# Patient Record
Sex: FEMALE | Race: BLACK OR AFRICAN AMERICAN | NOT HISPANIC OR LATINO
[De-identification: names, ages, dates, MRNs, and addresses within clinical notes are randomized per-mention and may not be internally consistent; named-entity substitution may affect disease eponyms.]

---

## 2017-04-24 ENCOUNTER — APPOINTMENT (OUTPATIENT)
Dept: OBGYN | Facility: CLINIC | Age: 27
End: 2017-04-24

## 2017-04-24 VITALS
DIASTOLIC BLOOD PRESSURE: 80 MMHG | HEIGHT: 67 IN | BODY MASS INDEX: 42.53 KG/M2 | SYSTOLIC BLOOD PRESSURE: 126 MMHG | HEART RATE: 59 BPM | WEIGHT: 271 LBS

## 2017-04-24 LAB
HCG UR QL: NEGATIVE
QUALITY CONTROL: YES

## 2017-04-26 ENCOUNTER — OTHER (OUTPATIENT)
Age: 27
End: 2017-04-26

## 2017-04-26 LAB
C TRACH RRNA SPEC QL NAA+PROBE: NORMAL
HCG SERPL-MCNC: <1 MIU/ML
N GONORRHOEA RRNA SPEC QL NAA+PROBE: NORMAL
SOURCE AMPLIFICATION: NORMAL
T4 FREE SERPL-MCNC: 1 NG/DL
TSH SERPL-ACNC: 2.03 UIU/ML

## 2017-06-12 ENCOUNTER — APPOINTMENT (OUTPATIENT)
Dept: OBGYN | Facility: CLINIC | Age: 27
End: 2017-06-12

## 2017-06-12 VITALS
HEIGHT: 67 IN | WEIGHT: 267.5 LBS | DIASTOLIC BLOOD PRESSURE: 75 MMHG | BODY MASS INDEX: 41.99 KG/M2 | HEART RATE: 73 BPM | SYSTOLIC BLOOD PRESSURE: 115 MMHG

## 2017-06-12 RX ORDER — MEDROXYPROGESTERONE ACETATE 10 MG/1
10 TABLET ORAL DAILY
Qty: 10 | Refills: 0 | Status: DISCONTINUED | COMMUNITY
Start: 2017-04-26 | End: 2017-06-12

## 2017-07-03 LAB — CYTOLOGY CVX/VAG DOC THIN PREP: NORMAL

## 2017-10-06 ENCOUNTER — APPOINTMENT (OUTPATIENT)
Dept: OBGYN | Facility: CLINIC | Age: 27
End: 2017-10-06
Payer: COMMERCIAL

## 2017-10-06 VITALS
BODY MASS INDEX: 40.97 KG/M2 | HEART RATE: 86 BPM | WEIGHT: 261 LBS | DIASTOLIC BLOOD PRESSURE: 84 MMHG | SYSTOLIC BLOOD PRESSURE: 125 MMHG | HEIGHT: 67 IN

## 2017-10-06 DIAGNOSIS — L29.2 PRURITUS VULVAE: ICD-10-CM

## 2017-10-06 PROCEDURE — 99213 OFFICE O/P EST LOW 20 MIN: CPT

## 2017-10-06 RX ORDER — FLUCONAZOLE 150 MG/1
150 TABLET ORAL
Qty: 2 | Refills: 0 | Status: DISCONTINUED | COMMUNITY
Start: 2017-08-03

## 2017-10-10 ENCOUNTER — OTHER (OUTPATIENT)
Age: 27
End: 2017-10-10

## 2017-10-10 LAB
CANDIDA VAG CYTO: DETECTED
G VAGINALIS+PREV SP MTYP VAG QL MICRO: NOT DETECTED
T VAGINALIS VAG QL WET PREP: NOT DETECTED

## 2018-04-13 ENCOUNTER — TRANSCRIPTION ENCOUNTER (OUTPATIENT)
Age: 28
End: 2018-04-13

## 2018-04-20 ENCOUNTER — TRANSCRIPTION ENCOUNTER (OUTPATIENT)
Age: 28
End: 2018-04-20

## 2018-06-18 ENCOUNTER — APPOINTMENT (OUTPATIENT)
Dept: OBGYN | Facility: CLINIC | Age: 28
End: 2018-06-18
Payer: MEDICARE

## 2018-06-18 VITALS
BODY MASS INDEX: 40.02 KG/M2 | HEIGHT: 67 IN | SYSTOLIC BLOOD PRESSURE: 113 MMHG | WEIGHT: 255 LBS | HEART RATE: 82 BPM | DIASTOLIC BLOOD PRESSURE: 88 MMHG

## 2018-06-18 VITALS — SYSTOLIC BLOOD PRESSURE: 136 MMHG | DIASTOLIC BLOOD PRESSURE: 86 MMHG

## 2018-06-18 DIAGNOSIS — Z01.419 ENCOUNTER FOR GYNECOLOGICAL EXAMINATION (GENERAL) (ROUTINE) W/OUT ABNORMAL FINDINGS: ICD-10-CM

## 2018-06-18 PROCEDURE — 99212 OFFICE O/P EST SF 10 MIN: CPT | Mod: 25

## 2018-06-18 PROCEDURE — 99395 PREV VISIT EST AGE 18-39: CPT

## 2018-07-04 LAB
C TRACH RRNA SPEC QL NAA+PROBE: NOT DETECTED
CYTOLOGY CVX/VAG DOC THIN PREP: NORMAL
N GONORRHOEA RRNA SPEC QL NAA+PROBE: NOT DETECTED
SOURCE AMPLIFICATION: NORMAL

## 2018-08-14 ENCOUNTER — APPOINTMENT (OUTPATIENT)
Dept: OBGYN | Facility: CLINIC | Age: 28
End: 2018-08-14
Payer: MEDICARE

## 2018-08-14 VITALS
DIASTOLIC BLOOD PRESSURE: 80 MMHG | HEIGHT: 67 IN | SYSTOLIC BLOOD PRESSURE: 126 MMHG | WEIGHT: 256 LBS | HEART RATE: 76 BPM | BODY MASS INDEX: 40.18 KG/M2

## 2018-08-14 DIAGNOSIS — Z30.09 ENCOUNTER FOR OTHER GENERAL COUNSELING AND ADVICE ON CONTRACEPTION: ICD-10-CM

## 2018-08-14 PROCEDURE — 99213 OFFICE O/P EST LOW 20 MIN: CPT

## 2018-12-03 ENCOUNTER — APPOINTMENT (OUTPATIENT)
Dept: OBGYN | Facility: CLINIC | Age: 28
End: 2018-12-03
Payer: MEDICARE

## 2018-12-03 VITALS
BODY MASS INDEX: 39.55 KG/M2 | WEIGHT: 252 LBS | DIASTOLIC BLOOD PRESSURE: 87 MMHG | HEART RATE: 75 BPM | SYSTOLIC BLOOD PRESSURE: 127 MMHG | HEIGHT: 67 IN

## 2018-12-03 VITALS — DIASTOLIC BLOOD PRESSURE: 87 MMHG | SYSTOLIC BLOOD PRESSURE: 127 MMHG

## 2018-12-03 DIAGNOSIS — Z30.41 ENCOUNTER FOR SURVEILLANCE OF CONTRACEPTIVE PILLS: ICD-10-CM

## 2018-12-03 PROCEDURE — 99213 OFFICE O/P EST LOW 20 MIN: CPT

## 2018-12-03 PROCEDURE — 99212 OFFICE O/P EST SF 10 MIN: CPT

## 2019-02-01 ENCOUNTER — OUTPATIENT (OUTPATIENT)
Dept: OUTPATIENT SERVICES | Facility: HOSPITAL | Age: 29
LOS: 1 days | End: 2019-02-01
Payer: MEDICAID

## 2019-02-01 PROCEDURE — G9001: CPT

## 2019-02-04 ENCOUNTER — APPOINTMENT (OUTPATIENT)
Dept: OBGYN | Facility: CLINIC | Age: 29
End: 2019-02-04

## 2019-02-05 ENCOUNTER — TRANSCRIPTION ENCOUNTER (OUTPATIENT)
Age: 29
End: 2019-02-05

## 2019-02-16 ENCOUNTER — EMERGENCY (EMERGENCY)
Facility: HOSPITAL | Age: 29
LOS: 1 days | Discharge: HOME | End: 2019-02-16
Attending: EMERGENCY MEDICINE | Admitting: EMERGENCY MEDICINE
Payer: MEDICAID

## 2019-02-16 VITALS
DIASTOLIC BLOOD PRESSURE: 79 MMHG | RESPIRATION RATE: 18 BRPM | HEART RATE: 78 BPM | OXYGEN SATURATION: 98 % | TEMPERATURE: 98 F | SYSTOLIC BLOOD PRESSURE: 146 MMHG

## 2019-02-16 VITALS
TEMPERATURE: 98 F | SYSTOLIC BLOOD PRESSURE: 135 MMHG | HEART RATE: 82 BPM | DIASTOLIC BLOOD PRESSURE: 86 MMHG | RESPIRATION RATE: 18 BRPM | OXYGEN SATURATION: 100 %

## 2019-02-16 LAB
ALBUMIN SERPL ELPH-MCNC: 3.4 G/DL — SIGNIFICANT CHANGE UP (ref 3.3–5)
ALP SERPL-CCNC: 55 U/L — SIGNIFICANT CHANGE UP (ref 40–120)
ALT FLD-CCNC: 10 U/L — SIGNIFICANT CHANGE UP (ref 4–33)
ANION GAP SERPL CALC-SCNC: 13 MMO/L — SIGNIFICANT CHANGE UP (ref 7–14)
AST SERPL-CCNC: 15 U/L — SIGNIFICANT CHANGE UP (ref 4–32)
BASOPHILS # BLD AUTO: 0.01 K/UL — SIGNIFICANT CHANGE UP (ref 0–0.2)
BASOPHILS NFR BLD AUTO: 0.1 % — SIGNIFICANT CHANGE UP (ref 0–2)
BILIRUB SERPL-MCNC: < 0.2 MG/DL — LOW (ref 0.2–1.2)
BUN SERPL-MCNC: 19 MG/DL — SIGNIFICANT CHANGE UP (ref 7–23)
CALCIUM SERPL-MCNC: 8.9 MG/DL — SIGNIFICANT CHANGE UP (ref 8.4–10.5)
CHLORIDE SERPL-SCNC: 104 MMOL/L — SIGNIFICANT CHANGE UP (ref 98–107)
CO2 SERPL-SCNC: 22 MMOL/L — SIGNIFICANT CHANGE UP (ref 22–31)
CREAT SERPL-MCNC: 1.46 MG/DL — HIGH (ref 0.5–1.3)
EOSINOPHIL # BLD AUTO: 0.03 K/UL — SIGNIFICANT CHANGE UP (ref 0–0.5)
EOSINOPHIL NFR BLD AUTO: 0.2 % — SIGNIFICANT CHANGE UP (ref 0–6)
GLUCOSE SERPL-MCNC: 106 MG/DL — HIGH (ref 70–99)
HCT VFR BLD CALC: 38.4 % — SIGNIFICANT CHANGE UP (ref 34.5–45)
HGB BLD-MCNC: 12.3 G/DL — SIGNIFICANT CHANGE UP (ref 11.5–15.5)
IMM GRANULOCYTES NFR BLD AUTO: 0.6 % — SIGNIFICANT CHANGE UP (ref 0–1.5)
LYMPHOCYTES # BLD AUTO: 1.22 K/UL — SIGNIFICANT CHANGE UP (ref 1–3.3)
LYMPHOCYTES # BLD AUTO: 8.4 % — LOW (ref 13–44)
MCHC RBC-ENTMCNC: 25.9 PG — LOW (ref 27–34)
MCHC RBC-ENTMCNC: 32 % — SIGNIFICANT CHANGE UP (ref 32–36)
MCV RBC AUTO: 80.8 FL — SIGNIFICANT CHANGE UP (ref 80–100)
MONOCYTES # BLD AUTO: 0.26 K/UL — SIGNIFICANT CHANGE UP (ref 0–0.9)
MONOCYTES NFR BLD AUTO: 1.8 % — LOW (ref 2–14)
NEUTROPHILS # BLD AUTO: 12.84 K/UL — HIGH (ref 1.8–7.4)
NEUTROPHILS NFR BLD AUTO: 88.9 % — HIGH (ref 43–77)
NRBC # FLD: 0 K/UL — LOW (ref 25–125)
PLATELET # BLD AUTO: 365 K/UL — SIGNIFICANT CHANGE UP (ref 150–400)
PMV BLD: 9 FL — SIGNIFICANT CHANGE UP (ref 7–13)
POTASSIUM SERPL-MCNC: 4.9 MMOL/L — SIGNIFICANT CHANGE UP (ref 3.5–5.3)
POTASSIUM SERPL-SCNC: 4.9 MMOL/L — SIGNIFICANT CHANGE UP (ref 3.5–5.3)
PROT SERPL-MCNC: 6.5 G/DL — SIGNIFICANT CHANGE UP (ref 6–8.3)
RBC # BLD: 4.75 M/UL — SIGNIFICANT CHANGE UP (ref 3.8–5.2)
RBC # FLD: 15 % — HIGH (ref 10.3–14.5)
SODIUM SERPL-SCNC: 139 MMOL/L — SIGNIFICANT CHANGE UP (ref 135–145)
WBC # BLD: 14.45 K/UL — HIGH (ref 3.8–10.5)
WBC # FLD AUTO: 14.45 K/UL — HIGH (ref 3.8–10.5)

## 2019-02-16 PROCEDURE — 99283 EMERGENCY DEPT VISIT LOW MDM: CPT | Mod: 25

## 2019-02-16 RX ORDER — DIPHENHYDRAMINE HCL 50 MG
50 CAPSULE ORAL ONCE
Qty: 0 | Refills: 0 | Status: COMPLETED | OUTPATIENT
Start: 2019-02-16 | End: 2019-02-16

## 2019-02-16 RX ORDER — FAMOTIDINE 10 MG/ML
20 INJECTION INTRAVENOUS ONCE
Qty: 0 | Refills: 0 | Status: COMPLETED | OUTPATIENT
Start: 2019-02-16 | End: 2019-02-16

## 2019-02-16 RX ADMIN — FAMOTIDINE 20 MILLIGRAM(S): 10 INJECTION INTRAVENOUS at 06:41

## 2019-02-16 RX ADMIN — Medication 125 MILLIGRAM(S): at 06:41

## 2019-02-16 RX ADMIN — Medication 50 MILLIGRAM(S): at 06:41

## 2019-02-16 NOTE — ED ADULT NURSE NOTE - OBJECTIVE STATEMENT
irwin RN: Patient A&Ox3 coming to ED for lip swelling, rashes over various parts of the body, itching starting last night. No airway issue, patient able to speak in full sentences, tongue does not appear to be swollen, respirations even and unlabored. Recently finished antibiotics for strep throat. IV placed, medicated as ordered. No acute distress. Report given to primary RN

## 2019-02-16 NOTE — ED ADULT TRIAGE NOTE - CHIEF COMPLAINT QUOTE
Pt p/w allergic reaction.  Lips swollen.  tongue not swollen.  speaking in full sentences.  no drooling.  has large hives on elbows and forearms.  skin itchy.  States she awoke that way.  no new products.  just finished amoxicillin dose

## 2019-02-16 NOTE — ED ADULT NURSE REASSESSMENT NOTE - NS ED NURSE REASSESS COMMENT FT1
Patient resting in no distress with her family at the bedside. Labs sent as ordered, will continue to monitor.

## 2019-02-16 NOTE — ED PROVIDER NOTE - PROGRESS NOTE DETAILS
Lip edema improved, Cr 1.46 according to patient is similar to her baseline. Will d/c home. Told pt to stop amoxicillin, also continue steroids, return for worsening edema.

## 2019-02-16 NOTE — ED PROVIDER NOTE - CLINICAL SUMMARY MEDICAL DECISION MAKING FREE TEXT BOX
29 y/o F with lip swelling and diffuse pruritis - likely allergic in origin (amoxicillin?) vs angioedema (pt is on losartan, which has potential very rare side effect of angioedema).  Airway intact, no e/o obstruction on exam.  Will rx symptomatically with antihistamines and steroids, reassess.

## 2019-02-16 NOTE — ED PROVIDER NOTE - OBJECTIVE STATEMENT
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  Pt reports waking up early this morning with swelling to lips and itching all over.  No fever, rash, tongue swelling, sore throat, voice changes, difficulty swallowing, SOB, CP, abd pain, n/v.  No known allergens.  Pt reports amoxicillin is only new medication, but completed 10 days of meds prior to sxs.  Also took melatonin prior to bed last night, but reports having taken this medication in the past without issue.

## 2019-02-16 NOTE — ED ADULT NURSE NOTE - CCCP TRG CHIEF CMPLNT
Randolph Lopez   MRN: [id number]    Department:  [facility] Emergency Department   Date of Visit:  11/13/2019           Disclosure     Insurance plans vary and the physician(s) referred by the ER may not be covered by your plan.  Please contact yo CARE PHYSICIAN AT ONCE OR RETURN IMMEDIATELY TO THE EMERGENCY DEPARTMENT. If you have been prescribed any medication(s), please fill your prescription right away and begin taking the medication(s) as directed.   If you believe that any of the medications allergic reaction

## 2019-02-16 NOTE — ED PROVIDER NOTE - PHYSICAL EXAMINATION
(+)swelling to lips; uvula midline, no stridor, drooling or voice hoarseness, no tongue swelling or elevation

## 2019-02-16 NOTE — ED PROVIDER NOTE - NSFOLLOWUPINSTRUCTIONS_ED_ALL_ED_FT
1. Return to ED for worsening, progressive or any other concerning symptoms   2. Follow up with your primary care doctor in 2-3days  3. Take prednisone 40mg for next 4 days.  4. Take benadryl 25mg every 6 hours as needed for itching  5. Follow up with allergy physician.

## 2019-02-16 NOTE — ED PROVIDER NOTE - NSFOLLOWUPCLINICS_GEN_ALL_ED_FT
[facility]  Allergy  [address] [place] [address]  Phone: [phone]  Fax:   Follow Up Time:

## 2019-02-20 DIAGNOSIS — Z71.89 OTHER SPECIFIED COUNSELING: ICD-10-CM

## 2019-06-24 ENCOUNTER — APPOINTMENT (OUTPATIENT)
Dept: OBGYN | Facility: CLINIC | Age: 29
End: 2019-06-24

## 2019-06-24 PROBLEM — N05.1 UNSPECIFIED NEPHRITIC SYNDROME WITH FOCAL AND SEGMENTAL GLOMERULAR LESIONS: Chronic | Status: ACTIVE | Noted: 2019-02-16

## 2019-12-20 ENCOUNTER — APPOINTMENT (OUTPATIENT)
Dept: OBGYN | Facility: CLINIC | Age: 29
End: 2019-12-20

## 2020-09-15 ENCOUNTER — EMERGENCY (EMERGENCY)
Facility: HOSPITAL | Age: 30
LOS: 1 days | Discharge: HOME | End: 2020-09-15
Attending: EMERGENCY MEDICINE | Admitting: EMERGENCY MEDICINE
Payer: COMMERCIAL

## 2020-09-15 VITALS
OXYGEN SATURATION: 100 % | TEMPERATURE: 98 F | SYSTOLIC BLOOD PRESSURE: 167 MMHG | HEIGHT: 67 IN | HEART RATE: 89 BPM | DIASTOLIC BLOOD PRESSURE: 109 MMHG | RESPIRATION RATE: 16 BRPM

## 2020-09-15 PROCEDURE — 73630 X-RAY EXAM OF FOOT: CPT | Mod: 26,LT

## 2020-09-15 PROCEDURE — 99283 EMERGENCY DEPT VISIT LOW MDM: CPT

## 2020-09-15 NOTE — ED PROVIDER NOTE - OBJECTIVE STATEMENT
HPI- Patient is a 30 y.o female with PMHx of FSGS glomerulonephritis who presents to ED c/o Lt forefoot and toe pain s/p dropping a pot on foot today. Pt states pain mostly of 2nd-4th digits and is exacerbated with ambulating. Pt notes some swelling of toes/foot. Denies numbness or tingling, weakness, discoloration, leg pain, ankle pain or any other extremity injuries.

## 2020-09-15 NOTE — ED PROVIDER NOTE - PATIENT PORTAL LINK FT
You can access the FollowMyHealth Patient Portal offered by [facility] by registering at the following website: [email or web address]. By joining [facility]’s FollowMyHealth portal, you will also be able to view your health information using other applications (apps) compatible with our system.

## 2020-09-15 NOTE — ED PROVIDER NOTE - PHYSICAL EXAMINATION
Vital signs reviewed.   CONSTITUTIONAL: Well-appearing; well-nourished; in no apparent distress. Non-toxic appearing.   HEAD: Normocephalic, atraumatic.  EYES: PERRL, EOM intact, conjunctiva and sclera WNL.  ENT: normal nose; no rhinorrhea;   NECK/LYMPH: Supple; non-tender;   RESP: NAD  EXT/MS: moves all extremities; distal pulses are normal, no pedal edema. +ttp noted over 3rd toe Lt side, mild forefoot tenderness. No crepitus. Compartments soft. No subungual hematoma noted.   SKIN: Normal for age and race; warm; dry; good turgor; no apparent lesions or exudate noted.  NEURO: Awake, alert, oriented x 3, no gross deficits, CN II-XII grossly intact, no motor or sensory deficit noted.  PSYCH: Normal mood; appropriate affect.

## 2020-09-15 NOTE — ED PROVIDER NOTE - CLINICAL SUMMARY MEDICAL DECISION MAKING FREE TEXT BOX
Patient is a 30 y.o female with PMHx of FSGS glomerulonephritis who presents to ED c/o Lt forefoot and toe pain s/p dropping a pot on foot today.  DDx- contusion r/o fx. Plan- Foot xray. Pt declined analgesic. Denies being pregnant.

## 2020-09-15 NOTE — ED PROVIDER NOTE - PROGRESS NOTE DETAILS
JEFF Rosasill- Results of xray showing soft tissue swelling but no fx. Will tera tape toes and apply ace wrap. Pt advised to rest, ice, keep elevated, avoid strenuous activity. Work note provided. Podiatry f/u given. Pt understood and agreed with plan. All questions and concerns addressed. Strict return instructions given.

## 2020-09-15 NOTE — ED PROVIDER NOTE - ATTENDING CONTRIBUTION TO CARE
MD Schultz:  patient seen and evaluated with the PA.  I was present for key portions of the History and Physical, and I agree with the Impression and Plan.    MD Schultz:  29 yo F, c/o L 2-4 toe pain.  Onset: 5pm.  Context:  dropped heavy object on toes.  Associated Sx: no lacerations.  Gen: Well appearing in NAD  Head: NC/AT  Ext:  L foot/toes grossly normal.  No laceration.  No nail deformities.  +TTP toes 2-4  Impression:  L foot/toe contusions  Plan:  xray, apap, reassess.

## 2020-09-15 NOTE — ED PROVIDER NOTE - NSFOLLOWUPINSTRUCTIONS_ED_ALL_ED_FT
Patient advised to follow up with PRIMARY CARE DOCTOR IN 1-2 DAYS AND PODIATRY WITHIN WEEK  and told to return to the emergency department immediately for any new or concerning symptoms OR ANY OTHER COMPLAINTS. Patient agrees with plan.    Keep extremity elevated and wrapped.  Apply cool compresses to affected area for 15 minutes, 3-4 times per day.    Take motrin or tylenol as needed for pain   Advance activity as tolerated.  Continue all previously prescribed medications as directed unless otherwise instructed.  Follow up with your primary care physician in 48-72 hours- bring copies of your results.  Return to the ER for worsening or persistent symptoms, and/or ANY NEW OR CONCERNING SYMPTOMS. If you have issues obtaining follow up, please call: [phone] to obtain a doctor or specialist who takes your insurance in your area.  You may call [phone] to make an appointment with the internal medicine clinic.

## 2021-03-27 ENCOUNTER — INPATIENT (INPATIENT)
Facility: HOSPITAL | Age: 31
LOS: 3 days | Discharge: HOME HEALTH | End: 2021-03-31
Attending: STUDENT IN AN ORGANIZED HEALTH CARE EDUCATION/TRAINING PROGRAM | Admitting: STUDENT IN AN ORGANIZED HEALTH CARE EDUCATION/TRAINING PROGRAM
Payer: COMMERCIAL

## 2021-03-27 VITALS
HEIGHT: 67 IN | RESPIRATION RATE: 20 BRPM | DIASTOLIC BLOOD PRESSURE: 79 MMHG | SYSTOLIC BLOOD PRESSURE: 115 MMHG | TEMPERATURE: 97 F | OXYGEN SATURATION: 96 % | HEART RATE: 117 BPM

## 2021-03-27 LAB
ALBUMIN SERPL ELPH-MCNC: 2.7 G/DL — LOW (ref 3.3–5)
ALP SERPL-CCNC: 69 U/L — SIGNIFICANT CHANGE UP (ref 40–120)
ALT FLD-CCNC: 21 U/L — SIGNIFICANT CHANGE UP (ref 4–33)
ANION GAP SERPL CALC-SCNC: 14 MMOL/L — SIGNIFICANT CHANGE UP (ref 7–14)
APTT BLD: 32.1 SEC — SIGNIFICANT CHANGE UP (ref 27–36.3)
AST SERPL-CCNC: 23 U/L — SIGNIFICANT CHANGE UP (ref 4–32)
BASOPHILS # BLD AUTO: 0.02 K/UL — SIGNIFICANT CHANGE UP (ref 0–0.2)
BASOPHILS NFR BLD AUTO: 0.3 % — SIGNIFICANT CHANGE UP (ref 0–2)
BILIRUB SERPL-MCNC: <0.2 MG/DL — SIGNIFICANT CHANGE UP (ref 0.2–1.2)
BUN SERPL-MCNC: 22 MG/DL — SIGNIFICANT CHANGE UP (ref 7–23)
CALCIUM SERPL-MCNC: 8.6 MG/DL — SIGNIFICANT CHANGE UP (ref 8.4–10.5)
CHLORIDE SERPL-SCNC: 105 MMOL/L — SIGNIFICANT CHANGE UP (ref 98–107)
CO2 SERPL-SCNC: 17 MMOL/L — LOW (ref 22–31)
CREAT SERPL-MCNC: 2.65 MG/DL — HIGH (ref 0.5–1.3)
EOSINOPHIL # BLD AUTO: 0.01 K/UL — SIGNIFICANT CHANGE UP (ref 0–0.5)
EOSINOPHIL NFR BLD AUTO: 0.2 % — SIGNIFICANT CHANGE UP (ref 0–6)
GLUCOSE SERPL-MCNC: 97 MG/DL — SIGNIFICANT CHANGE UP (ref 70–99)
HCG SERPL-ACNC: <5 MIU/ML — SIGNIFICANT CHANGE UP
HCT VFR BLD CALC: 43.3 % — SIGNIFICANT CHANGE UP (ref 34.5–45)
HGB BLD-MCNC: 13.4 G/DL — SIGNIFICANT CHANGE UP (ref 11.5–15.5)
IANC: 3.92 K/UL — SIGNIFICANT CHANGE UP (ref 1.5–8.5)
IMM GRANULOCYTES NFR BLD AUTO: 1.1 % — SIGNIFICANT CHANGE UP (ref 0–1.5)
INR BLD: 1.11 RATIO — SIGNIFICANT CHANGE UP (ref 0.88–1.16)
LYMPHOCYTES # BLD AUTO: 1.64 K/UL — SIGNIFICANT CHANGE UP (ref 1–3.3)
LYMPHOCYTES # BLD AUTO: 26.2 % — SIGNIFICANT CHANGE UP (ref 13–44)
MCHC RBC-ENTMCNC: 24.1 PG — LOW (ref 27–34)
MCHC RBC-ENTMCNC: 30.9 GM/DL — LOW (ref 32–36)
MCV RBC AUTO: 78 FL — LOW (ref 80–100)
MONOCYTES # BLD AUTO: 0.59 K/UL — SIGNIFICANT CHANGE UP (ref 0–0.9)
MONOCYTES NFR BLD AUTO: 9.4 % — SIGNIFICANT CHANGE UP (ref 2–14)
NEUTROPHILS # BLD AUTO: 3.92 K/UL — SIGNIFICANT CHANGE UP (ref 1.8–7.4)
NEUTROPHILS NFR BLD AUTO: 62.8 % — SIGNIFICANT CHANGE UP (ref 43–77)
NRBC # BLD: 0 /100 WBCS — SIGNIFICANT CHANGE UP
NRBC # FLD: 0 K/UL — SIGNIFICANT CHANGE UP
PLATELET # BLD AUTO: 303 K/UL — SIGNIFICANT CHANGE UP (ref 150–400)
POTASSIUM SERPL-MCNC: 4.2 MMOL/L — SIGNIFICANT CHANGE UP (ref 3.5–5.3)
POTASSIUM SERPL-SCNC: 4.2 MMOL/L — SIGNIFICANT CHANGE UP (ref 3.5–5.3)
PROT SERPL-MCNC: 6.6 G/DL — SIGNIFICANT CHANGE UP (ref 6–8.3)
PROTHROM AB SERPL-ACNC: 12.7 SEC — SIGNIFICANT CHANGE UP (ref 10.6–13.6)
RBC # BLD: 5.55 M/UL — HIGH (ref 3.8–5.2)
RBC # FLD: 15.1 % — HIGH (ref 10.3–14.5)
SODIUM SERPL-SCNC: 136 MMOL/L — SIGNIFICANT CHANGE UP (ref 135–145)
TROPONIN T, HIGH SENSITIVITY RESULT: 13 NG/L — SIGNIFICANT CHANGE UP
WBC # BLD: 6.25 K/UL — SIGNIFICANT CHANGE UP (ref 3.8–10.5)
WBC # FLD AUTO: 6.25 K/UL — SIGNIFICANT CHANGE UP (ref 3.8–10.5)

## 2021-03-27 PROCEDURE — 99285 EMERGENCY DEPT VISIT HI MDM: CPT

## 2021-03-27 PROCEDURE — 71045 X-RAY EXAM CHEST 1 VIEW: CPT | Mod: 26

## 2021-03-27 RX ORDER — ASPIRIN/CALCIUM CARB/MAGNESIUM 324 MG
162 TABLET ORAL ONCE
Refills: 0 | Status: COMPLETED | OUTPATIENT
Start: 2021-03-27 | End: 2021-03-27

## 2021-03-27 RX ORDER — SODIUM CHLORIDE 9 MG/ML
1000 INJECTION INTRAMUSCULAR; INTRAVENOUS; SUBCUTANEOUS ONCE
Refills: 0 | Status: COMPLETED | OUTPATIENT
Start: 2021-03-27 | End: 2021-03-27

## 2021-03-27 RX ADMIN — SODIUM CHLORIDE 1000 MILLILITER(S): 9 INJECTION INTRAMUSCULAR; INTRAVENOUS; SUBCUTANEOUS at 22:44

## 2021-03-27 RX ADMIN — Medication 162 MILLIGRAM(S): at 22:44

## 2021-03-27 NOTE — ED ADULT TRIAGE NOTE - CHIEF COMPLAINT QUOTE
Pt c/o sob and throat pain worse on inspiration. Reports tested covid + 3/21. PMHX FSGS. No distress noted.

## 2021-03-27 NOTE — ED ADULT NURSE NOTE - OBJECTIVE STATEMENT
Pt presents to  2 AO4 ambulatory complaining of chest pain. Pt tested positive for Covid on Carter 3/21, since then has been experiencing worsening SOB/BURKETT, along with chest pain located centrally, denies any radiation. Denies any cardiac Hx. Endorses Nausea/vomiting beginning earlier in week, intermittently, also endorses diarrhea beginning today. Pt endorses being on period beginning 3/24. Denies any other complaints, able to speak in full sentences, resp even and unlabored and well appearing. 20g IV placed to L hand, labs drawn and sent. Medicated as per EMR.

## 2021-03-28 DIAGNOSIS — N17.9 ACUTE KIDNEY FAILURE, UNSPECIFIED: ICD-10-CM

## 2021-03-28 DIAGNOSIS — R07.9 CHEST PAIN, UNSPECIFIED: ICD-10-CM

## 2021-03-28 DIAGNOSIS — N05.1 UNSPECIFIED NEPHRITIC SYNDROME WITH FOCAL AND SEGMENTAL GLOMERULAR LESIONS: ICD-10-CM

## 2021-03-28 DIAGNOSIS — U07.1 COVID-19: ICD-10-CM

## 2021-03-28 DIAGNOSIS — Z29.9 ENCOUNTER FOR PROPHYLACTIC MEASURES, UNSPECIFIED: ICD-10-CM

## 2021-03-28 DIAGNOSIS — R06.02 SHORTNESS OF BREATH: ICD-10-CM

## 2021-03-28 LAB
ANION GAP SERPL CALC-SCNC: 12 MMOL/L — SIGNIFICANT CHANGE UP (ref 7–14)
APPEARANCE UR: CLEAR — SIGNIFICANT CHANGE UP
BILIRUB UR-MCNC: NEGATIVE — SIGNIFICANT CHANGE UP
BUN SERPL-MCNC: 22 MG/DL — SIGNIFICANT CHANGE UP (ref 7–23)
CALCIUM SERPL-MCNC: 8.1 MG/DL — LOW (ref 8.4–10.5)
CHLORIDE SERPL-SCNC: 109 MMOL/L — HIGH (ref 98–107)
CO2 SERPL-SCNC: 16 MMOL/L — LOW (ref 22–31)
COLOR SPEC: YELLOW — SIGNIFICANT CHANGE UP
CREAT ?TM UR-MCNC: 99 MG/DL — SIGNIFICANT CHANGE UP
CREAT SERPL-MCNC: 2.37 MG/DL — HIGH (ref 0.5–1.3)
CRP SERPL-MCNC: 13 MG/L — HIGH
D DIMER BLD IA.RAPID-MCNC: 236 NG/ML DDU — HIGH
DIFF PNL FLD: ABNORMAL
FERRITIN SERPL-MCNC: 159 NG/ML — HIGH (ref 15–150)
GLUCOSE SERPL-MCNC: 83 MG/DL — SIGNIFICANT CHANGE UP (ref 70–99)
GLUCOSE UR QL: NEGATIVE — SIGNIFICANT CHANGE UP
KETONES UR-MCNC: ABNORMAL
LDH SERPL L TO P-CCNC: 412 U/L — HIGH (ref 135–225)
LEUKOCYTE ESTERASE UR-ACNC: NEGATIVE — SIGNIFICANT CHANGE UP
MAGNESIUM SERPL-MCNC: 2.1 MG/DL — SIGNIFICANT CHANGE UP (ref 1.6–2.6)
NITRITE UR-MCNC: NEGATIVE — SIGNIFICANT CHANGE UP
PH UR: 6.5 — SIGNIFICANT CHANGE UP (ref 5–8)
PHOSPHATE SERPL-MCNC: 3.7 MG/DL — SIGNIFICANT CHANGE UP (ref 2.5–4.5)
POTASSIUM SERPL-MCNC: 4.6 MMOL/L — SIGNIFICANT CHANGE UP (ref 3.5–5.3)
POTASSIUM SERPL-SCNC: 4.6 MMOL/L — SIGNIFICANT CHANGE UP (ref 3.5–5.3)
PROCALCITONIN SERPL-MCNC: 0.1 NG/ML — SIGNIFICANT CHANGE UP (ref 0.02–0.1)
PROT ?TM UR-MCNC: 1269 MG/DL — SIGNIFICANT CHANGE UP
PROT UR-MCNC: ABNORMAL
PROT/CREAT UR-RTO: 12.8 RATIO — HIGH (ref 0–0.2)
SARS-COV-2 RNA SPEC QL NAA+PROBE: DETECTED
SODIUM SERPL-SCNC: 137 MMOL/L — SIGNIFICANT CHANGE UP (ref 135–145)
SODIUM UR-SCNC: 27 MMOL/L — SIGNIFICANT CHANGE UP
SP GR SPEC: 1.02 — SIGNIFICANT CHANGE UP (ref 1.01–1.02)
TROPONIN T, HIGH SENSITIVITY RESULT: 10 NG/L — SIGNIFICANT CHANGE UP
UROBILINOGEN FLD QL: SIGNIFICANT CHANGE UP

## 2021-03-28 PROCEDURE — 78580 LUNG PERFUSION IMAGING: CPT | Mod: 26

## 2021-03-28 PROCEDURE — 99223 1ST HOSP IP/OBS HIGH 75: CPT | Mod: GC

## 2021-03-28 RX ORDER — INFLUENZA VIRUS VACCINE 15; 15; 15; 15 UG/.5ML; UG/.5ML; UG/.5ML; UG/.5ML
0.5 SUSPENSION INTRAMUSCULAR ONCE
Refills: 0 | Status: ACTIVE | OUTPATIENT
Start: 2021-03-28 | End: 2022-02-24

## 2021-03-28 RX ORDER — ALBUTEROL 90 UG/1
2.5 AEROSOL, METERED ORAL EVERY 6 HOURS
Refills: 0 | Status: DISCONTINUED | OUTPATIENT
Start: 2021-03-28 | End: 2021-03-28

## 2021-03-28 RX ORDER — ACETAMINOPHEN 500 MG
650 TABLET ORAL EVERY 6 HOURS
Refills: 0 | Status: ACTIVE | OUTPATIENT
Start: 2021-03-28 | End: 2022-02-24

## 2021-03-28 RX ORDER — SODIUM CHLORIDE 9 MG/ML
1000 INJECTION, SOLUTION INTRAVENOUS
Refills: 0 | Status: DISCONTINUED | OUTPATIENT
Start: 2021-03-28 | End: 2021-03-29

## 2021-03-28 RX ORDER — ALBUTEROL 90 UG/1
1 AEROSOL, METERED ORAL EVERY 4 HOURS
Refills: 0 | Status: ACTIVE | OUTPATIENT
Start: 2021-03-28 | End: 2022-02-24

## 2021-03-28 RX ORDER — HEPARIN SODIUM 5000 [USP'U]/ML
5000 INJECTION INTRAVENOUS; SUBCUTANEOUS EVERY 12 HOURS
Refills: 0 | Status: ACTIVE | OUTPATIENT
Start: 2021-03-28 | End: 2022-02-24

## 2021-03-28 RX ADMIN — ALBUTEROL 1 PUFF(S): 90 AEROSOL, METERED ORAL at 18:12

## 2021-03-28 RX ADMIN — SODIUM CHLORIDE 75 MILLILITER(S): 9 INJECTION, SOLUTION INTRAVENOUS at 18:11

## 2021-03-28 RX ADMIN — HEPARIN SODIUM 5000 UNIT(S): 5000 INJECTION INTRAVENOUS; SUBCUTANEOUS at 18:18

## 2021-03-28 NOTE — ED PROVIDER NOTE - PHYSICAL EXAMINATION
Gen: AAOx3, non-toxic  Head: NCAT  HEENT: EOMI, oral mucosa moist, normal conjunctiva, airway patent, no erythema or exudates   Lung: CTAB, no respiratory distress, speaking in full sentences  CV: RRR, no murmurs, rubs or gallops  Abd: soft, NTND, no guarding  MSK: no visible deformities  Neuro: No focal sensory or motor deficits  Skin: Warm, well perfused, no rash  Psych: normal affect.   ~Aníbal Christina M.D. Resident

## 2021-03-28 NOTE — ED PROVIDER NOTE - OBJECTIVE STATEMENT
30yF h/o FSGS, Covid+ presents with chest tightness and throat discomfort of one day duration. Reports non radiating, non exertional pleuritic chest tightness worsened with inspiration along with throat discomfort. No fever, sob, abd pain, n/v, urinary or bowel irregularities.    Nephrologist: Jeff Colvin

## 2021-03-28 NOTE — ED PROVIDER NOTE - IV ALTEPLASE EXCL ABS HIDDEN
Pt seen and examined at bedside with resident. Pt states he feels ok. Pt has hypoxic respiratory failure due to COVID-19 pneumonia. He still requires supplemental oxygen. Pt remains on steroids. Pt is  great candidate to transfers to [facility]. Pt states he will think about it and discuss with his wife.    #Progress Note Handoff:  Pending (specify):  transfer east OR clinical improvement with d/c home  Family discussion: discussed need for continued monitoring for respiratory failure, transfer east  Disposition: Home___/SNF___/Other________/Unknown at this time____x____ show

## 2021-03-28 NOTE — H&P ADULT - ASSESSMENT
30 year old female with hx of FSGS is here for chest pain and SOB. She is being admitted for PE evaluation and COVID symptom management.

## 2021-03-28 NOTE — H&P ADULT - ATTENDING COMMENTS
30 year old female with hx of FSGS p/w sob, palpitations, nausea admitted for mild COVID 19 PNA c/b gastroenteritis, volume depletion, RISHABH on CKD and r/o PE. D-dimer mildly elevated, EKG showed sinus tachy, obtain V/Q scan and b/l LE duplex. Currently SaO2 99% on RA therefore not a candidate for remdesivir or dexamethasone. c/w symptomatic tx. Per pt baseline Scr near 1 although unsure, prior EMR with peak Scr 2.50. s/p 1L IVF, repeat BMP, will consider maintenance IVF. Consult nephrology. UA with large blood d/t menstruation. Rest of management as discussed above.

## 2021-03-28 NOTE — ED PROVIDER NOTE - CLINICAL SUMMARY MEDICAL DECISION MAKING FREE TEXT BOX
30yF h/o FSGS, Covid+ presents with chest tightness and throat discomfort of one day duration. Concern for but not limited to ACS vs Covid vs PE. Will give aspirin, get ekg, labs, trop, UA, HCG, CXR and reassess

## 2021-03-28 NOTE — H&P ADULT - PROBLEM SELECTOR PLAN 4
- likely due to poor PO intake, diarrhea and vomiting.   - Feb 2019 crea 1.46   - s/p 1L NS in ER  - recheck BMP   - She follows up with Dr Cecilio Colvin as outpatient - will consult him - likely due to poor PO intake, diarrhea and vomiting.   - Feb 2019 crea 1.46   - s/p 1L NS in ER  - recheck BMP   - She follows up with Dr Cecilio Colvin as outpatient - will consult him  - she takes losartan 25 daily at home (last filled Decemeber 2020) - will hold for now given RISHABH

## 2021-03-28 NOTE — H&P ADULT - NSHPSOCIALHISTORY_GEN_ALL_CORE
ETOH - occasionally drinks. Denies heavy alcohol use or abuse  Smoke - denies cigarette use  Drugs - smokes marijuana - rarely

## 2021-03-28 NOTE — H&P ADULT - HISTORY OF PRESENT ILLNESS
30 year old female with hx of FSGS is here for chest pain and SOB. Patient's nephew tested positive for COVID and this prompted her and the rest of her family to get tested. Patient tested positive as outpatient on 03/21/2021. She started having 6/10 chest pain and SOB 1 to 2 days ago. Chest pain was midsternal, described as tightness, worse with ambulation/movement and taking deep breaths. Patient feels that her throat is closing when she takes deep breaths. No alleviating factors. She also has SOB when ambulating and feels lightheaded. She has been nauseous for the last week and had 2 episodes of yellow vomiting (1 episode yesterday at home and one episode in ER). She had 1 episode of green bowel movement but also noted that she has her menstrual period at this time that could be causing her to have BMs. She has poor PO intake since her COVID diagnosis. Denies fever, chills, abdominal pain.     In ER, patient is COVID+. 98% on room air. Trop 13-10 with no ST changes on EKG. CXR prelim read is negative for acute findings. s/p  mg. Creatine elevated to 2.65. V/Q scan pending to eval for PE. Patient is being admitted for chest pain, SOB and will rule out PE.

## 2021-03-28 NOTE — H&P ADULT - RS GEN PE MLT RESP DETAILS PC
normal/breath sounds equal/good air movement/respirations non-labored/clear to auscultation bilaterally/no intercostal retractions

## 2021-03-28 NOTE — H&P ADULT - PROBLEM SELECTOR PLAN 1
- midsternal chest pain   - trop 13-10  - EKG  bpm, Sinu tach  - telemetry monitoring  - given chest pain and SOB, will R/O PE   - unable to do CTA chest due to RISHABH  - check V/Q - midsternal chest pain   - trop 13-10  - EKG  bpm, Sinus tach  - telemetry monitoring  - given chest pain and SOB, will R/O PE   - unable to do CTA chest due to RISHABH  - check V/Q  - check US LE to eval for DVT

## 2021-03-28 NOTE — H&P ADULT - NSICDXFAMILYHX_GEN_ALL_CORE_FT
FAMILY HISTORY:  Father  Still living? Unknown  Family history of hypertension, Age at diagnosis: Age Unknown    Mother  Still living? Unknown  Family history of diabetes mellitus, Age at diagnosis: Age Unknown  Family history of hypertension, Age at diagnosis: Age Unknown

## 2021-03-28 NOTE — H&P ADULT - PROBLEM SELECTOR PLAN 2
- COVID+  - 98% on room air  - does not qualify for remdesivir.  - monitor for now - COVID+  - 98% on room air  - does not qualify for remdesivir.  - incentive spirometry   - monitor respiratory status - COVID+  - 98% on room air  - does not qualify for remdesivir  - check ferritin, LDH, procalcitonin, CRP  - incentive spirometry   - monitor respiratory status

## 2021-03-28 NOTE — ED PROVIDER NOTE - ATTENDING CONTRIBUTION TO CARE
30F chest tightness x 1 day, worse with deep breath, pleuritic pain.  Some central throat pain with deep insp.  No vesicles in throat.  Pt reports h/o FSGS, takes losartan.  Noted to be tachycardic here.  Will check labs, ddimer, trops, rx fluids, tylenol, ASA.  Cr elevated from previous visit.  If dimer elevated, likely will need admission for consideration of VQ scan (would not challenge kidneys with IV contrast in setting of elevate Cr); to be discussed with nephr Dr MILTON Colvin.  No personal or fam hx of VTE; not on OCP.  Pt reports is covid positive, has been having sx x 7 days at this point.  Ambulatory sat 98% but visibly winded walking around the room.  VS:  unremarkable except tachycardia     GEN - NAD;  malaise;   A+O x3   HEAD - NC/AT     ENT - PEERL, EOMI, mucous membranes   dry, no discharge      NECK: Neck supple, non-tender without lymphadenopathy, no masses, no JVD  PULM - CTA b/l,  symmetric breath sounds  COR -  fast heart sounds    ABD - , ND, NT, soft,  BACK - no CVA tenderness, nontender spine     EXTREMS - no edema, no deformity, warm and well perfused    SKIN - no rash    or bruising      NEUROLOGIC - alert, face symmetric, speech fluent, sensation nl, motor no focal deficit.

## 2021-03-28 NOTE — ED PROVIDER NOTE - NS ED ROS FT
GENERAL: No fever or chills, EYES: no change in vision, HEENT: +throat pain, no trouble swallowing or speaking, CARDIAC: +chest pain, PULMONARY: no cough or SOB, GI: no abdominal pain, no nausea, no vomiting, no diarrhea or constipation, : No changes in urination, SKIN: no rashes, NEURO: no headache,  MSK: No joint pain ~Aníbal Christina M.D. Resident

## 2021-03-28 NOTE — CONSULT NOTE ADULT - SUBJECTIVE AND OBJECTIVE BOX
HPI:  30 year old female with CKD and HTN p/w chest pain and SOB following recent (3/21) Dx of COVID-19 after exposure via her nephew. 2021. She started having 6/10 chest pain and SOB 1 to 2 days ago. Chest pain was midsternal, described as tightness, worse with ambulation/movement and taking deep breaths. Patient feels that her throat is closing when she takes deep breaths. No alleviating factors. She also has SOB when ambulating and feels lightheaded. She has been nauseous for the last week and had 2 episodes of yellow vomiting (1 episode yesterday at home and one episode in ER). She had 1 episode of green bowel movement but also noted that she has her menstrual period at this time that could be causing her to have BMs. She has poor PO intake since her COVID diagnosis. Denies fever, chills, abdominal pain. In ER, patient is COVID+. 98% on room air. She is now being evaluated for PE, and she is noted to have an elevated serum creatinine. Accordingly, a renal evaluation was requested. She has FSGS and is well-known to my partner Dr. Colvin who last her in our office no 10/28/20. At that time she was found to be hypertensive, in part due to poor med compliance. Her next appt in our office is scheduled for .    PAST MEDICAL & SURGICAL HISTORY:  CKD stage 3  HTN  FSGS (focal segmental glomerulosclerosis)  No significant past surgical history    MEDICATIONS  (STANDING):  heparin   Injectable 5000 Unit(s) SubCutaneous every 12 hours  influenza   Vaccine 0.5 milliLiter(s) IntraMuscular once    Allergies  amoxicillin (Angioedema; Urticaria)  Intolerances    SOCIAL HISTORY:  Denies EtOH, Smoking.    FAMILY HISTORY:  Family history of diabetes mellitus (Mother)  Family history of hypertension (Father, Mother)    REVIEW OF SYSTEMS:  Denies any nausea, vomiting, diarrhea, fevers or chills. Denies focal weakness, hematuria or dysuria.  Good oral intake and denies fatigue or weakness.  All other pertinent systems are reviewed and are negative.    VITALS:  T(F): 99.1 (21 @ 11:47), Max: 99.1 (21 @ 11:47)  HR: 98 (21 @ 11:47) (95 - 117)  BP: 125/75 (21 @ 11:47) (115/79 - 132/92)  SpO2: 98% (21 @ 11:47) (96% - 99%)  Weight (kg): 118 ( @ 11:47)    PHYSICAL EXAM:  General:  alert, cooperative and no distress  HEENT: no trauma  Lungs: clear to auscultation bilaterally  Heart: normal S1/S2  Abdomen: soft, non-tender not distended, + BS  Extremities: no clubbing, cyanosis or edema  Urologic: no gasca  Neurologic: Grossly normal  Skin: no rashes    LABS:                        13.4   6.25  )-----------( 303      ( 27 Mar 2021 23:03 )             43.3         137  |  109<H>  |  22  ----------------------------<  83  4.6   |  16<L>  |  2.37<H>    Ca    8.1<L>      28 Mar 2021 08:04  Phos  3.7       Mg     2.1         TPro  6.6  /  Alb  2.7<L>  /  TBili  <0.2  /  DBili  x   /  AST  23  /  ALT  21  /  AlkPhos  69        Urine Studies:  Urinalysis Basic - ( 28 Mar 2021 07:07 )  Color: Yellow / Appearance: Clear / S.023 / pH: x  Blood: x / Protein: >600 mg/dL / Nitrite: Negative   Leuk Esterase: Negative / RBC: >50 /HPF / WBC 8 /HPF     BASELINE LABS  10/29/20:  22/1.85, tp 4.7    ASSESSMENT:  Patient is a 30y Female with CKD and HTN recently Dx'd with COVID-19 p/w CP and SOB c/b acute on chronic kidney injury.  - CKD: stage 3 due to collapsing FSGS with baseline creatinine (as of Oct) 1.8  - RISHABH: nonoliguric. Possibly prerenal RISHABH vs Bactrim-induced vs progression of CKD  - metabolic acidosis (non AG): due to CKD/RISHABH  - HTN: BP controlled off meds now (was on losartan in Oct)    RECOMMENDATIONS:  - check urine Na+/creatinine/protein  - check renal US  - give IVF: 1/2 NS + 75 NaHCO3 @ 75 cc/hr x1  liter  - defer ACEi/ARB for now given ?RISHABH  - please dose any new medications for a CrCl of ~35 cc/min  - avoid NSAIDs/nephrotoxins as able       - a/w avoiding CTA if possible    Thank you for the courtesy of this consultation.    Gene Meléndez M.D.  [facility], [address]  [place] [address] [phone]

## 2021-03-28 NOTE — ED PROVIDER NOTE - CARE PLAN
Principal Discharge DX:	Chest pain   Principal Discharge DX:	Chest pain  Secondary Diagnosis:	2019 novel coronavirus disease (COVID-19)  Secondary Diagnosis:	Acute on chronic renal insufficiency

## 2021-03-28 NOTE — H&P ADULT - PROBLEM SELECTOR PLAN 3
- SOB with ambulation  - 98% on room air  - She is COVID+  - see above - SOB with ambulation  - 98% on room air  - She is COVID+  - d-dimer mildly elevated at 236  - check V/Q scan and US LE to eval for PE/DVT.

## 2021-03-29 ENCOUNTER — TRANSCRIPTION ENCOUNTER (OUTPATIENT)
Age: 31
End: 2021-03-29

## 2021-03-29 LAB
ANION GAP SERPL CALC-SCNC: 9 MMOL/L — SIGNIFICANT CHANGE UP (ref 7–14)
BUN SERPL-MCNC: 25 MG/DL — HIGH (ref 7–23)
CALCIUM SERPL-MCNC: 8.2 MG/DL — LOW (ref 8.4–10.5)
CHLORIDE SERPL-SCNC: 107 MMOL/L — SIGNIFICANT CHANGE UP (ref 98–107)
CO2 SERPL-SCNC: 23 MMOL/L — SIGNIFICANT CHANGE UP (ref 22–31)
COVID-19 SPIKE DOMAIN AB INTERP: POSITIVE
COVID-19 SPIKE DOMAIN ANTIBODY RESULT: 0.98 U/ML — HIGH
CREAT SERPL-MCNC: 2.53 MG/DL — HIGH (ref 0.5–1.3)
GLUCOSE SERPL-MCNC: 99 MG/DL — SIGNIFICANT CHANGE UP (ref 70–99)
HCT VFR BLD CALC: 42.8 % — SIGNIFICANT CHANGE UP (ref 34.5–45)
HGB BLD-MCNC: 13.3 G/DL — SIGNIFICANT CHANGE UP (ref 11.5–15.5)
MAGNESIUM SERPL-MCNC: 2.1 MG/DL — SIGNIFICANT CHANGE UP (ref 1.6–2.6)
MCHC RBC-ENTMCNC: 25.2 PG — LOW (ref 27–34)
MCHC RBC-ENTMCNC: 31.1 GM/DL — LOW (ref 32–36)
MCV RBC AUTO: 81.2 FL — SIGNIFICANT CHANGE UP (ref 80–100)
NRBC # BLD: 0 /100 WBCS — SIGNIFICANT CHANGE UP
NRBC # FLD: 0 K/UL — SIGNIFICANT CHANGE UP
PHOSPHATE SERPL-MCNC: 3.1 MG/DL — SIGNIFICANT CHANGE UP (ref 2.5–4.5)
PLATELET # BLD AUTO: 348 K/UL — SIGNIFICANT CHANGE UP (ref 150–400)
POTASSIUM SERPL-MCNC: 4.5 MMOL/L — SIGNIFICANT CHANGE UP (ref 3.5–5.3)
POTASSIUM SERPL-SCNC: 4.5 MMOL/L — SIGNIFICANT CHANGE UP (ref 3.5–5.3)
RBC # BLD: 5.27 M/UL — HIGH (ref 3.8–5.2)
RBC # FLD: 15.4 % — HIGH (ref 10.3–14.5)
SARS-COV-2 IGG+IGM SERPL QL IA: 0.98 U/ML — HIGH
SARS-COV-2 IGG+IGM SERPL QL IA: POSITIVE
SODIUM SERPL-SCNC: 139 MMOL/L — SIGNIFICANT CHANGE UP (ref 135–145)
WBC # BLD: 5.16 K/UL — SIGNIFICANT CHANGE UP (ref 3.8–10.5)
WBC # FLD AUTO: 5.16 K/UL — SIGNIFICANT CHANGE UP (ref 3.8–10.5)

## 2021-03-29 PROCEDURE — 99233 SBSQ HOSP IP/OBS HIGH 50: CPT

## 2021-03-29 PROCEDURE — 93970 EXTREMITY STUDY: CPT | Mod: 26

## 2021-03-29 RX ORDER — DEXAMETHASONE 0.5 MG/5ML
6 ELIXIR ORAL ONCE
Refills: 0 | Status: COMPLETED | OUTPATIENT
Start: 2021-03-29 | End: 2021-03-29

## 2021-03-29 RX ORDER — LOSARTAN POTASSIUM 100 MG/1
100 TABLET, FILM COATED ORAL DAILY
Refills: 0 | Status: ACTIVE | OUTPATIENT
Start: 2021-03-29 | End: 2022-02-25

## 2021-03-29 RX ADMIN — HEPARIN SODIUM 5000 UNIT(S): 5000 INJECTION INTRAVENOUS; SUBCUTANEOUS at 06:50

## 2021-03-29 RX ADMIN — HEPARIN SODIUM 5000 UNIT(S): 5000 INJECTION INTRAVENOUS; SUBCUTANEOUS at 17:53

## 2021-03-29 RX ADMIN — SODIUM CHLORIDE 75 MILLILITER(S): 9 INJECTION, SOLUTION INTRAVENOUS at 00:46

## 2021-03-29 RX ADMIN — Medication 6 MILLIGRAM(S): at 14:02

## 2021-03-29 RX ADMIN — LOSARTAN POTASSIUM 100 MILLIGRAM(S): 100 TABLET, FILM COATED ORAL at 14:01

## 2021-03-29 NOTE — DISCHARGE NOTE PROVIDER - NSDCMRMEDTOKEN_GEN_ALL_CORE_FT
atorvastatin 40 mg oral tablet: 1 tab(s) orally once a day (at bedtime)  Bactrim  mg-160 mg oral tablet: 1 tab(s) orally every other day  losartan 25 mg oral tablet: 1 tab(s) orally once a day  Ortho Tri-Cyclen oral tablet: 1 tab(s) orally once a day  predniSONE 20 mg oral tablet: 3 tab(s) orally once a day  predniSONE 20 mg oral tablet: 2 tab(s) orally once a day   Protonix 40 mg oral delayed release tablet: 1 tab(s) orally once a day  Vitamin D3 50,000 intl units oral capsule: 1 cap(s) orally once a week   acetaminophen 325 mg oral tablet: 2 tab(s) orally every 6 hours, As needed, Temp greater or equal to 38C (100.4F), Mild Pain (1 - 3)  losartan 100 mg oral tablet: 1 tab(s) orally once a day  simethicone 80 mg oral tablet, chewable: 1 tab(s) orally 3 times a day, As needed, Gas

## 2021-03-29 NOTE — DISCHARGE NOTE PROVIDER - NSDCFUADDAPPT_GEN_ALL_CORE_FT
Please follow up with your primary care provider in 1 to 2 weeks for further care. If you don't have a primary care provider please follow up at our Medicine Clinic at  [facility] [address] [place] [address] [phone] or [phone]  (please call to make appointment)

## 2021-03-29 NOTE — DISCHARGE NOTE PROVIDER - NSDCCPCAREPLAN_GEN_ALL_CORE_FT
PRINCIPAL DISCHARGE DIAGNOSIS  Diagnosis: Chest pain  Assessment and Plan of Treatment:       SECONDARY DISCHARGE DIAGNOSES  Diagnosis: Acute on chronic renal insufficiency  Assessment and Plan of Treatment: As outpt will need to check [employer] genetic testing for APO-1 segment  Avoid Motrin, Advil or Aleve        Diagnosis: 2019 novel coronavirus disease (COVID-19)  Assessment and Plan of Treatment: 2019 novel coronavirus disease (COVID-19)     PRINCIPAL DISCHARGE DIAGNOSIS  Diagnosis: Chest pain  Assessment and Plan of Treatment:       SECONDARY DISCHARGE DIAGNOSES  Diagnosis: 2019 novel coronavirus disease (COVID-19)  Assessment and Plan of Treatment: You have been diagnosed with the COVID-19 virus during your hospital stay. You must self quarantine to complete a 14 day time period.  Monitor for fevers, shortness of breath and cough primarily.  Monitor your temperature daily to not any changes and increases.    It has been determined that you no longer need hospitalization and can recover while remaining in self-quarantine at home. You should follow the prevention steps below until a healthcare provider or local or state health department says you can return to your normal activities.  1. You should restrict activities outside your home, except for getting medical care.  2. Do not go to work, school, or public areas.  3. Avoid using public transportation, ride-sharing, or taxis.  4. Separate yourself from other people and animals in your home.  5. Call ahead before visiting your doctor.  6. Wear a facemask.  7. Cover your coughs and sneezes.  8. Clean your hands often.  9. Avoid sharing personal household items.  10. Clean all “high-touch” surfaces everyday.  11. Monitor your symptoms.  If you have a medical emergency and need to call 911, notify the dispatch personnel that you have COVID-19 If possible, put on a facemask before emergency medical services arrive.  12. Stopping home isolation.  Patients with confirmed COVID-19 should remain under home isolation precautions for 14 days since the positive COVID-19 test and until the risk of secondary transmission to others is thought to be low. The decision to discontinue home isolation precautions should be made on a case-by-case basis, in consultation with healthcare providers and state and local health departments. Your [facility] Department of Health can be reached at [phone] for further information about COVID-19.    Diagnosis: Acute on chronic renal insufficiency  Assessment and Plan of Treatment: As outpt will need to check [employer] genetic testing for APO-1 segment  Avoid Motrin, Advil or Aleve         PRINCIPAL DISCHARGE DIAGNOSIS  Diagnosis: Chest pain  Assessment and Plan of Treatment: You came in with chest pain. Your ECG and cardiac enzymes were not indicative of any acute ischemic changes. You had a VQ scan that did not show evidence of blood clot in your lungs. Your pain was likely musculoskeletal.   Please follow up with your cardiologist within 1 to 2 weeks for further management. Return to ED if any new or worsening symptoms such as worsenign headache, dizziness, chest pain, palpitations, shortness of breath, neck pain, jaw pain, arm pain, abdominal pain, nausea, or vomiting.      SECONDARY DISCHARGE DIAGNOSES  Diagnosis: 2019 novel coronavirus disease (COVID-19)  Assessment and Plan of Treatment: You have been diagnosed with the COVID-19 virus during your hospital stay. You must self quarantine to complete a 14 day time period.  Monitor for fevers, shortness of breath and cough primarily.  Monitor your temperature daily to not any changes and increases.    It has been determined that you no longer need hospitalization and can recover while remaining in self-quarantine at home. You should follow the prevention steps below until a healthcare provider or local or state health department says you can return to your normal activities.  1. You should restrict activities outside your home, except for getting medical care.  2. Do not go to work, school, or public areas.  3. Avoid using public transportation, ride-sharing, or taxis.  4. Separate yourself from other people and animals in your home.  5. Call ahead before visiting your doctor.  6. Wear a facemask.  7. Cover your coughs and sneezes.  8. Clean your hands often.  9. Avoid sharing personal household items.  10. Clean all “high-touch” surfaces everyday.  11. Monitor your symptoms.  If you have a medical emergency and need to call 911, notify the dispatch personnel that you have COVID-19 If possible, put on a facemask before emergency medical services arrive.  12. Stopping home isolation.  Patients with confirmed COVID-19 should remain under home isolation precautions for 14 days since the positive COVID-19 test and until the risk of secondary transmission to others is thought to be low. The decision to discontinue home isolation precautions should be made on a case-by-case basis, in consultation with healthcare providers and state and local health departments. Your [facility] Department of Health can be reached at [phone] for further information about COVID-19.    Diagnosis: Acute on chronic renal insufficiency  Assessment and Plan of Treatment: As outpt will need to check [facility] genetic testing for APO-1 segment  Avoid Motrin, Advil or Aleve         PRINCIPAL DISCHARGE DIAGNOSIS  Diagnosis: Chest pain  Assessment and Plan of Treatment: You came in with chest pain. Your ECG and cardiac enzymes were not indicative of any acute ischemic changes. You had a VQ scan that did not show evidence of blood clot in your lungs. Your pain was likely musculoskeletal.   Please follow up with your cardiologist within 1 to 2 weeks for further management. Return to ED if any new or worsening symptoms such as worsenign headache, dizziness, chest pain, palpitations, shortness of breath, neck pain, jaw pain, arm pain, abdominal pain, nausea, or vomiting.      SECONDARY DISCHARGE DIAGNOSES  Diagnosis: 2019 novel coronavirus disease (COVID-19)  Assessment and Plan of Treatment: You have been diagnosed with the COVID-19 virus during your hospital stay. You must self quarantine until 4/11/21. Monitor for fevers, shortness of breath and cough primarily.  Monitor your temperature daily to not any changes and increases.    It has been determined that you no longer need hospitalization and can recover while remaining in self-quarantine at home. You should follow the prevention steps below until a healthcare provider or local or state health department says you can return to your normal activities.  1. You should restrict activities outside your home, except for getting medical care.  2. Do not go to work, school, or public areas.  3. Avoid using public transportation, ride-sharing, or taxis.  4. Separate yourself from other people and animals in your home.  5. Call ahead before visiting your doctor.  6. Wear a facemask.  7. Cover your coughs and sneezes.  8. Clean your hands often.  9. Avoid sharing personal household items.  10. Clean all “high-touch” surfaces everyday.  11. Monitor your symptoms.  If you have a medical emergency and need to call 911, notify the dispatch personnel that you have COVID-19 If possible, put on a facemask before emergency medical services arrive.  12. Stopping home isolation.  Patients with confirmed COVID-19 should remain under home isolation precautions for 14 days since the positive COVID-19 test and until the risk of secondary transmission to others is thought to be low. The decision to discontinue home isolation precautions should be made on a case-by-case basis, in consultation with healthcare providers and state and local health departments. Your [facility] Department of Health can be reached at [phone] for further information about COVID-19.    Diagnosis: Acute on chronic renal insufficiency  Assessment and Plan of Treatment: As outpt will need to check [employer] genetic testing for APO-1 segment  Avoid Motrin, Advil or Aleve. Continue losartan 100mg 1 tab orally daily. Follow up with your primary care provider and nephrologist in 2 weeks

## 2021-03-29 NOTE — DISCHARGE NOTE PROVIDER - HOSPITAL COURSE
30 year old female with hx of FSGS is here for chest pain and SOB. She is being admitted for PE evaluation and COVID symptom management. 30 year old female with hx of FSGS is here for chest pain and SOB. She is being admitted for PE evaluation and COVID symptom management.     Chest pain.    Pt reporting chest pain that occurs with coughing. ACS ruled out. PE less likely, VQ scan with PE not likely. D-dimer also low. Likely musculoskeletal.   Continue to monitor.       2019 novel coronavirus disease (COVID-19).    COVID positive,  98% on RA, w/ mild SOB w/ ambulation. Inflammatory markers mildly elevated. D-dimer ~200.   does not qualify for remdesivir  incentive spirometry   No need for oxygen on discharge    SOB (shortness of breath).   SOB with ambulation  98% on room air  She is COVID+  d-dimer mildly elevated at 236  V/Q neg for PE     RISHABH (acute kidney injury).    Likely due to poor PO intake, diarrhea and vomiting in the setting of COVID-19 infection. Cr mildly increased today  Dr Cecilio Colvin following  As per renal, cont losartan 100mg qd.   D/c IVF  No need for renal ultrasound.     FSGS (focal segmental glomerulosclerosis).    renal Dr Colvin following.     On ___ this case was reviewed with . ____, the patient is medically stable and optimized for discharge. All medications were reviewed and prescriptions were sent to mutually agreed upon pharmacy.   30 year old female with hx of FSGS is here for chest pain and SOB. She is being admitted for PE evaluation and COVID symptom management.     Chest pain.    Pt reporting chest pain that occurs with coughing. ACS ruled out. PE less likely, VQ scan with PE not likely. D-dimer also low. Likely musculoskeletal.   Continue to monitor.     2019 novel coronavirus disease (COVID-19).    COVID positive,  98% on RA, w/ mild SOB w/ ambulation. Inflammatory markers mildly elevated. D-dimer ~200.   does not qualify for remdesivir  incentive spirometry   No need for oxygen on discharge    SOB (shortness of breath).   SOB with ambulation  98% on room air  She is COVID+  d-dimer mildly elevated at 236  V/Q neg for PE     RISHABH (acute kidney injury).    Likely due to poor PO intake, diarrhea and vomiting in the setting of COVID-19 infection. Cr mildly increased today  Dr Cecilio Colvin following  As per renal, cont losartan 100mg qd.   D/c IVF  No need for renal ultrasound.     FSGS (focal segmental glomerulosclerosis).    renal Dr Colvin following.     On 3/31/21 this case was reviewed with Dr. Bedolla, the patient is medically stable and optimized for discharge. All medications were reviewed and prescriptions were sent to mutually agreed upon pharmacy.   30 year old female with hx of FSGS is here for chest pain and SOB. She is being admitted for PE evaluation and COVID symptom management. Patient also noted to have an RISHABH likely 2/2 COVID + FSGS progression.     Chest pain.    Pt reporting chest pain that occurs with coughing. ACS ruled out. PE less likely, VQ scan with PE not likely. D-dimer also low. Likely musculoskeletal.   Continue to monitor.     2019 novel coronavirus disease (COVID-19).    COVID positive,  98% on RA, w/ mild SOB w/ ambulation. Inflammatory markers mildly elevated. D-dimer ~200.   does not qualify for remdesivir  incentive spirometry   No need for oxygen on discharge      RISHABH (acute kidney injury).    Likely due to poor PO intake, diarrhea and vomiting in the setting of COVID-19 infection.  Dr Cecilio Colvin following  As per renal, cont losartan 100mg qd.   Follow up in 1 month    FSGS (focal segmental glomerulosclerosis).    renal Dr Colvin following.     On 3/31/21 this case was reviewed with Dr. Bedolla, the patient is medically stable and optimized for discharge. All medications were reviewed and prescriptions were sent to mutually agreed upon pharmacy.

## 2021-03-29 NOTE — DISCHARGE NOTE PROVIDER - PROVIDER TOKENS
FREE:[LAST:[Dr Cecilio Colvin M.D.],PHONE:[(   )    -],FAX:[(   )    -],ADDRESS:[[facility], [address]  [place] [address] [phone]]] FREE:[LAST:[Dr Cecilio Colvin M.D.],PHONE:[(   )    -],FAX:[(   )    -],ADDRESS:[[facility], [address] [address] [phone]]],FREE:[LAST:[YOUR PRIMARY],FIRST:[CARE PROVIDER],PHONE:[(   )    -],FAX:[(   )    -],FOLLOWUP:[1 week]]

## 2021-03-29 NOTE — DISCHARGE NOTE PROVIDER - CARE PROVIDER_API CALL
Dr Cecilio Colvin M.D.,   [facility], [facility]  [address]  [place] [address] [phone]  Phone: (   )    -  Fax: (   )    -  Follow Up Time:    Dr Cecilio Colvin M.D.,   [facility], [facility]  [address]  [place] [address] [phone]  Phone: (   )    -  Fax: (   )    -  Follow Up Time:     YOUR PRIMARY, CARE PROVIDER  Phone: (   )    -  Fax: (   )    -  Follow Up Time: 1 week

## 2021-03-30 PROCEDURE — 99233 SBSQ HOSP IP/OBS HIGH 50: CPT

## 2021-03-30 RX ADMIN — HEPARIN SODIUM 5000 UNIT(S): 5000 INJECTION INTRAVENOUS; SUBCUTANEOUS at 06:12

## 2021-03-30 RX ADMIN — HEPARIN SODIUM 5000 UNIT(S): 5000 INJECTION INTRAVENOUS; SUBCUTANEOUS at 18:33

## 2021-03-30 RX ADMIN — LOSARTAN POTASSIUM 100 MILLIGRAM(S): 100 TABLET, FILM COATED ORAL at 06:12

## 2021-03-31 ENCOUNTER — TRANSCRIPTION ENCOUNTER (OUTPATIENT)
Age: 31
End: 2021-03-31

## 2021-03-31 VITALS
HEART RATE: 77 BPM | RESPIRATION RATE: 18 BRPM | SYSTOLIC BLOOD PRESSURE: 140 MMHG | TEMPERATURE: 98 F | OXYGEN SATURATION: 100 % | DIASTOLIC BLOOD PRESSURE: 98 MMHG

## 2021-03-31 LAB
ALBUMIN SERPL ELPH-MCNC: 2.5 G/DL — LOW (ref 3.3–5)
ALP SERPL-CCNC: 58 U/L — SIGNIFICANT CHANGE UP (ref 40–120)
ALT FLD-CCNC: 20 U/L — SIGNIFICANT CHANGE UP (ref 4–33)
ANION GAP SERPL CALC-SCNC: 10 MMOL/L — SIGNIFICANT CHANGE UP (ref 7–14)
AST SERPL-CCNC: 20 U/L — SIGNIFICANT CHANGE UP (ref 4–32)
BASOPHILS # BLD AUTO: 0.04 K/UL — SIGNIFICANT CHANGE UP (ref 0–0.2)
BASOPHILS NFR BLD AUTO: 0.5 % — SIGNIFICANT CHANGE UP (ref 0–2)
BILIRUB SERPL-MCNC: <0.2 MG/DL — SIGNIFICANT CHANGE UP (ref 0.2–1.2)
BUN SERPL-MCNC: 34 MG/DL — HIGH (ref 7–23)
CALCIUM SERPL-MCNC: 8 MG/DL — LOW (ref 8.4–10.5)
CHLORIDE SERPL-SCNC: 107 MMOL/L — SIGNIFICANT CHANGE UP (ref 98–107)
CO2 SERPL-SCNC: 22 MMOL/L — SIGNIFICANT CHANGE UP (ref 22–31)
CREAT SERPL-MCNC: 2.24 MG/DL — HIGH (ref 0.5–1.3)
CRP SERPL-MCNC: 4 MG/L — SIGNIFICANT CHANGE UP
EOSINOPHIL # BLD AUTO: 0.11 K/UL — SIGNIFICANT CHANGE UP (ref 0–0.5)
EOSINOPHIL NFR BLD AUTO: 1.3 % — SIGNIFICANT CHANGE UP (ref 0–6)
FERRITIN SERPL-MCNC: 110 NG/ML — SIGNIFICANT CHANGE UP (ref 15–150)
GLUCOSE SERPL-MCNC: 93 MG/DL — SIGNIFICANT CHANGE UP (ref 70–99)
HCT VFR BLD CALC: 39 % — SIGNIFICANT CHANGE UP (ref 34.5–45)
HGB BLD-MCNC: 11.7 G/DL — SIGNIFICANT CHANGE UP (ref 11.5–15.5)
IANC: 4.35 K/UL — SIGNIFICANT CHANGE UP (ref 1.5–8.5)
IMM GRANULOCYTES NFR BLD AUTO: 0.5 % — SIGNIFICANT CHANGE UP (ref 0–1.5)
LDH SERPL L TO P-CCNC: 201 U/L — SIGNIFICANT CHANGE UP (ref 135–225)
LYMPHOCYTES # BLD AUTO: 3.4 K/UL — HIGH (ref 1–3.3)
LYMPHOCYTES # BLD AUTO: 40.2 % — SIGNIFICANT CHANGE UP (ref 13–44)
MCHC RBC-ENTMCNC: 23.7 PG — LOW (ref 27–34)
MCHC RBC-ENTMCNC: 30 GM/DL — LOW (ref 32–36)
MCV RBC AUTO: 79.1 FL — LOW (ref 80–100)
MONOCYTES # BLD AUTO: 0.52 K/UL — SIGNIFICANT CHANGE UP (ref 0–0.9)
MONOCYTES NFR BLD AUTO: 6.1 % — SIGNIFICANT CHANGE UP (ref 2–14)
NEUTROPHILS # BLD AUTO: 4.35 K/UL — SIGNIFICANT CHANGE UP (ref 1.8–7.4)
NEUTROPHILS NFR BLD AUTO: 51.4 % — SIGNIFICANT CHANGE UP (ref 43–77)
NRBC # BLD: 0 /100 WBCS — SIGNIFICANT CHANGE UP
NRBC # FLD: 0 K/UL — SIGNIFICANT CHANGE UP
PLATELET # BLD AUTO: 412 K/UL — HIGH (ref 150–400)
POTASSIUM SERPL-MCNC: 4.7 MMOL/L — SIGNIFICANT CHANGE UP (ref 3.5–5.3)
POTASSIUM SERPL-SCNC: 4.7 MMOL/L — SIGNIFICANT CHANGE UP (ref 3.5–5.3)
PROT SERPL-MCNC: 5.7 G/DL — LOW (ref 6–8.3)
RBC # BLD: 4.93 M/UL — SIGNIFICANT CHANGE UP (ref 3.8–5.2)
RBC # FLD: 15.3 % — HIGH (ref 10.3–14.5)
SODIUM SERPL-SCNC: 139 MMOL/L — SIGNIFICANT CHANGE UP (ref 135–145)
WBC # BLD: 8.46 K/UL — SIGNIFICANT CHANGE UP (ref 3.8–10.5)
WBC # FLD AUTO: 8.46 K/UL — SIGNIFICANT CHANGE UP (ref 3.8–10.5)

## 2021-03-31 PROCEDURE — 99238 HOSP IP/OBS DSCHRG MGMT 30/<: CPT

## 2021-03-31 RX ORDER — LOSARTAN POTASSIUM 100 MG/1
1 TABLET, FILM COATED ORAL
Qty: 0 | Refills: 0 | DISCHARGE

## 2021-03-31 RX ORDER — LOSARTAN POTASSIUM 100 MG/1
1 TABLET, FILM COATED ORAL
Qty: 0 | Refills: 0 | DISCHARGE
Start: 2021-03-31

## 2021-03-31 RX ORDER — SIMETHICONE 80 MG/1
80 TABLET, CHEWABLE ORAL THREE TIMES A DAY
Refills: 0 | Status: ACTIVE | OUTPATIENT
Start: 2021-03-31 | End: 2022-02-27

## 2021-03-31 RX ORDER — SIMETHICONE 80 MG/1
1 TABLET, CHEWABLE ORAL
Qty: 21 | Refills: 0
Start: 2021-03-31 | End: 2021-04-06

## 2021-03-31 RX ORDER — FAMOTIDINE 10 MG/ML
20 INJECTION INTRAVENOUS
Refills: 0 | Status: DISCONTINUED | OUTPATIENT
Start: 2021-03-31 | End: 2021-03-31

## 2021-03-31 RX ORDER — ACETAMINOPHEN 500 MG
2 TABLET ORAL
Qty: 0 | Refills: 0 | DISCHARGE
Start: 2021-03-31

## 2021-03-31 RX ADMIN — LOSARTAN POTASSIUM 100 MILLIGRAM(S): 100 TABLET, FILM COATED ORAL at 06:23

## 2021-03-31 RX ADMIN — HEPARIN SODIUM 5000 UNIT(S): 5000 INJECTION INTRAVENOUS; SUBCUTANEOUS at 06:23

## 2021-03-31 NOTE — DISCHARGE NOTE NURSING/CASE MANAGEMENT/SOCIAL WORK - PATIENT PORTAL LINK FT
You can access the FollowMyHealth Patient Portal offered by [facility] by registering at the following website: [email or web address]. By joining [employer]’s FollowMyHealth portal, you will also be able to view your health information using other applications (apps) compatible with our system.

## 2021-03-31 NOTE — PROGRESS NOTE ADULT - SUBJECTIVE AND OBJECTIVE BOX
NEPHROLOGY-[facility] [phone]        Patient seen and examined in bed.  She was the same         MEDICATIONS  (STANDING):  heparin   Injectable 5000 Unit(s) SubCutaneous every 12 hours  influenza   Vaccine 0.5 milliLiter(s) IntraMuscular once  losartan 100 milliGRAM(s) Oral daily      VITAL:  T(C): , Max: 36.5 (03-30-21 @ 21:51)  T(F): , Max: 97.7 (03-30-21 @ 21:51)  HR: 79 (03-31-21 @ 06:19)  BP: 144/92 (03-31-21 @ 06:19)  BP(mean): --  RR: 18 (03-31-21 @ 06:19)  SpO2: 100% (03-31-21 @ 06:19)  Wt(kg): --    I and O's:    03-30 @ 07:01  -  03-31 @ 07:00  --------------------------------------------------------  IN: 250 mL / OUT: 0 mL / NET: 250 mL          PHYSICAL EXAM:    Constitutional: NAD; obese   Neck:  No JVD  Respiratory: reduced breath sounds   Cardiovascular: S1 and S2  Gastrointestinal: BS+, soft, NT/ND  Extremities: No peripheral edema  Neurological: A/O x 3, no focal deficits  Psychiatric: Normal mood, normal affect  : No Thomas  Skin: No rashes  Access: Not applicable    LABS:                        11.7   8.46  )-----------( 412      ( 31 Mar 2021 06:26 )             39.0     03-31    139  |  107  |  34<H>  ----------------------------<  93  4.7   |  22  |  2.24<H>    Ca    8.0<L>      31 Mar 2021 06:26    TPro  5.7<L>  /  Alb  2.5<L>  /  TBili  <0.2  /  DBili  x   /  AST  20  /  ALT  20  /  AlkPhos  58  03-31          Urine Studies:          RADIOLOGY & ADDITIONAL STUDIES:            
[facility] [phone]        Patient seen and examined in bed.  She was feeling better and had no chest pain this am         MEDICATIONS  (STANDING):  heparin   Injectable 5000 Unit(s) SubCutaneous every 12 hours  influenza   Vaccine 0.5 milliLiter(s) IntraMuscular once  losartan 100 milliGRAM(s) Oral daily      VITAL:  T(C): , Max: 37.1 (03-29-21 @ 21:36)  T(F): , Max: 98.8 (03-30-21 @ 06:09)  HR: 82 (03-30-21 @ 06:09)  BP: 150/94 (03-30-21 @ 06:09)  BP(mean): --  RR: 20 (03-30-21 @ 06:09)  SpO2: 97% (03-30-21 @ 06:09)  Wt(kg): --    I and O's:    03-29 @ 07:01  -  03-30 @ 07:00  --------------------------------------------------------  IN: 930 mL / OUT: 0 mL / NET: 930 mL          PHYSICAL EXAM:    Constitutional: NAD; obese   Neck:  No JVD  Respiratory: CTAB/L  Cardiovascular: S1 and S2  Gastrointestinal: BS+, soft, NT/ND  Extremities: No peripheral edema  Neurological: A/O x 3, no focal deficits  Psychiatric: Normal mood, normal affect  : No Thomas  Skin: No rashes  Access: Not applicable    LABS:                        13.3   5.16  )-----------( 348      ( 29 Mar 2021 07:33 )             42.8     03-29    139  |  107  |  25<H>  ----------------------------<  99  4.5   |  23  |  2.53<H>    Ca    8.2<L>      29 Mar 2021 07:33  Phos  3.1     03-29  Mg     2.1     03-29            Urine Studies:    Sodium, Random Urine: 27 mmol/L (03-28 @ 22:52)  Creatinine, Random Urine: 99 mg/dL (03-28 @ 22:52)  Protein/Creatinine Ratio Calculation: TNP Ratio (03-28 @ 22:52)        RADIOLOGY & ADDITIONAL STUDIES:            < from: US Duplex Venous Lower Ext Complete, Bilateral (03.29.21 @ 14:03) >    EXAM:  US DPLX LWR EXT VEINS COMPL BI        PROCEDURE DATE:  Mar 29 2021         INTERPRETATION:  CLINICAL INFORMATION: 30-year-old female with chest pain, shortness of breath, Covid positive, kidney disease    COMPARISON: None available.    TECHNIQUE: Duplex sonography of the BILATERAL LOWER extremity veins with color and spectral Doppler, with and without compression.    FINDINGS:    RIGHT:  Normal compressibility of the RIGHT common femoral, femoral and popliteal veins.  Doppler examinationshows normal spontaneous and phasic flow.  No RIGHT calf vein thrombosis is detected.    LEFT:  Normal compressibility of the LEFT common femoral, femoral and popliteal veins.  Doppler examination shows normal spontaneous and phasic flow.  No LEFT calf vein thrombosis is detected.    IMPRESSION:  No evidence of deep venous thrombosis in either lower extremity.                  SOILA CASTILLO MD; Attending Radiologist  This document has been electronically signed. Mar 29 2021  3:14PM    < end of copied text >  < from: NM Pulmonary Perfusion Scan (03.28.21 @ 16:44) >    EXAM:  NM PULM PERFUSION IMG        PROCEDURE DATE:  Mar 28 2021       INTERPRETATION:  RADIOPHARMACEUTICAL: 3.07 mCi Tc-99m-MAA, I.V.    CLINICAL STATEMENT: 30-year-old female with elevated d-dimer and mild chest tightness.    TECHNIQUE: Perfusionimages of the lungs were obtained following administration of Tc-99m-MAA. Images were obtained in the anterior, posterior, both lateral, and all 4 oblique projections. The study was interpreted in conjunction with chest radiograph of 3/27/2021.    Noprior VQ scan.    FINDINGS: There is mildly heterogeneous tracer distribution in the lungs. There are no discrete segmental perfusion defects.    IMPRESSION:    Low probability of pulmonary embolus.      < end of copied text >  
NEPHROLOGY-[facility] [phone]        Patient seen and examined in bed.  She was in good spirits   No coughing noted and not on oxygen         MEDICATIONS  (STANDING):  heparin   Injectable 5000 Unit(s) SubCutaneous every 12 hours  influenza   Vaccine 0.5 milliLiter(s) IntraMuscular once  sodium chloride 0.45% 1000 milliLiter(s) (75 mL/Hr) IV Continuous <Continuous>      VITAL:  T(C): , Max: 37.3 (21 @ 11:47)  T(F): , Max: 99.1 (21 @ 11:47)  HR: 95 (21 @ 06:47)  BP: 133/91 (21 @ 06:47)  BP(mean): --  RR: 17 (21 @ 06:47)  SpO2: 96% (21 @ 06:47)  Wt(kg): --    I and O's:      Weight (kg): 118 ( @ 11:47)    PHYSICAL EXAM:    Constitutional: NAD; obese   Neck:  No JVD  Respiratory: CTAB/L  Cardiovascular: S1 and S2  Gastrointestinal: BS+, soft, NT/ND  Extremities: No peripheral edema  Neurological: A/O x 3, no focal deficits  Psychiatric: Normal mood, normal affect  : No Thomas  Skin: No rashes  Access: Not applicable    LABS:                        13.3   5.16  )-----------( 348      ( 29 Mar 2021 07:33 )             42.8         139  |  107  |  25<H>  ----------------------------<  99  4.5   |  23  |  2.53<H>    Ca    8.2<L>      29 Mar 2021 07:33  Phos  3.1       Mg     2.1         TPro  6.6  /  Alb  2.7<L>  /  TBili  <0.2  /  DBili  x   /  AST  23  /  ALT  21  /  AlkPhos  69            Urine Studies:  Urinalysis Basic - ( 28 Mar 2021 07:07 )    Color: Yellow / Appearance: Clear / S.023 / pH: x  Gluc: x / Ketone: Trace  / Bili: Negative / Urobili: <2 mg/dL   Blood: x / Protein: >600 mg/dL / Nitrite: Negative   Leuk Esterase: Negative / RBC: >50 /HPF / WBC 8 /HPF   Sq Epi: x / Non Sq Epi: 9 /HPF / Bacteria: Negative      Sodium, Random Urine: 27 mmol/L ( @ 22:52)  Creatinine, Random Urine: 99 mg/dL ( @ 22:52)  Protein/Creatinine Ratio Calculation: TNP Ratio ( @ 22:52)        RADIOLOGY & ADDITIONAL STUDIES:            
Hospitalist Progress Note  Authored by: Hailey Fink MD pager # [phone]    OVERNIGHT EVENTS: JIM     SUBJECTIVE / INTERVAL HPI: Patient seen and examined at bedside. Patient reports improvement in chest pain. Notices throat pain when coughing too. No shortness of breath or difficulty breathing. Urinating normally. All other ROS negative.     VITAL SIGNS:  Vital Signs Last 24 Hrs  T(C): 36.9 (29 Mar 2021 10:00), Max: 37.3 (29 Mar 2021 06:47)  T(F): 98.5 (29 Mar 2021 10:00), Max: 99.1 (29 Mar 2021 06:47)  HR: 98 (29 Mar 2021 10:00) (95 - 100)  BP: 127/90 (29 Mar 2021 10:00) (127/90 - 146/90)  BP(mean): --  RR: 20 (29 Mar 2021 10:00) (17 - 20)  SpO2: 98% (29 Mar 2021 10:00) (96% - 100%)    PHYSICAL EXAM:    General: Obese female   HEENT: NC/AT; PERRL, anicteric sclera; MMM  Neck: supple  Cardiovascular: +S1/S2; RRR  Respiratory: CTA B/L; no W/R/R  Gastrointestinal: soft, NT/ND; +BSx4  Extremities: WWP; no edema, clubbing or cyanosis  Vascular: 2+ radial, DP/PT pulses B/L  Neurological: AAOx3; no focal deficits    MEDICATIONS:  MEDICATIONS  (STANDING):  heparin   Injectable 5000 Unit(s) SubCutaneous every 12 hours  influenza   Vaccine 0.5 milliLiter(s) IntraMuscular once  losartan 100 milliGRAM(s) Oral daily  sodium chloride 0.45% 1000 milliLiter(s) (75 mL/Hr) IV Continuous <Continuous>    MEDICATIONS  (PRN):  acetaminophen   Tablet .. 650 milliGRAM(s) Oral every 6 hours PRN Temp greater or equal to 38C (100.4F), Mild Pain (1 - 3)  ALBUTerol    90 MICROgram(s) HFA Inhaler 1 Puff(s) Inhalation every 4 hours PRN Shortness of Breath and/or Wheezing      ALLERGIES:  Allergies    amoxicillin (Angioedema; Urticaria)    Intolerances        LABS:                        13.3   5.16  )-----------( 348      ( 29 Mar 2021 07:33 )             42.8         139  |  107  |  25<H>  ----------------------------<  99  4.5   |  23  |  2.53<H>    Ca    8.2<L>      29 Mar 2021 07:33  Phos  3.1       Mg     2.1         TPro  6.6  /  Alb  2.7<L>  /  TBili  <0.2  /  DBili  x   /  AST  23  /  ALT  21  /  AlkPhos  69  -    PT/INR - ( 27 Mar 2021 23:03 )   PT: 12.7 sec;   INR: 1.11 ratio         PTT - ( 27 Mar 2021 23:03 )  PTT:32.1 sec  Urinalysis Basic - ( 28 Mar 2021 07:07 )    Color: Yellow / Appearance: Clear / S.023 / pH: x  Gluc: x / Ketone: Trace  / Bili: Negative / Urobili: <2 mg/dL   Blood: x / Protein: >600 mg/dL / Nitrite: Negative   Leuk Esterase: Negative / RBC: >50 /HPF / WBC 8 /HPF   Sq Epi: x / Non Sq Epi: 9 /HPF / Bacteria: Negative      CAPILLARY BLOOD GLUCOSE          RADIOLOGY & ADDITIONAL TESTS: Reviewed.    ASSESSMENT:    PLAN: 
Progress Note    OVERNIGHT EVENTS: JIM     SUBJECTIVE / INTERVAL HPI:   No overnight events   Patient is eating and drinking well. No current complaints. Only takes losartan at home    VITAL SIGNS:  Vital Signs Last 24 Hrs  T(C): 36.9 (29 Mar 2021 10:00), Max: 37.3 (29 Mar 2021 06:47)  T(F): 98.5 (29 Mar 2021 10:00), Max: 99.1 (29 Mar 2021 06:47)  HR: 98 (29 Mar 2021 10:00) (95 - 100)  BP: 127/90 (29 Mar 2021 10:00) (127/90 - 146/90)  BP(mean): --  RR: 20 (29 Mar 2021 10:00) (17 - 20)  SpO2: 98% (29 Mar 2021 10:00) (96% - 100%)    PHYSICAL EXAM:    General: Obese female   HEENT: NC/AT; PERRL, anicteric sclera; MMM  Neck: supple  Cardiovascular: +S1/S2; RRR  Respiratory: CTA B/L; no W/R/R  Gastrointestinal: soft, NT/ND; +BSx4  Extremities: WWP; no edema, clubbing or cyanosis  Vascular: 2+ radial, DP/PT pulses B/L  Neurological: AAOx3; no focal deficits    MEDICATIONS:  MEDICATIONS  (STANDING):  heparin   Injectable 5000 Unit(s) SubCutaneous every 12 hours  influenza   Vaccine 0.5 milliLiter(s) IntraMuscular once  losartan 100 milliGRAM(s) Oral daily  sodium chloride 0.45% 1000 milliLiter(s) (75 mL/Hr) IV Continuous <Continuous>    MEDICATIONS  (PRN):  acetaminophen   Tablet .. 650 milliGRAM(s) Oral every 6 hours PRN Temp greater or equal to 38C (100.4F), Mild Pain (1 - 3)  ALBUTerol    90 MICROgram(s) HFA Inhaler 1 Puff(s) Inhalation every 4 hours PRN Shortness of Breath and/or Wheezing      ALLERGIES:  Allergies    amoxicillin (Angioedema; Urticaria)    Intolerances        LABS:                        13.3   5.16  )-----------( 348      ( 29 Mar 2021 07:33 )             42.8     03-    139  |  107  |  25<H>  ----------------------------<  99  4.5   |  23  |  2.53<H>    Ca    8.2<L>      29 Mar 2021 07:33  Phos  3.1     03-  Mg     2.1     -    TPro  6.6  /  Alb  2.7<L>  /  TBili  <0.2  /  DBili  x   /  AST  23  /  ALT  21  /  AlkPhos  69  03-27    PT/INR - ( 27 Mar 2021 23:03 )   PT: 12.7 sec;   INR: 1.11 ratio         PTT - ( 27 Mar 2021 23:03 )  PTT:32.1 sec  Urinalysis Basic - ( 28 Mar 2021 07:07 )    Color: Yellow / Appearance: Clear / S.023 / pH: x  Gluc: x / Ketone: Trace  / Bili: Negative / Urobili: <2 mg/dL   Blood: x / Protein: >600 mg/dL / Nitrite: Negative   Leuk Esterase: Negative / RBC: >50 /HPF / WBC 8 /HPF   Sq Epi: x / Non Sq Epi: 9 /HPF / Bacteria: Negative      CAPILLARY BLOOD GLUCOSE          RADIOLOGY & ADDITIONAL TESTS: Reviewed.    ASSESSMENT:    PLAN: 
Progress Note    OVERNIGHT EVENTS: JIM     SUBJECTIVE / INTERVAL HPI:   No overnight events   Patient is eating and drinking at 50% of usual PO intake. Feels well, slightly SOB with ambulation     VITAL SIGNS:  Vital Signs Last 24 Hrs  T(C): 36.9 (29 Mar 2021 10:00), Max: 37.3 (29 Mar 2021 06:47)  T(F): 98.5 (29 Mar 2021 10:00), Max: 99.1 (29 Mar 2021 06:47)  HR: 98 (29 Mar 2021 10:00) (95 - 100)  BP: 127/90 (29 Mar 2021 10:00) (127/90 - 146/90)  BP(mean): --  RR: 20 (29 Mar 2021 10:00) (17 - 20)  SpO2: 98% (29 Mar 2021 10:00) (96% - 100%)    PHYSICAL EXAM:    General: Obese female   HEENT: NC/AT; PERRL, anicteric sclera; MMM  Neck: supple  Cardiovascular: +S1/S2; RRR  Respiratory: CTA B/L; no W/R/R  Gastrointestinal: soft, NT/ND; +BSx4  Extremities: WWP; no edema, clubbing or cyanosis  Vascular: 2+ radial, DP/PT pulses B/L  Neurological: AAOx3; no focal deficits    MEDICATIONS:  MEDICATIONS  (STANDING):  heparin   Injectable 5000 Unit(s) SubCutaneous every 12 hours  influenza   Vaccine 0.5 milliLiter(s) IntraMuscular once  losartan 100 milliGRAM(s) Oral daily  sodium chloride 0.45% 1000 milliLiter(s) (75 mL/Hr) IV Continuous <Continuous>    MEDICATIONS  (PRN):  acetaminophen   Tablet .. 650 milliGRAM(s) Oral every 6 hours PRN Temp greater or equal to 38C (100.4F), Mild Pain (1 - 3)  ALBUTerol    90 MICROgram(s) HFA Inhaler 1 Puff(s) Inhalation every 4 hours PRN Shortness of Breath and/or Wheezing      ALLERGIES:  Allergies    amoxicillin (Angioedema; Urticaria)    Intolerances        LABS:                        13.3   5.16  )-----------( 348      ( 29 Mar 2021 07:33 )             42.8     03-    139  |  107  |  25<H>  ----------------------------<  99  4.5   |  23  |  2.53<H>    Ca    8.2<L>      29 Mar 2021 07:33  Phos  3.1     -  Mg     2.1     -    TPro  6.6  /  Alb  2.7<L>  /  TBili  <0.2  /  DBili  x   /  AST  23  /  ALT  21  /  AlkPhos  69  03-    PT/INR - ( 27 Mar 2021 23:03 )   PT: 12.7 sec;   INR: 1.11 ratio         PTT - ( 27 Mar 2021 23:03 )  PTT:32.1 sec  Urinalysis Basic - ( 28 Mar 2021 07:07 )    Color: Yellow / Appearance: Clear / S.023 / pH: x  Gluc: x / Ketone: Trace  / Bili: Negative / Urobili: <2 mg/dL   Blood: x / Protein: >600 mg/dL / Nitrite: Negative   Leuk Esterase: Negative / RBC: >50 /HPF / WBC 8 /HPF   Sq Epi: x / Non Sq Epi: 9 /HPF / Bacteria: Negative      CAPILLARY BLOOD GLUCOSE          RADIOLOGY & ADDITIONAL TESTS: Reviewed.    ASSESSMENT:    PLAN:

## 2021-03-31 NOTE — PROGRESS NOTE ADULT - PROBLEM SELECTOR PLAN 2
COVID positive,  98% on RA, w/ mild SOB w/ ambulation. Inflammatory markers mildly elevated. D-dimer ~200.   - does not qualify for remdesivir  - incentive spirometry   [ ] Ambulatory pulse ox
COVID positive but no SOB and 98% on RA. Inflammatory markers mildly elevated. D-dimer ~200.   - does not qualify for remdesivir  - incentive spirometry   - monitor respiratory status
COVID positive,  98% on RA, w/ mild SOB w/ ambulation. Inflammatory markers mildly elevated. D-dimer ~200.   - does not qualify for remdesivir  - incentive spirometry   - Passed ambulatory pulse ox

## 2021-03-31 NOTE — PROGRESS NOTE ADULT - ASSESSMENT
30 year old female with hx of FSGS is here for chest pain and SOB. She is being admitted for PE evaluation and COVID symptom management. 
30 year old female with hx of FSGS is here for chest pain and SOB. She is being admitted for PE evaluation and COVID symptom management. 
Patient is a 30y Female with CKD and HTN recently Dx'd with COVID-19 p/w CP and SOB c/b acute on chronic kidney injury.  - CKD: stage 3 due to collapsing FSGS with baseline creatinine (as of Oct) 1.8  - RISHABH: nonoliguric. Possibly prerenal RISHABH vs Bactrim-induced vs progression of CKD  - metabolic acidosis (non AG): due to CKD/RISHABH  -  on losartan for proteinuria and not BP     RECOMMENDATIONS:  - check urine   protein/creatinine ratio  - No need for renal US  - DC  IVF now   - Resume Cozaar 100mg po qd.  As outpt will check [employer] genetic testing for APO-1 segment   - please dose any new medications for a CrCl of ~35 cc/min  - avoid NSAIDs/nephrotoxins as able  - a/w avoiding CTA if possible(low suspicion for PE);  Check pain is isolated to breast bone and from musculoskeletal--Dexamethasone 6mg ivp x 1     Thank you for the courtesy of this consultation.    Cecilio Colvin M.D.  [facility]  [address]  [place] [address] [phone]        
Patient is a 30y Female with CKD and HTN recently Dx'd with COVID-19 p/w CP and SOB c/b acute on chronic kidney injury.  - CKD: stage 3 due to collapsing FSGS with baseline creatinine (as of Oct) 1.8  - RISHABH: nonoliguric. Possibly prerenal RISHABH vs Bactrim-induced vs progression of CKD  - metabolic acidosis (non AG): due to CKD/RISHABH  -  on losartan for proteinuria and not BP     RECOMMENDATIONS:  - Back on  Cozaar 100mg po qd.  As outpt will check [employer] genetic testing for APO-1 segment   - please dose any new medications for a CrCl of ~35 cc/min  - avoid NSAIDs/nephrotoxins as able  -  Sp Dexamethasone 6mg ivp x 1.  no more chest pain     DC planning     Sayed ALISHA Colvin.  [facility], [address]  [place] [address] [phone]    
Patient is a 30y Female with CKD and HTN recently Dx'd with COVID-19 p/w CP and SOB c/b acute on chronic kidney injury.  - CKD: stage 3 due to collapsing FSGS with baseline creatinine (as of Oct) 1.8  - RISHABH: nonoliguric. Possibly prerenal RISHABH vs Bactrim-induced vs progression of CKD  - metabolic acidosis (non AG): due to CKD/RISHABH  -  on losartan for proteinuria and not BP     RECOMMENDATIONS:  - Back on  Cozaar 100mg po qd.  As outpt will check [employer] genetic testing for APO-1 segment   - please dose any new medications for a CrCl of ~35 cc/min  - avoid NSAIDs/nephrotoxins as able  -  Sp Dexamethasone 6mg ivp x 1     DC planning     Sayed ALISHA Colvin.  [facility], [address]  [place] [address] [phone]    
30 year old female with hx of FSGS is here for chest pain and SOB. She is being admitted for PE evaluation and COVID symptom management. 
No

## 2021-03-31 NOTE — PROGRESS NOTE ADULT - PROBLEM SELECTOR PROBLEM 5
FSGS (focal segmental glomerulosclerosis)

## 2021-03-31 NOTE — PROGRESS NOTE ADULT - PROBLEM SELECTOR PLAN 3
- SOB with ambulation  - 98% on room air  - She is COVID+  - d-dimer mildly elevated at 236  - V/Q neg for PE   [ ] Ambulatory pulse ox
- SOB with ambulation  - 98% on room air  - She is COVID+  - d-dimer mildly elevated at 236  - check V/Q scan and US LE to eval for PE/DVT.
- SOB with ambulation  - 98% on room air  - She is COVID+  - d-dimer mildly elevated at 236  - V/Q neg for PE

## 2021-03-31 NOTE — PROGRESS NOTE ADULT - PROBLEM SELECTOR PLAN 5
- renal Dr Ali following
- renal Dr Colvin consulted.
- renal Dr Ali following
Spine appears normal, range of motion is not limited, no muscle or joint tenderness

## 2021-03-31 NOTE — DISCHARGE NOTE NURSING/CASE MANAGEMENT/SOCIAL WORK - NSDCFUADDAPPT_GEN_ALL_CORE_FT
Please follow up with your primary care provider in 1 to 2 weeks for further care. If you don't have a primary care provider please follow up at our Medicine Clinic at  [facility] [place] [address] [phone] or [phone]  (please call to make appointment)

## 2021-03-31 NOTE — PROGRESS NOTE ADULT - PROBLEM SELECTOR PLAN 4
Likely due to poor PO intake, diarrhea and vomiting in the setting of COVID-19 infection. Cr mildly increased today  -  Sayed Ali following  - As per renal, cont losartan 100mg qd.   - D/c IVF  - No need for renal ultrasound
Likely due to poor PO intake, diarrhea and vomiting in the setting of COVID-19 infection.   - She follows up with Dr Cecilio Colvin as outpatient - will consult him  - As per renal, restart losartan 100mg qd.   - D/c IVF  - No need for renal ultrasound
Likely due to poor PO intake, diarrhea and vomiting in the setting of COVID-19 infection. Cr mildly increased today  -  Sayed Ali following  - As per renal, cont losartan 100mg qd.   - D/c IVF  - No need for renal ultrasound

## 2021-03-31 NOTE — PROGRESS NOTE ADULT - PROBLEM SELECTOR PLAN 1
Pt reporting chest pain that occurs with coughing. ACS ruled out. PE less likely, VQ scan with PE not likely. D-dimer also low. Likely musculoskeletal.   - Continue to monitor

## 2021-09-13 NOTE — PROGRESS NOTE ADULT - PROVIDER SPECIALTY LIST ADULT
Hospitalist
Nephrology
If you are a smoker, it is important for your health to stop smoking. Please be aware that second hand smoke is also harmful.
Hospitalist
Hospitalist

## 2022-07-05 ENCOUNTER — APPOINTMENT (OUTPATIENT)
Dept: OBGYN | Facility: CLINIC | Age: 32
End: 2022-07-05

## 2022-07-05 VITALS
DIASTOLIC BLOOD PRESSURE: 105 MMHG | WEIGHT: 262 LBS | SYSTOLIC BLOOD PRESSURE: 159 MMHG | HEIGHT: 67 IN | HEART RATE: 84 BPM | BODY MASS INDEX: 41.12 KG/M2

## 2022-07-05 DIAGNOSIS — Z20.2 CONTACT WITH AND (SUSPECTED) EXPOSURE TO INFECTIONS WITH A PREDOMINANTLY SEXUAL MODE OF TRANSMISSION: ICD-10-CM

## 2022-07-05 PROCEDURE — 99212 OFFICE O/P EST SF 10 MIN: CPT | Mod: 25

## 2022-07-05 PROCEDURE — 99395 PREV VISIT EST AGE 18-39: CPT

## 2022-07-09 LAB
C TRACH RRNA SPEC QL NAA+PROBE: NOT DETECTED
HBV SURFACE AG SER QL: NONREACTIVE
HCV RNA SERPL NAA+PROBE-LOG IU: NOT DETECTED LOGIU/ML
HEPC RNA INTERP: NOT DETECTED
HIV1+2 AB SPEC QL IA.RAPID: NONREACTIVE
HPV HIGH+LOW RISK DNA PNL CVX: NOT DETECTED
N GONORRHOEA RRNA SPEC QL NAA+PROBE: NOT DETECTED
SOURCE AMPLIFICATION: NORMAL
T PALLIDUM AB SER QL IA: NEGATIVE

## 2022-07-29 ENCOUNTER — APPOINTMENT (OUTPATIENT)
Dept: OBGYN | Facility: CLINIC | Age: 32
End: 2022-07-29

## 2022-07-31 LAB — CYTOLOGY CVX/VAG DOC THIN PREP: NORMAL

## 2023-04-18 NOTE — PROGRESS NOTE ADULT - PROBLEM SELECTOR PROBLEM 2
2019 novel coronavirus disease (COVID-19)
Fluconazole Counseling:  Patient counseled regarding adverse effects of fluconazole including but not limited to headache, diarrhea, nausea, upset stomach, liver function test abnormalities, taste disturbance, and stomach pain.  There is a rare possibility of liver failure that can occur when taking fluconazole.  The patient understands that monitoring of LFTs and kidney function test may be required, especially at baseline. The patient verbalized understanding of the proper use and possible adverse effects of fluconazole.  All of the patient's questions and concerns were addressed.

## 2023-06-16 ENCOUNTER — EMERGENCY (EMERGENCY)
Facility: HOSPITAL | Age: 33
LOS: 1 days | Discharge: HOME | End: 2023-06-16
Attending: STUDENT IN AN ORGANIZED HEALTH CARE EDUCATION/TRAINING PROGRAM | Admitting: STUDENT IN AN ORGANIZED HEALTH CARE EDUCATION/TRAINING PROGRAM
Payer: COMMERCIAL

## 2023-06-16 VITALS
HEART RATE: 86 BPM | OXYGEN SATURATION: 100 % | SYSTOLIC BLOOD PRESSURE: 175 MMHG | DIASTOLIC BLOOD PRESSURE: 114 MMHG | RESPIRATION RATE: 16 BRPM

## 2023-06-16 VITALS
OXYGEN SATURATION: 100 % | DIASTOLIC BLOOD PRESSURE: 147 MMHG | RESPIRATION RATE: 16 BRPM | TEMPERATURE: 99 F | HEART RATE: 94 BPM | SYSTOLIC BLOOD PRESSURE: 202 MMHG

## 2023-06-16 LAB
ALBUMIN SERPL ELPH-MCNC: 3.7 G/DL — SIGNIFICANT CHANGE UP (ref 3.3–5)
ALP SERPL-CCNC: 78 U/L — SIGNIFICANT CHANGE UP (ref 40–120)
ALT FLD-CCNC: 10 U/L — SIGNIFICANT CHANGE UP (ref 4–33)
ANION GAP SERPL CALC-SCNC: 11 MMOL/L — SIGNIFICANT CHANGE UP (ref 7–14)
APPEARANCE UR: CLEAR — SIGNIFICANT CHANGE UP
AST SERPL-CCNC: 12 U/L — SIGNIFICANT CHANGE UP (ref 4–32)
BACTERIA # UR AUTO: ABNORMAL
BASE EXCESS BLDV CALC-SCNC: -2.5 MMOL/L — LOW (ref -2–3)
BASOPHILS # BLD AUTO: 0.04 K/UL — SIGNIFICANT CHANGE UP (ref 0–0.2)
BASOPHILS NFR BLD AUTO: 0.4 % — SIGNIFICANT CHANGE UP (ref 0–2)
BILIRUB SERPL-MCNC: <0.2 MG/DL — SIGNIFICANT CHANGE UP (ref 0.2–1.2)
BILIRUB UR-MCNC: NEGATIVE — SIGNIFICANT CHANGE UP
BLOOD GAS VENOUS COMPREHENSIVE RESULT: SIGNIFICANT CHANGE UP
BUN SERPL-MCNC: 32 MG/DL — HIGH (ref 7–23)
CALCIUM SERPL-MCNC: 9 MG/DL — SIGNIFICANT CHANGE UP (ref 8.4–10.5)
CHLORIDE BLDV-SCNC: 106 MMOL/L — SIGNIFICANT CHANGE UP (ref 96–108)
CHLORIDE SERPL-SCNC: 106 MMOL/L — SIGNIFICANT CHANGE UP (ref 98–107)
CO2 BLDV-SCNC: 26.1 MMOL/L — HIGH (ref 22–26)
CO2 SERPL-SCNC: 23 MMOL/L — SIGNIFICANT CHANGE UP (ref 22–31)
COLOR SPEC: SIGNIFICANT CHANGE UP
CREAT SERPL-MCNC: 3.88 MG/DL — HIGH (ref 0.5–1.3)
DIFF PNL FLD: ABNORMAL
EGFR: 15 ML/MIN/1.73M2 — LOW
EOSINOPHIL # BLD AUTO: 0.25 K/UL — SIGNIFICANT CHANGE UP (ref 0–0.5)
EOSINOPHIL NFR BLD AUTO: 2.2 % — SIGNIFICANT CHANGE UP (ref 0–6)
EPI CELLS # UR: 4 /HPF — SIGNIFICANT CHANGE UP (ref 0–5)
GAS PNL BLDV: 137 MMOL/L — SIGNIFICANT CHANGE UP (ref 136–145)
GLUCOSE BLDV-MCNC: 87 MG/DL — SIGNIFICANT CHANGE UP (ref 70–99)
GLUCOSE SERPL-MCNC: 91 MG/DL — SIGNIFICANT CHANGE UP (ref 70–99)
GLUCOSE UR QL: ABNORMAL
HCG SERPL-ACNC: <1 MIU/ML — SIGNIFICANT CHANGE UP
HCO3 BLDV-SCNC: 24 MMOL/L — SIGNIFICANT CHANGE UP (ref 22–29)
HCT VFR BLD CALC: 35.6 % — SIGNIFICANT CHANGE UP (ref 34.5–45)
HCT VFR BLDA CALC: 35 % — SIGNIFICANT CHANGE UP (ref 34.5–46.5)
HGB BLD CALC-MCNC: 11.8 G/DL — SIGNIFICANT CHANGE UP (ref 11.7–16.1)
HGB BLD-MCNC: 11.3 G/DL — LOW (ref 11.5–15.5)
HYALINE CASTS # UR AUTO: 2 /LPF — SIGNIFICANT CHANGE UP (ref 0–7)
IANC: 8.34 K/UL — HIGH (ref 1.8–7.4)
IMM GRANULOCYTES NFR BLD AUTO: 0.8 % — SIGNIFICANT CHANGE UP (ref 0–0.9)
KETONES UR-MCNC: NEGATIVE — SIGNIFICANT CHANGE UP
LACTATE BLDV-MCNC: 0.5 MMOL/L — SIGNIFICANT CHANGE UP (ref 0.5–2)
LEUKOCYTE ESTERASE UR-ACNC: NEGATIVE — SIGNIFICANT CHANGE UP
LIDOCAIN IGE QN: 26 U/L — SIGNIFICANT CHANGE UP (ref 7–60)
LYMPHOCYTES # BLD AUTO: 18.4 % — SIGNIFICANT CHANGE UP (ref 13–44)
LYMPHOCYTES # BLD AUTO: 2.09 K/UL — SIGNIFICANT CHANGE UP (ref 1–3.3)
MCHC RBC-ENTMCNC: 24.6 PG — LOW (ref 27–34)
MCHC RBC-ENTMCNC: 31.7 GM/DL — LOW (ref 32–36)
MCV RBC AUTO: 77.6 FL — LOW (ref 80–100)
MONOCYTES # BLD AUTO: 0.56 K/UL — SIGNIFICANT CHANGE UP (ref 0–0.9)
MONOCYTES NFR BLD AUTO: 4.9 % — SIGNIFICANT CHANGE UP (ref 2–14)
NEUTROPHILS # BLD AUTO: 8.34 K/UL — HIGH (ref 1.8–7.4)
NEUTROPHILS NFR BLD AUTO: 73.3 % — SIGNIFICANT CHANGE UP (ref 43–77)
NITRITE UR-MCNC: NEGATIVE — SIGNIFICANT CHANGE UP
NRBC # BLD: 0 /100 WBCS — SIGNIFICANT CHANGE UP (ref 0–0)
NRBC # FLD: 0 K/UL — SIGNIFICANT CHANGE UP (ref 0–0)
PCO2 BLDV: 51 MMHG — SIGNIFICANT CHANGE UP (ref 39–52)
PH BLDV: 7.29 — LOW (ref 7.32–7.43)
PH UR: 6.5 — SIGNIFICANT CHANGE UP (ref 5–8)
PLATELET # BLD AUTO: 216 K/UL — SIGNIFICANT CHANGE UP (ref 150–400)
PO2 BLDV: 23 MMHG — LOW (ref 25–45)
POTASSIUM BLDV-SCNC: 4.1 MMOL/L — SIGNIFICANT CHANGE UP (ref 3.5–5.1)
POTASSIUM SERPL-MCNC: 4.3 MMOL/L — SIGNIFICANT CHANGE UP (ref 3.5–5.3)
POTASSIUM SERPL-SCNC: 4.3 MMOL/L — SIGNIFICANT CHANGE UP (ref 3.5–5.3)
PROT SERPL-MCNC: 7 G/DL — SIGNIFICANT CHANGE UP (ref 6–8.3)
PROT UR-MCNC: ABNORMAL
RBC # BLD: 4.59 M/UL — SIGNIFICANT CHANGE UP (ref 3.8–5.2)
RBC # FLD: 15.5 % — HIGH (ref 10.3–14.5)
RBC CASTS # UR COMP ASSIST: 7 /HPF — HIGH (ref 0–4)
SAO2 % BLDV: 26.2 % — LOW (ref 67–88)
SODIUM SERPL-SCNC: 140 MMOL/L — SIGNIFICANT CHANGE UP (ref 135–145)
SP GR SPEC: 1.01 — SIGNIFICANT CHANGE UP (ref 1.01–1.05)
UROBILINOGEN FLD QL: SIGNIFICANT CHANGE UP
WBC # BLD: 11.37 K/UL — HIGH (ref 3.8–10.5)
WBC # FLD AUTO: 11.37 K/UL — HIGH (ref 3.8–10.5)
WBC UR QL: 4 /HPF — SIGNIFICANT CHANGE UP (ref 0–5)

## 2023-06-16 PROCEDURE — 74176 CT ABD & PELVIS W/O CONTRAST: CPT | Mod: 26,MA

## 2023-06-16 PROCEDURE — 99285 EMERGENCY DEPT VISIT HI MDM: CPT

## 2023-06-16 PROCEDURE — 93010 ELECTROCARDIOGRAM REPORT: CPT

## 2023-06-16 RX ORDER — ACETAMINOPHEN 500 MG
975 TABLET ORAL ONCE
Refills: 0 | Status: COMPLETED | OUTPATIENT
Start: 2023-06-16 | End: 2023-06-16

## 2023-06-16 RX ORDER — SODIUM CHLORIDE 9 MG/ML
1000 INJECTION INTRAMUSCULAR; INTRAVENOUS; SUBCUTANEOUS ONCE
Refills: 0 | Status: COMPLETED | OUTPATIENT
Start: 2023-06-16 | End: 2023-06-16

## 2023-06-16 RX ORDER — MORPHINE SULFATE 50 MG/1
4 CAPSULE, EXTENDED RELEASE ORAL ONCE
Refills: 0 | Status: DISCONTINUED | OUTPATIENT
Start: 2023-06-16 | End: 2023-06-16

## 2023-06-16 RX ADMIN — SODIUM CHLORIDE 1000 MILLILITER(S): 9 INJECTION INTRAMUSCULAR; INTRAVENOUS; SUBCUTANEOUS at 22:38

## 2023-06-16 RX ADMIN — MORPHINE SULFATE 4 MILLIGRAM(S): 50 CAPSULE, EXTENDED RELEASE ORAL at 20:20

## 2023-06-16 RX ADMIN — SODIUM CHLORIDE 1000 MILLILITER(S): 9 INJECTION INTRAMUSCULAR; INTRAVENOUS; SUBCUTANEOUS at 20:21

## 2023-06-16 RX ADMIN — Medication 975 MILLIGRAM(S): at 20:21

## 2023-06-16 NOTE — ED PROVIDER NOTE - OBJECTIVE STATEMENT
Patient is a 33-year-old female with a history of FSGS who presents with abdominal pain since Wednesday.  Patient has had constant pain that waxes and wanes in severity.  Pain is periumbilical but sometimes radiates to the right or left side.  She denies any dysuria or hematuria but does have suprapubic pressure when urinating.  She has had 2 small bowel movements that were soft and nonbloody today.  She endorses nausea and anorexia with 1 episode of nonbloody nonbilious emesis today.  She was in [place] recently on vacation and returned on Monday, 2 days prior to start of symptoms.  She had a heavy episode of drinking on Saturday, 5 days prior to symptom onset.  She denies any other drug use.  She denies any history of abdominal surgery. no fevers

## 2023-06-16 NOTE — ED ADULT NURSE NOTE - NSFALLUNIVINTERV_ED_ALL_ED
Bed/Stretcher in lowest position, wheels locked, appropriate side rails in place/Call bell, personal items and telephone in reach/Instruct patient to call for assistance before getting out of bed/chair/stretcher/Non-slip footwear applied when patient is off stretcher/[place] to call system/Physically safe environment - no spills, clutter or unnecessary equipment/Purposeful proactive rounding/Room/bathroom lighting operational, light cord in reach

## 2023-06-16 NOTE — ED PROVIDER NOTE - CLINICAL SUMMARY MEDICAL DECISION MAKING FREE TEXT BOX
Shai - Patient is a 33-year-old female with a history of FSGS who presents with abdominal pain since Wednesday. ddx includes but is not limited to appy vs diverticulitis vs pancreatitis vs colitis vs UTI. will check labs, urine, CT abd. Tylenol and morphine for pain control

## 2023-06-16 NOTE — ED ADULT TRIAGE NOTE - CHIEF COMPLAINT QUOTE
periumbilical pain since wed. continuous. vomited x 1 today,  soft  stool x 1 today ,yesterday x1. denies problems urinating did not take bp meds today. last took bp meds-" I think tuesday"

## 2023-06-16 NOTE — ED ADULT NURSE NOTE - NSFALLRISKASMT_ED_ALL_ED_DT
Admitted for LUE DVT. POD 4 s/p fistulogram and evacuation Of LUE hematoma. A&Ox4. VSS on RA. Denies pain; declined scheduled Tylenol. CMS intact. Independent in room. Tolerating renal diet, but poor appetite. Only ate bites of food today. Continent. LUE fistula WNL. L chest dialysis catheter CDI. Dialysis MWF. Hep gtt infusing at 900 units/hr, next recheck just collected and result pending.  INR 1.65. Discussed and reviewed warfarin therapy this shift. Discharge pending therapeutic INR.            16-Jun-2023 20:26

## 2023-06-16 NOTE — ED PROVIDER NOTE - PROGRESS NOTE DETAILS
CT shows mesenteric panniculitis. outpt GI f/u.  Patient's creatinine elevated to 3.8, patient does not know her baseline, but would like to follow-up outpatient with her nephrologist.  We had shared decision-making about the risks and benefits of this, patient has decision-making capacity and understands that her kidneys might worsen and she may need dialysis if her kidney function worsens.  She would still like to go home and follow-up outpatient.  Patient was re-evaluated and doing well. Results, including any incidental findings, were discussed. Return precautions and follow up were discussed. Patient verbalized understanding. Blood pressure elevated to 207/140, however patient notes that she has not taken her blood pressure medication in the last 3 days because she was afraid that it might make her abdominal pain worse.  She states that she will start taking the blood pressure medications again

## 2023-06-16 NOTE — ED PROVIDER NOTE - NSFOLLOWUPINSTRUCTIONS_ED_ALL_ED_FT
Your blood work showed an increased creatinine.  This may be a sign of worsening FSGS and you need to be evaluated by your nephrologist within the next 2 to 3 days.  If you are unable to follow-up with your nephrologist or compare your creatinine from today to your most recent creatinine, please come back to the emergency department as an elevated creatinine could be a concern for kidney failure or worsening kidney disease.    Your CT showed mesenteric panniculitis. This may be the cause of your abdominal pain. Please follow up with a GI doctor in 2-3 days for further management.    In the meantime you can also take MiraLAX or magnesium citrate as per the over-the-counter bottles to help with your constipation.  You can also take Tylenol as per the over-the-counter bottle to help with your pain.    Your urine had some blood in it.  Please follow-up with your nephrologist about this for further management.    Please follow up with your nephrologist or primary care doctor in 2-3 days. Return to the ED if you experience any worsening or new symptoms or any symptoms that concern you, including fevers, chills, shortness of breath, chest pain, abdominal pain.

## 2023-06-16 NOTE — ED ADULT NURSE NOTE - NS ED NURSE RECORD ANOTHER VITAL SIGN
Patient is due for appt, please ask patient to schedule then we can ask provider for refill. Thanks to non clinical.    Yes

## 2023-06-16 NOTE — ED PROVIDER NOTE - PRINCIPAL DIAGNOSIS
Summary: COPD exacerbation     DATE & TIME: 10/11/21  Cognitive Concerns/ Orientation : A&Ox4  BEHAVIOR & AGGRESSION TOOL COLOR: green  CIWA SCORE:N/A  ABNL VS/O2: VSS, 94% on 2L NC  MOBILITY: Standby assist, steady on feet, minimal assist with O2 to get to bathroom  PAIN MANAGMENT: denies  DIET: regular  BOWEL/BLADDER. SBA to bathroom  ABNL LAB/BG: Pc02 56, covid negative  DRAIN/DEVICES: PIV right arm  TELEMETRY RHYTHM: none  SKIN: WDL  TESTS/PROCEDURES:none  D/C DAY/GOALS/PLACE: pending improvement of shortness of breath  OTHER IMPORTANT INFO: Patient lives in assisted living     Abdominal pain

## 2023-06-16 NOTE — ED ADULT NURSE NOTE - OBJECTIVE STATEMENT
Received pt in room 10. pt A&OX3. Pt c/o periumbilical pain beginning 3 days ago worsening. also c/o n/v x1 today and soft stools. pt hypertensive as noted, states has hx of HTN but has not taken BP meds in a few days. denies any headache, dizziness, blurry vision, no chest pain, sob, cough, fever/chills, no urinary symptoms. pt appears to be in no distress. respirations are equal and nonlabored, no respiratory distress noted. 20 gauge iv placed in the right ac, labs sent. pt medicated as per orders. stable, awaiting further plan.

## 2023-06-16 NOTE — ED PROVIDER NOTE - PHYSICAL EXAMINATION
Mariela Gibbons MD  GENERAL: Patient awake alert NAD.  HEENT: NC/AT, Moist mucous membranes, EOMI.  LUNGS: CTAB, no wheezes or crackles.   CARDIAC: RRR, no m/r/g.    ABDOMEN: Soft, +tender RLQ and LUQ, ND, No rebound, guarding. No CVA tenderness.   EXT: No edema. No calf tenderness.   MSK: No deformities.  NEURO: A&Ox3. Moving all extremities.  SKIN: Warm and dry. No rash.  PSYCH: Normal affect.

## 2023-06-16 NOTE — ED PROVIDER NOTE - NSFOLLOWUPCLINICS_GEN_ALL_ED_FT
[facility] Gastroenterology  Gastroenterology  [address] [place] [address] [address]  [place] [address]  Phone: [phone]  Fax:

## 2023-06-16 NOTE — ED PROVIDER NOTE - CHIEF COMPLAINT
The patient and her  present for IVF/PGT counseling.    Genetic carrier screening pending.    Daniel Sofia and and her  were advised about the process of invitro fertilization (IVF). She was specifically advised about the use of cetrotide and gonadotropins and the associated side effects with each. Those side effects include the potential for breast tenderness and breast pain, abdominal bloating and sharp abdominal pain, nausea, headaches, joint and muscle aches, insomnia, mood irritability and changes in vaginal discharge. Further advised that these side effects may make it more difficult for her to function in her work, social family and relationship settings. The patient was advised about the complication of ovarian hyperstimulation syndrome (OHSS) and that, while it is a rare complication directly related to the medications she is using (<1% likelihood), it may result in severe nausea, vomiting, abdominal pain and shortness of breath, need for prolonged hospitalization and intensive care management, surgical removal of one or both ovaries, prolonged disability as a result of stroke or heart attack and death. The patient has been made aware that it is of paramount importance that she follows the instructions of her physician and care team and even in those circumstances OHSS may still occur. We next reviewed the process of monitored anesthetic care and ultrasound guided egg retrieval. The risks associated with each included pneumonia and heart problems that may result in prolonged hospitalization and death due to the anesthesia process and the risk of internal bleeding, external bleeding, putting a hole through the intestines, bladder or major arteries and veins because of the egg retrieval that leads to prolonged hospitalization and major surgery.   I also reviewed the process of intracytoplasmic sperm injection versus conventional insemination and assisted hatching.  Patient and partner  advised about our mandatory single embryo transfer policy for PGT cases.  Patient advised about the lack of guarantee for quality of eggs retrieved, number of eggs that will fertilize, numbers of embryos that will develop, and number of embryos that would be normal and usable.   All patients undergoing IVF are advised that it remains unclear if there are truly increased risks to mothers and babies as a result of the IVF process. Specifically, there may be very small increased risks for babies to have birth defects and grow smaller and that the mother be at great risks for delivering prematurely, developing high blood pressure, have bleeding or other problems during pregnancy. Many patients undergoing IVF will create additional embryos that remain frozen for a period of months or years. All available scientific evidence  Does not show an increase in birth defects and small growth for children results from frozen embryos and that there is little likelihood for the occurrence of maternal problems as well.  The patient was advised that if there are frozen embryos she and, if applicable, her partner must create documents that discuss the disposition of those embryos in the event of death, disability, separation or divorce of one or both parties. Additionally, the patient is responsible for any storage fees for keeping those embryos at our facility or other facilities.     Fact Sheet for Patients Considering or Planning the Use of Pre-Implantation Genetic Testing (PGT) or Pre- Implantation Genetic Diagnosis (PGD)    Background:    Pre-implantation Genetic Testing (PGT), also referred to Pre-Implantation Genetic Screening (PGS), is  a process where your embryo will be evaluated for chromosome abnormalities.  Humans ideally have 23 pairs of chromosomes, called a karyotype, with women having a 46 XX karyotype and men with a 46 XY karyotype.  Each chromosome contains DNA, which is distributed in certain patterns that allow  for human development.     Pre- Implantation Genetic Diagnosis (PGD) is the process where your embryo will be evaluated for abnormal changes (or mutations) to the DNA.  PGD may be requested due to your risks of passing down a DNA based problem to a child.  In cases were an embryo is undergoing PGD it also undergoes PGT/PGS evaluation.    When embryos undergo PGT/PGS/PGD only a few cells are analyzed (between 5-10) cells.  These cells are taken from the trophectoderm which ultimately becomes the placenta. The cells that make up the inner cell mass, which go on to form the baby are not tested.  Numerous studies have demonstrated that the trophectoderm testing reliably provides a prognosis for your baby with a high degree (>90%), but not 100%, certainty.    Indications for PGT/PGS/and PGD:  Your provider may have discussed or recommended PGT/PGS for the following reasons:  1. Your maternal age may place you at higher risks for delivering a child with abnormalities of their chromosome number, exact pairing or missing pieces of those chromosomes.    2. Your history of repeated failure of delivering healthy pregnancy despite numerous attempts at fertility treatments, including the transfers of apparently normal appearing embryos  3. You may have a personal or family history of an individual with chromosome abnormalities and you would like to minimize the likelihood of starting your family in this manner  4. Your personal preference to get as much information about your embryos as possible prior to embryo transfer  Your provider may have discussed or recommended PGD for the following reasons  Indications for PGD:  1. Your history of having had a child with a specific medical problem related to abnormal DNA or after you have received prior genetic counseling that you are at risks for having a child with a specific DNA based problem. For example, PGD may be recommended if you and your partner are both identified to be  The patient is a 33y Female complaining of  carriers of the genetic condition, cystic fibrosis.     How your PGT/PGS results will be reported:  1. The total number of embryos that could be successfully tested  a. Not all embryos can be tested and in some cases there is no result that can be reported  b. The total number of embryos that are normal or euploid, and if  PGD is performed, are not affected by a DNA based disease  c. These are embryos that have the normal 23 pairs of chromosomes  2. The number of embryos that are mosaic.  a. Mosaicism describes and embryo in which some cells are found to have a normal set of chromosomes, and some cells are found to have an abnormal set of chromosomes.    b. Mosaic embryos will be characterized as:  i. 20-40% mosaic (“low mosaic”) or said another way 60-80% of the cells tested are normal.  The consensus among most centers doing this testing is that these embryos may have a lower chance for implanting, a potentially higher likelihood for miscarriage and potentially a lower chance of live birth than does a 100% normal embryo.  There is still no long term information about the health of children born from the transfer of mosaic embryos.  Thus far the short term follow up of these children has not identified health concerns.  Additionally, it is felt that these embryos should be used, with the consent of the patient(s) if no normal embryos are available.  ii. >40-80% mosaic (“high mosaic”) or 19-60% normal.  There is a concern that these embryos have a much lower likelihood of implanting and leading to a pregnancy, much higher rate of miscarriage and a much greater potential for a child with significant health concerns.  Our center may agree to the transfer of these embryos if there are no normal embryos available, the patient’s wishes and based on the preference of your treating physician.  iii. 100% abnormal.  Our center is very concerned about the very high potential that these embryos will result in a failed  transfer, an early pregnancy loss or a child with significant health concerns.  Our center will not transfer these embryos under any circumstances.    Additional important information:  Our center, after consultation with you and, if applicable, your family members feel that the tranfser of embryos that are “low mosaic” into your uterus is still safe and appropriate.  While these embryos are not 100% normal, thus far ongoing research has shown that the resulting babies have not been affected by health concerns.  However, long term follow up studies are still needed to determine the long term health of these children born following these technologies.  Some studies have demonstrated that the likelihood of these embryos implanting, or “sticking” may be somewhat lower and that the miscarriage rate might be higher.  Your physician may advise you not to use these low mosaic embryos if the abnormalities are in certain chromosomes pairs (2, 7, 13, 14, 15, 16, 18, 21) which may have a much higher likelihood of leading to a child with significant health problems.  Our center also agrees that embryos that are characterized as having “high mosaic” findings have a much lower implantation rate, much higher miscarriage rate and a much greater concern for potential health problems affecting children in the short and long term.  Despite the amazing developments in technology, our center and others nationally and worldwide, wish to emphasize that this technology does not guarantee that you will become pregnant or deliver a child.  Furthermore, in the event of a live birth this technology cannot guarantee that your child will not be discovered to have birth defects, other medical diagnoses or even the diagnosis you were trying to prevent subsequently.  There remains ongoing debate as to the necessity of this testing, in particular for women < 34 years, undergoing in vitro fertilization who do not have a specific indication for  PGT/PGS/PGD and whether their likelihood for live birth will be increased.

## 2023-06-16 NOTE — ED PROVIDER NOTE - ATTENDING CONTRIBUTION TO CARE
34 yo female with PMH  FSGS for evaluation of abd pain PE: Well appearing, RRR, CTA BL lungs, abd soft + tender to palpation upper abd and right lower quad A/P Labs, imaging, medicate, reassess

## 2023-06-16 NOTE — ED PROVIDER NOTE - PATIENT PORTAL LINK FT
You can access the FollowMyHealth Patient Portal offered by [facility] by registering at the following website: [email or web address]. By joining [employer]’s FollowMyHealth portal, you will also be able to view your health information using other applications (apps) compatible with our system.

## 2023-06-18 LAB
CULTURE RESULTS: SIGNIFICANT CHANGE UP
SPECIMEN SOURCE: SIGNIFICANT CHANGE UP

## 2023-07-03 NOTE — H&P ADULT - CARDIOVASCULAR SYMPTOMS
Patient is a 80 yo male self referred for a second opinion for above complaints. He c/o chronic constipation managed with MoM with good results. He has history of GIB (? PUD) on PPI therapy. He has recently moved to the area. He also has chronic prostatitis and was admitted for a UTI at [facility] 2 weeks ago. He had fever and  E.Coli UTI. He is  on Invanz IV x 4 weeks via PICC line. He c/o diarrhea x 6 weeks. This started after a trial of Linzess. He has no noctural symptoms and diarrhea is without any blood. He denies fever, chills, abdominal pain or weight loss. He has not tried cutting down on MoM. No sick contacts or recent travel.  Follow Up 1/22/21: Patient patients for routine follow up. He has completed treatment for postattis and UTI with 1 month of Invanz. He had a cystoscopy and is following up with Urology. Stool studies were normal in 10/20. Linzess caused severe diarrhea. He tried Florastor with no improvement. He also has not responded well to MoM. No straining or rectal bleeding. He also c/o recurrence of heartburn. He uses Prilosec intermittently. No dysphagia or odynophagia.  Follow Up 2/10/21: Patient presents for unexpected follow up. Amitiza 8 and 24 mcg BID dose did not help. He is now taking 2 TSP of MoM mixed with an opened capsule of Linzess 145 mcg. No new alarm symptoms.  Follow Up 5/10/21: Patient presents for routine follow up. He has started OTC Probiotics (Physicians choice). He is taking 15 cc of MoM along with an open capsule of Linzess 145 (half dose). He is dealing with urinary retention and frequent UTI's. He is going for interstim testing tomorrow by Urology.  Follow Up 10/26/21: Pt presents for routine follow up. He followed up with urology and did trial of interstim device, which did not help. He continues to take MoM and Linzess daily as previously mentioned above. He no longer takes probiotics. He mostly c/o gas, worse in past 7 days. He is worried about EPI after seeing a commercial, but denies any  diarrhea or greasy stools. He admits to eating fatty and greasy foods.  Follow Up 2/14/22: Patient presents for routine follow up. He is using Linzess by opening up the capusle picking drug with a toothpick and mixing with MoM. No new complaints.  Follow Up 5/23/22: Patient presents for routine follow up. He has started taking OTC Digestive enzymes ultra 1-2 caps daily. He is taking Gas-X for bloating. He is having urinary flow issues. He is awaiting appointment with Rl SIMMONS for PFT.  Follow Up 9/14/22: Patient presents for routine follow up. He continues to use MoM and Linzess with variable success. He c/o gas/bloating. He went to PFT but no sure about his compliance. He reports being stressed since moving to [place] compounded by falling stock market.  Follow Up 3/20/23: Patient presents for routine follow up. He woke up day after thanksgiving and noticed bright red bleeding per rectum. He presented to [facility] ED and was admitted. Colonoscopy by Dr. Mackay was negative for acute bleeding but tics noted. He was discharged and represented next week for similar symptoms. A repeat colonoscopy by myself on 12/2/23 revealed extensive diverticulosis and a bleeding diverticulum s/p clipping x 2 with control of hemorrhage. He stopped taking ASA 81 and Aleve. He has not bled since then.  Follow Up 7/3/23: Patient presents for telehealth visit. He c/o left sided abdominal/groin pain x few weeks. He saw PCP and pain was not reproducible. He was given 5 additonal days of Cipro with no clear resolution. He has known diverticulosis seen on CT in Nov 2022. No fever, chills, diarrhea or rectal bleeding. Pain is interfering with his QoL. chest pain

## 2023-11-01 ENCOUNTER — APPOINTMENT (OUTPATIENT)
Dept: TRANSPLANT | Facility: CLINIC | Age: 33
End: 2023-11-01

## 2023-11-01 ENCOUNTER — APPOINTMENT (OUTPATIENT)
Dept: NEPHROLOGY | Facility: CLINIC | Age: 33
End: 2023-11-01
Payer: COMMERCIAL

## 2023-11-01 ENCOUNTER — NON-APPOINTMENT (OUTPATIENT)
Age: 33
End: 2023-11-01

## 2023-11-01 ENCOUNTER — APPOINTMENT (OUTPATIENT)
Dept: TRANSPLANT | Facility: CLINIC | Age: 33
End: 2023-11-01
Payer: COMMERCIAL

## 2023-11-01 VITALS
HEIGHT: 68 IN | SYSTOLIC BLOOD PRESSURE: 139 MMHG | TEMPERATURE: 97.7 F | WEIGHT: 262 LBS | OXYGEN SATURATION: 100 % | HEART RATE: 81 BPM | DIASTOLIC BLOOD PRESSURE: 90 MMHG | BODY MASS INDEX: 39.71 KG/M2

## 2023-11-01 DIAGNOSIS — E78.49 OTHER HYPERLIPIDEMIA: ICD-10-CM

## 2023-11-01 DIAGNOSIS — N18.9 CHRONIC KIDNEY DISEASE, UNSPECIFIED: ICD-10-CM

## 2023-11-01 PROCEDURE — 99205 OFFICE O/P NEW HI 60 MIN: CPT

## 2023-11-01 RX ORDER — LOSARTAN POTASSIUM 25 MG/1
25 TABLET, FILM COATED ORAL
Qty: 30 | Refills: 0 | Status: COMPLETED | COMMUNITY
Start: 2017-11-15 | End: 2023-11-01

## 2023-11-01 RX ORDER — ERGOCALCIFEROL 1.25 MG/1
1.25 MG CAPSULE, LIQUID FILLED ORAL
Qty: 3 | Refills: 0 | Status: COMPLETED | COMMUNITY
Start: 2017-09-18 | End: 2023-11-01

## 2023-11-01 RX ORDER — NORGESTIMATE AND ETHINYL ESTRADIOL 7DAYSX3 LO
0.18/0.215/0.25 KIT ORAL DAILY
Qty: 90 | Refills: 3 | Status: COMPLETED | COMMUNITY
Start: 2017-04-26 | End: 2023-11-01

## 2023-11-01 RX ORDER — LOSARTAN POTASSIUM 100 MG/1
100 TABLET, FILM COATED ORAL
Refills: 0 | Status: COMPLETED | COMMUNITY
End: 2023-11-01

## 2023-11-01 RX ORDER — NYSTATIN AND TRIAMCINOLONE ACETONIDE 100000; 1 [USP'U]/G; MG/G
100000-0.1 OINTMENT TOPICAL TWICE DAILY
Qty: 1 | Refills: 0 | Status: COMPLETED | COMMUNITY
Start: 2017-10-06 | End: 2023-11-01

## 2023-11-01 RX ORDER — FLUCONAZOLE 150 MG/1
150 TABLET ORAL
Qty: 1 | Refills: 1 | Status: COMPLETED | COMMUNITY
Start: 2017-10-10 | End: 2023-11-01

## 2023-11-01 RX ORDER — FLUTICASONE PROPIONATE 50 MCG
SPRAY, SUSPENSION NASAL
Refills: 0 | Status: COMPLETED | COMMUNITY
End: 2023-11-01

## 2023-11-01 RX ORDER — TERCONAZOLE 8 MG/G
0.8 CREAM VAGINAL
Qty: 1 | Refills: 1 | Status: COMPLETED | COMMUNITY
Start: 2017-10-10 | End: 2023-11-01

## 2023-11-02 LAB
ABO + RH PNL BLD: NORMAL
ABO + RH PNL BLD: NORMAL
ALBUMIN SERPL ELPH-MCNC: 4.1 G/DL
ALP BLD-CCNC: 87 U/L
ALT SERPL-CCNC: 9 U/L
ANION GAP SERPL CALC-SCNC: 13 MMOL/L
APPEARANCE: CLEAR
AST SERPL-CCNC: 12 U/L
BACTERIA: ABNORMAL /HPF
BASOPHILS # BLD AUTO: 0.05 K/UL
BASOPHILS NFR BLD AUTO: 0.5 %
BILIRUB SERPL-MCNC: 0.2 MG/DL
BILIRUBIN URINE: NEGATIVE
BLOOD URINE: NEGATIVE
BUN SERPL-MCNC: 37 MG/DL
CALCIUM SERPL-MCNC: 9.4 MG/DL
CAST: 2 /LPF
CHLORIDE SERPL-SCNC: 105 MMOL/L
CHOLEST SERPL-MCNC: 189 MG/DL
CMV IGG SERPL QL: <0.2 U/ML
CMV IGG SERPL-IMP: NEGATIVE
CO2 SERPL-SCNC: 21 MMOL/L
COLOR: YELLOW
COVID-19 NUCLEOCAPSID  GAM ANTIBODY INTERPRETATION: POSITIVE
COVID-19 SPIKE DOMAIN ANTIBODY INTERPRETATION: POSITIVE
CREAT SERPL-MCNC: 4.32 MG/DL
CREAT SPEC-SCNC: 113 MG/DL
CREAT/PROT UR: 5.5 RATIO
EBV EA AB SER IA-ACNC: <5 U/ML
EBV EA AB TITR SER IF: NEGATIVE
EBV EA IGG SER QL IA: <3 U/ML
EBV EA IGG SER-ACNC: NEGATIVE
EBV EA IGM SER IA-ACNC: NEGATIVE
EBV PATRN SPEC IB-IMP: NORMAL
EBV VCA IGG SER IA-ACNC: <10 U/ML
EBV VCA IGM SER QL IA: <10 U/ML
EGFR: 13 ML/MIN/1.73M2
EOSINOPHIL # BLD AUTO: 0.41 K/UL
EOSINOPHIL NFR BLD AUTO: 3.9 %
EPITHELIAL CELLS: 2 /HPF
EPSTEIN-BARR VIRUS CAPSID ANTIGEN IGG: NEGATIVE
ESTIMATED AVERAGE GLUCOSE: 114 MG/DL
GLUCOSE QUALITATIVE U: >=1000 MG/DL
GLUCOSE SERPL-MCNC: 81 MG/DL
HAV IGM SER QL: NONREACTIVE
HBA1C MFR BLD HPLC: 5.6 %
HBV CORE IGG+IGM SER QL: NONREACTIVE
HBV SURFACE AB SER QL: REACTIVE
HBV SURFACE AB SERPL IA-ACNC: 18.7 MIU/ML
HBV SURFACE AG SER QL: NONREACTIVE
HCG SERPL-MCNC: <1 MIU/ML
HCT VFR BLD CALC: 37 %
HCV AB SER QL: NONREACTIVE
HCV S/CO RATIO: 0.1 S/CO
HDLC SERPL-MCNC: 57 MG/DL
HEPATITIS A IGG ANTIBODY: REACTIVE
HGB BLD-MCNC: 10.9 G/DL
HIV1+2 AB SPEC QL IA.RAPID: NONREACTIVE
HSV 1+2 IGG SER IA-IMP: NEGATIVE
HSV 1+2 IGG SER IA-IMP: NEGATIVE
HSV1 IGG SER QL: 0.1 INDEX
HSV2 IGG SER QL: 0.18 INDEX
IMM GRANULOCYTES NFR BLD AUTO: 0.8 %
KETONES URINE: NEGATIVE MG/DL
LDLC SERPL CALC-MCNC: 103 MG/DL
LEUKOCYTE ESTERASE URINE: NEGATIVE
LYMPHOCYTES # BLD AUTO: 2.53 K/UL
LYMPHOCYTES NFR BLD AUTO: 23.8 %
MAGNESIUM SERPL-MCNC: 2.4 MG/DL
MAN DIFF?: NORMAL
MCHC RBC-ENTMCNC: 24.3 PG
MCHC RBC-ENTMCNC: 29.5 GM/DL
MCV RBC AUTO: 82.6 FL
MICROSCOPIC-UA: NORMAL
MONOCYTES # BLD AUTO: 0.53 K/UL
MONOCYTES NFR BLD AUTO: 5 %
NEUTROPHILS # BLD AUTO: 7.02 K/UL
NEUTROPHILS NFR BLD AUTO: 66 %
NITRITE URINE: NEGATIVE
NONHDLC SERPL-MCNC: 132 MG/DL
PH URINE: 6.5
PHOSPHATE SERPL-MCNC: 4.4 MG/DL
PLATELET # BLD AUTO: 365 K/UL
POTASSIUM SERPL-SCNC: 5.2 MMOL/L
PROT SERPL-MCNC: 7.2 G/DL
PROT UR-MCNC: 623 MG/DL
PROTEIN URINE: >=1000 MG/DL
RBC # BLD: 4.48 M/UL
RBC # FLD: 16 %
RED BLOOD CELLS URINE: 4 /HPF
ROGOSIN: NORMAL
RUBV IGG FLD-ACNC: 12.6 INDEX
RUBV IGG SER-IMP: POSITIVE
SARS-COV-2 AB SERPL IA-ACNC: >250 U/ML
SARS-COV-2 AB SERPL QL IA: 182 INDEX
SARS-COV-2 N GENE NPH QL NAA+PROBE: NOT DETECTED
SODIUM SERPL-SCNC: 139 MMOL/L
SPECIFIC GRAVITY URINE: 1.02
T GONDII AB SER-IMP: NEGATIVE
T GONDII IGG SER QL: <3 IU/ML
T PALLIDUM AB SER QL IA: NEGATIVE
TRIGL SERPL-MCNC: 168 MG/DL
UROBILINOGEN URINE: 0.2 MG/DL
VZV AB TITR SER: POSITIVE
VZV IGG SER IF-ACNC: 452.6 INDEX
WBC # FLD AUTO: 10.62 K/UL
WHITE BLOOD CELLS URINE: 5 /HPF

## 2023-11-07 LAB
M TB IFN-G BLD-IMP: NEGATIVE
QUANTIFERON TB PLUS MITOGEN MINUS NIL: 1.87 IU/ML
QUANTIFERON TB PLUS NIL: 0.02 IU/ML
QUANTIFERON TB PLUS TB1 MINUS NIL: 0 IU/ML
QUANTIFERON TB PLUS TB2 MINUS NIL: 0 IU/ML

## 2023-11-09 ENCOUNTER — NON-APPOINTMENT (OUTPATIENT)
Age: 33
End: 2023-11-09

## 2023-11-09 ENCOUNTER — APPOINTMENT (OUTPATIENT)
Dept: CARDIOLOGY | Facility: CLINIC | Age: 33
End: 2023-11-09
Payer: COMMERCIAL

## 2023-11-09 VITALS
BODY MASS INDEX: 39.38 KG/M2 | OXYGEN SATURATION: 96 % | SYSTOLIC BLOOD PRESSURE: 124 MMHG | HEART RATE: 79 BPM | DIASTOLIC BLOOD PRESSURE: 82 MMHG | WEIGHT: 259 LBS

## 2023-11-09 DIAGNOSIS — I10 ESSENTIAL (PRIMARY) HYPERTENSION: ICD-10-CM

## 2023-11-09 PROCEDURE — 93000 ELECTROCARDIOGRAM COMPLETE: CPT

## 2023-11-09 PROCEDURE — 99204 OFFICE O/P NEW MOD 45 MIN: CPT

## 2023-11-09 RX ORDER — ATORVASTATIN CALCIUM 20 MG/1
20 TABLET, FILM COATED ORAL
Refills: 0 | Status: ACTIVE | COMMUNITY

## 2023-11-09 RX ORDER — DAPAGLIFLOZIN 10 MG/1
10 TABLET, FILM COATED ORAL DAILY
Refills: 0 | Status: ACTIVE | COMMUNITY

## 2023-11-09 RX ORDER — AMLODIPINE BESYLATE 10 MG/1
10 TABLET ORAL DAILY
Refills: 0 | Status: ACTIVE | COMMUNITY

## 2023-11-09 RX ORDER — OLMESARTAN MEDOXOMIL 40 MG/1
40 TABLET, FILM COATED ORAL DAILY
Refills: 0 | Status: ACTIVE | COMMUNITY

## 2023-11-09 RX ORDER — ADHESIVE TAPE 3"X 2.3 YD
50 MCG TAPE, NON-MEDICATED TOPICAL DAILY
Refills: 0 | Status: ACTIVE | COMMUNITY

## 2023-12-14 ENCOUNTER — APPOINTMENT (OUTPATIENT)
Dept: CARDIOLOGY | Facility: CLINIC | Age: 33
End: 2023-12-14
Payer: COMMERCIAL

## 2023-12-14 PROCEDURE — 93306 TTE W/DOPPLER COMPLETE: CPT

## 2023-12-14 PROCEDURE — 93015 CV STRESS TEST SUPVJ I&R: CPT

## 2023-12-31 PROBLEM — Z20.2 POSSIBLE EXPOSURE TO STD: Status: ACTIVE | Noted: 2022-07-05

## 2024-01-02 ENCOUNTER — APPOINTMENT (OUTPATIENT)
Dept: OBGYN | Facility: CLINIC | Age: 34
End: 2024-01-02
Payer: COMMERCIAL

## 2024-01-02 VITALS
DIASTOLIC BLOOD PRESSURE: 91 MMHG | HEIGHT: 68 IN | WEIGHT: 270 LBS | BODY MASS INDEX: 40.92 KG/M2 | HEART RATE: 83 BPM | SYSTOLIC BLOOD PRESSURE: 141 MMHG

## 2024-01-02 DIAGNOSIS — N91.5 OLIGOMENORRHEA, UNSPECIFIED: ICD-10-CM

## 2024-01-02 DIAGNOSIS — Z80.0 FAMILY HISTORY OF MALIGNANT NEOPLASM OF DIGESTIVE ORGANS: ICD-10-CM

## 2024-01-02 DIAGNOSIS — Z87.42 PERSONAL HISTORY OF OTHER DISEASES OF THE FEMALE GENITAL TRACT: ICD-10-CM

## 2024-01-02 DIAGNOSIS — Z82.49 FAMILY HISTORY OF ISCHEMIC HEART DISEASE AND OTHER DISEASES OF THE CIRCULATORY SYSTEM: ICD-10-CM

## 2024-01-02 DIAGNOSIS — Z01.419 ENCOUNTER FOR GYNECOLOGICAL EXAMINATION (GENERAL) (ROUTINE) W/OUT ABNORMAL FINDINGS: ICD-10-CM

## 2024-01-02 PROCEDURE — 99395 PREV VISIT EST AGE 18-39: CPT

## 2024-01-02 NOTE — PHYSICAL EXAM
[Chaperone Present] : A chaperone was present in the examining room during all aspects of the physical examination [Appropriately responsive] : appropriately responsive [Alert] : alert [No Acute Distress] : no acute distress [Soft] : soft [No Lesions] : no lesions [Oriented x3] : oriented x3 [Examination Of The Breasts] : a normal appearance [No Discharge] : no discharge [No Masses] : no breast masses were palpable [Labia Majora] : normal [Labia Minora] : normal [No Bleeding] : There was no active vaginal bleeding [Normal] : normal [Uterine Adnexae] : non-palpable [Tenderness] : nontender

## 2024-01-02 NOTE — PLAN
[FreeTextEntry1] : Yearly pap completed Advised seeking mental health counseling due to hx of depression, and possible renal transplant in the future Reinforced healthy BMI and measures to promote weight loss- info in hand from [facility] RTO prn or annually

## 2024-01-02 NOTE — HISTORY OF PRESENT ILLNESS
[FreeTextEntry1] : This 33 year old   LMP 12/23/23 presents for annual. Reports menstrual cycles are monthly and regular, no partner  at present, last act of intercourse back in July, denies any vaginal irritation or discharge, voiding and stooling without change in pattern, will be placed on renal transplant list, continues care with Dr. Colvin, nephrologist, medical hx updated, surgical hx updated, family hx updated.  Did not yet take her HTN medication. [PapSmeardate] : July 2022 [TextBox_31] : neg pap

## 2024-01-03 ENCOUNTER — APPOINTMENT (OUTPATIENT)
Dept: RADIOLOGY | Facility: IMAGING CENTER | Age: 34
End: 2024-01-03
Payer: COMMERCIAL

## 2024-01-03 ENCOUNTER — OUTPATIENT (OUTPATIENT)
Dept: OUTPATIENT SERVICES | Facility: HOSPITAL | Age: 34
LOS: 1 days | End: 2024-01-03
Payer: COMMERCIAL

## 2024-01-03 DIAGNOSIS — Z01.818 ENCOUNTER FOR OTHER PREPROCEDURAL EXAMINATION: ICD-10-CM

## 2024-01-03 LAB
C TRACH RRNA SPEC QL NAA+PROBE: NOT DETECTED
HPV HIGH+LOW RISK DNA PNL CVX: NOT DETECTED
N GONORRHOEA RRNA SPEC QL NAA+PROBE: NOT DETECTED
SOURCE AMPLIFICATION: NORMAL

## 2024-01-03 PROCEDURE — 71046 X-RAY EXAM CHEST 2 VIEWS: CPT

## 2024-01-03 PROCEDURE — 71046 X-RAY EXAM CHEST 2 VIEWS: CPT | Mod: 26

## 2024-01-04 DIAGNOSIS — B96.89 ACUTE VAGINITIS: ICD-10-CM

## 2024-01-04 DIAGNOSIS — N76.0 ACUTE VAGINITIS: ICD-10-CM

## 2024-01-04 LAB
CANDIDA VAG CYTO: NOT DETECTED
CYTOLOGY CVX/VAG DOC THIN PREP: NORMAL
G VAGINALIS+PREV SP MTYP VAG QL MICRO: DETECTED
T VAGINALIS VAG QL WET PREP: NOT DETECTED

## 2024-01-04 RX ORDER — METRONIDAZOLE 7.5 MG/G
0.75 GEL VAGINAL
Qty: 1 | Refills: 0 | Status: ACTIVE | COMMUNITY
Start: 2024-01-04 | End: 1900-01-01

## 2024-01-05 ENCOUNTER — APPOINTMENT (OUTPATIENT)
Dept: RADIOLOGY | Facility: IMAGING CENTER | Age: 34
End: 2024-01-05

## 2024-01-11 ENCOUNTER — NON-APPOINTMENT (OUTPATIENT)
Age: 34
End: 2024-01-11

## 2024-02-27 ENCOUNTER — NON-APPOINTMENT (OUTPATIENT)
Age: 34
End: 2024-02-27

## 2024-03-12 ENCOUNTER — NON-APPOINTMENT (OUTPATIENT)
Age: 34
End: 2024-03-12

## 2024-03-21 ENCOUNTER — APPOINTMENT (OUTPATIENT)
Dept: TRANSPLANT | Facility: CLINIC | Age: 34
End: 2024-03-21

## 2024-04-19 ENCOUNTER — APPOINTMENT (OUTPATIENT)
Dept: TRANSPLANT | Facility: CLINIC | Age: 34
End: 2024-04-19

## 2024-05-26 DIAGNOSIS — Z01.818 ENCOUNTER FOR OTHER PREPROCEDURAL EXAMINATION: ICD-10-CM

## 2024-05-31 ENCOUNTER — APPOINTMENT (OUTPATIENT)
Dept: TRANSPLANT | Facility: CLINIC | Age: 34
End: 2024-05-31

## 2024-06-20 ENCOUNTER — APPOINTMENT (OUTPATIENT)
Dept: TRANSPLANT | Facility: CLINIC | Age: 34
End: 2024-06-20

## 2024-07-29 ENCOUNTER — APPOINTMENT (OUTPATIENT)
Dept: TRANSPLANT | Facility: CLINIC | Age: 34
End: 2024-07-29

## 2024-08-20 NOTE — ED ADULT NURSE NOTE - CADM POA PRESS ULCER
August 20, 2024      [facility] - Endoscopy  [address]  [facility] [address]  Phone: [phone]  Fax: [phone]       Patient: Diana Arriaga   YOB: 1971  Date of Visit: 08/20/2024    To Whom It May Concern:    Serene Arriaga  was at [facility] on 08/20/2024. She may return to work/school on 8/21/2024 with no restrictions. If you have any questions or concerns, or if I can be of further assistance, please do not hesitate to contact me.    Sincerely,    Candelaria Aldana PA-C        
No

## 2024-08-21 ENCOUNTER — APPOINTMENT (OUTPATIENT)
Dept: TRANSPLANT | Facility: CLINIC | Age: 34
End: 2024-08-21

## 2024-09-10 ENCOUNTER — NON-APPOINTMENT (OUTPATIENT)
Age: 34
End: 2024-09-10

## 2024-09-20 ENCOUNTER — NON-APPOINTMENT (OUTPATIENT)
Age: 34
End: 2024-09-20

## 2024-09-24 ENCOUNTER — NON-APPOINTMENT (OUTPATIENT)
Age: 34
End: 2024-09-24

## 2024-09-24 ENCOUNTER — APPOINTMENT (OUTPATIENT)
Dept: TRANSPLANT | Facility: CLINIC | Age: 34
End: 2024-09-24

## 2024-09-24 ENCOUNTER — APPOINTMENT (OUTPATIENT)
Dept: NEPHROLOGY | Facility: CLINIC | Age: 34
End: 2024-09-24
Payer: COMMERCIAL

## 2024-09-24 ENCOUNTER — LABORATORY RESULT (OUTPATIENT)
Age: 34
End: 2024-09-24

## 2024-09-24 VITALS
WEIGHT: 240 LBS | RESPIRATION RATE: 18 BRPM | DIASTOLIC BLOOD PRESSURE: 80 MMHG | BODY MASS INDEX: 36.37 KG/M2 | HEART RATE: 73 BPM | HEIGHT: 68 IN | SYSTOLIC BLOOD PRESSURE: 123 MMHG | TEMPERATURE: 97.9 F | OXYGEN SATURATION: 100 %

## 2024-09-24 LAB
C PEPTIDE SERPL-MCNC: 3.5 NG/ML
COVID-19 SPIKE DOMAIN ANTIBODY INTERPRETATION: POSITIVE
HCG SERPL-MCNC: <1 MIU/ML
SARS-COV-2 AB SERPL IA-ACNC: >250 U/ML
T3 SERPL-MCNC: 83 NG/DL
T4 FREE SERPL-MCNC: 1.2 NG/DL
TSH SERPL-ACNC: 1.13 UIU/ML

## 2024-09-24 PROCEDURE — 99205 OFFICE O/P NEW HI 60 MIN: CPT

## 2024-09-24 NOTE — HISTORY OF PRESENT ILLNESS
[TextBox_42] : 33 year old female with advanced CKD since 2015 presents for annual f/u for kidney transplant evaluation   Patient has known CKD kidney biopsy done in 2015 ( at [facility]) with FSGS , was on steroids for 2-3 years.  Follows with Dr. Luis Colvin  preemptive candidate.   MEDICAL Hx:HTN (age 26)  no known DM No History of CAD/PCI No known h/o kidney stone/ urinary obstruction/ ureter stents/hematuria. No Transfusions no h/o Pneumonia / UTI/ known h/o tuberculosis or hepatitis/active infections/open wounds. No known h/o active CAD/CVA/PVD/DVT/bleeding/falls/seizures/psych issues/neoplasia   SURGICAL Hx: None. No history of kidney/ bladder surgery.  Social h/o- lives with mother. works for [employer]. non smoker, no alchol or illicit drug use.  Family h/o- No family h/o kidney disease. Father has HTN, DM Mother- HTN   Funcitonal status- Independent for ADL. Able to walk five blocks, can climb stairs without difficulty.  Potential Live donors: Being explored  Allergies: Penicillin (Itching)  Medications: Does not take any Coumadin/eliquis/Plavix/Brilinta. Amlodipine 2.5 mg/d Atorvastatin 20 mg/d Vitamin D3 Olmesartan 40/d Farxiga 10 mg/d

## 2024-09-24 NOTE — PHYSICAL EXAM
[General Appearance - In No Acute Distress] : in no acute distress [General Appearance - Alert] : alert [Sclera] : the sclera and conjunctiva were normal [Outer Ear] : the ears and nose were normal in appearance [Jugular Venous Distention Increased] : there was no jugular-venous distention [Auscultation Breath Sounds / Voice Sounds] : lungs were clear to auscultation bilaterally [Heart Sounds Gallop] : no gallops [Heart Sounds Pericardial Friction Rub] : no pericardial rub [Edema] : there was no peripheral edema [Abdomen Soft] : soft [Abdomen Tenderness] : non-tender [Cervical Lymph Nodes Enlarged Posterior Bilaterally] : posterior cervical [Cervical Lymph Nodes Enlarged Anterior Bilaterally] : anterior cervical [Abnormal Walk] : normal gait [] : no rash [No Focal Deficits] : no focal deficits [Oriented To Time, Place, And Person] : oriented to person, place, and time [Impaired Insight] : insight and judgment were intact

## 2024-09-24 NOTE — ASSESSMENT
[FreeTextEntry1] : Status 1- living donor cleared  Follows with Dr. Luis Colvin Last Seen 11/2023 Listed 1/2024 Dialysis pre-emptive ABO O will check titer today PRA 0 % will repeat today  Most recent testing Cardiac - Dr. Garcia ECHO 12/2023- EF 64%, normal LV mass and mild concentric hypertrophy, normal atria.  Stress 12/2023- 8 METS, normal stress  Radiology Chest Xray 1/2024- No evidence of acute infiltrate or pleural effusion.  CT abd and pelvis 6/2023- Stranding of the mesenteric fat in the left midabdomen with associated prominent mesenteric lymph nodes which may be seen in the setting of mesenteric panniculitis.  Cancer Screening Mammo/ US N/A PAP 1/2024- negative Colonoscopy N/A

## 2024-09-25 LAB
ABO + RH PNL BLD: NORMAL
ALBUMIN SERPL ELPH-MCNC: 4.1 G/DL
ALP BLD-CCNC: 60 U/L
ALT SERPL-CCNC: 5 U/L
ANION GAP SERPL CALC-SCNC: 17 MMOL/L
APPEARANCE: ABNORMAL
AST SERPL-CCNC: 9 U/L
BILIRUB SERPL-MCNC: 0.2 MG/DL
BILIRUBIN URINE: NEGATIVE
BLOOD URINE: NEGATIVE
BUN SERPL-MCNC: 61 MG/DL
CALCIUM SERPL-MCNC: 9.1 MG/DL
CHLORIDE SERPL-SCNC: 104 MMOL/L
CHOLEST SERPL-MCNC: 138 MG/DL
CK SERPL-CCNC: 104 U/L
CMV IGG SERPL QL: <0.2 U/ML
CMV IGG SERPL-IMP: NEGATIVE
CO2 SERPL-SCNC: 21 MMOL/L
COLOR: YELLOW
CREAT SERPL-MCNC: 6.76 MG/DL
CREAT SPEC-SCNC: 142 MG/DL
CREAT/PROT UR: 3 RATIO
CRP SERPL-MCNC: <3 MG/L
EBV EA AB SER IA-ACNC: <5 U/ML
EBV EA AB TITR SER IF: NEGATIVE
EBV EA IGG SER QL IA: <3 U/ML
EBV EA IGG SER-ACNC: NEGATIVE
EBV EA IGM SER IA-ACNC: NEGATIVE
EBV PATRN SPEC IB-IMP: NORMAL
EBV VCA IGG SER IA-ACNC: <10 U/ML
EBV VCA IGM SER QL IA: <10 U/ML
EGFR: 8 ML/MIN/1.73M2
EPSTEIN-BARR VIRUS CAPSID ANTIGEN IGG: NEGATIVE
ERYTHROCYTE [SEDIMENTATION RATE] IN BLOOD BY WESTERGREN METHOD: 54 MM/HR
ESTIMATED AVERAGE GLUCOSE: 100 MG/DL
GLUCOSE QUALITATIVE U: 500 MG/DL
GLUCOSE SERPL-MCNC: 67 MG/DL
HAV IGM SER QL: NONREACTIVE
HBA1C MFR BLD HPLC: 5.1 %
HBV CORE IGG+IGM SER QL: NONREACTIVE
HBV SURFACE AB SER QL: REACTIVE
HBV SURFACE AB SERPL IA-ACNC: 19.8 MIU/ML
HBV SURFACE AG SER QL: NONREACTIVE
HCT VFR BLD CALC: 31.6 %
HCV AB SER QL: NONREACTIVE
HCV S/CO RATIO: 0.11 S/CO
HDLC SERPL-MCNC: 52 MG/DL
HEPATITIS A IGG ANTIBODY: REACTIVE
HGB BLD-MCNC: 9.7 G/DL
HIV1+2 AB SPEC QL IA.RAPID: NONREACTIVE
HSV 1+2 IGG SER IA-IMP: NEGATIVE
HSV1 IGG SER QL: 0.22 INDEX
HSV1 IGG SER QL: 0.22 INDEX
HSV2 IGG SER QL: 0.09 INDEX
KETONES URINE: NEGATIVE MG/DL
LDLC SERPL CALC-MCNC: 75 MG/DL
LEUKOCYTE ESTERASE URINE: NEGATIVE
MAGNESIUM SERPL-MCNC: 2.5 MG/DL
MCHC RBC-ENTMCNC: 25.2 PG
MCHC RBC-ENTMCNC: 30.7 GM/DL
MCV RBC AUTO: 82.1 FL
MEV IGG FLD QL IA: >300 AU/ML
MEV IGG+IGM SER-IMP: POSITIVE
NITRITE URINE: NEGATIVE
NONHDLC SERPL-MCNC: 86 MG/DL
PH URINE: 6
PHOSPHATE SERPL-MCNC: 5.2 MG/DL
PLATELET # BLD AUTO: 353 K/UL
POTASSIUM SERPL-SCNC: 5.4 MMOL/L
PROT SERPL-MCNC: 7 G/DL
PROT UR-MCNC: 426 MG/DL
PROTEIN URINE: 300 MG/DL
RBC # BLD: 3.85 M/UL
RBC # FLD: 15.2 %
RUBV IGG FLD-ACNC: 10.2 INDEX
RUBV IGG SER-IMP: POSITIVE
SODIUM SERPL-SCNC: 141 MMOL/L
SPECIFIC GRAVITY URINE: 1.02
T GONDII AB SER-IMP: NEGATIVE
T GONDII IGG SER QL: <3 IU/ML
T PALLIDUM AB SER QL IA: NEGATIVE
TRIGL SERPL-MCNC: 52 MG/DL
URATE SERPL-MCNC: 10.5 MG/DL
UROBILINOGEN URINE: 0.2 MG/DL
VZV AB TITR SER: POSITIVE
VZV IGG SER IF-ACNC: 2.8 S/CO
WBC # FLD AUTO: 8.92 K/UL

## 2024-09-26 LAB — T CRUZI AB SER-ACNC: 0.3 IV

## 2024-09-28 LAB
M TB IFN-G BLD-IMP: NEGATIVE
QUANTIFERON TB PLUS MITOGEN MINUS NIL: 4.17 IU/ML
QUANTIFERON TB PLUS NIL: 0.29 IU/ML
QUANTIFERON TB PLUS TB1 MINUS NIL: 0.08 IU/ML
QUANTIFERON TB PLUS TB2 MINUS NIL: 0 IU/ML

## 2024-10-03 DIAGNOSIS — Z01.818 ENCOUNTER FOR OTHER PREPROCEDURAL EXAMINATION: ICD-10-CM

## 2024-10-24 ENCOUNTER — LABORATORY RESULT (OUTPATIENT)
Age: 34
End: 2024-10-24

## 2024-10-24 ENCOUNTER — OUTPATIENT (OUTPATIENT)
Dept: OUTPATIENT SERVICES | Facility: HOSPITAL | Age: 34
LOS: 1 days | End: 2024-10-24
Payer: COMMERCIAL

## 2024-10-24 ENCOUNTER — APPOINTMENT (OUTPATIENT)
Dept: TRANSPLANT | Facility: CLINIC | Age: 34
End: 2024-10-24
Payer: COMMERCIAL

## 2024-10-24 VITALS
RESPIRATION RATE: 16 BRPM | TEMPERATURE: 98 F | HEIGHT: 68 IN | HEART RATE: 73 BPM | OXYGEN SATURATION: 98 % | DIASTOLIC BLOOD PRESSURE: 74 MMHG | WEIGHT: 233.91 LBS | SYSTOLIC BLOOD PRESSURE: 108 MMHG

## 2024-10-24 VITALS
HEIGHT: 68 IN | WEIGHT: 235 LBS | HEART RATE: 77 BPM | TEMPERATURE: 97.8 F | SYSTOLIC BLOOD PRESSURE: 117 MMHG | OXYGEN SATURATION: 100 % | DIASTOLIC BLOOD PRESSURE: 82 MMHG | BODY MASS INDEX: 35.61 KG/M2

## 2024-10-24 DIAGNOSIS — Z01.818 ENCOUNTER FOR OTHER PREPROCEDURAL EXAMINATION: ICD-10-CM

## 2024-10-24 DIAGNOSIS — N81.9 FEMALE GENITAL PROLAPSE, UNSPECIFIED: ICD-10-CM

## 2024-10-24 DIAGNOSIS — Z98.890 OTHER SPECIFIED POSTPROCEDURAL STATES: Chronic | ICD-10-CM

## 2024-10-24 DIAGNOSIS — N05.1 UNSPECIFIED NEPHRITIC SYNDROME WITH FOCAL AND SEGMENTAL GLOMERULAR LESIONS: ICD-10-CM

## 2024-10-24 DIAGNOSIS — G47.33 OBSTRUCTIVE SLEEP APNEA (ADULT) (PEDIATRIC): ICD-10-CM

## 2024-10-24 LAB
BLD GP AB SCN SERPL QL: NEGATIVE — SIGNIFICANT CHANGE UP
RH IG SCN BLD-IMP: POSITIVE — SIGNIFICANT CHANGE UP

## 2024-10-24 PROCEDURE — 86901 BLOOD TYPING SEROLOGIC RH(D): CPT

## 2024-10-24 PROCEDURE — G0463: CPT

## 2024-10-24 PROCEDURE — 99214 OFFICE O/P EST MOD 30 MIN: CPT

## 2024-10-24 PROCEDURE — 86900 BLOOD TYPING SEROLOGIC ABO: CPT

## 2024-10-24 PROCEDURE — 86850 RBC ANTIBODY SCREEN: CPT

## 2024-10-24 NOTE — H&P PST ADULT - NSICDXPASTMEDICALHX_GEN_ALL_CORE_FT
PAST MEDICAL HISTORY:  Back pain     CKD (chronic kidney disease)     FSGS (focal segmental glomerulosclerosis)     KEN (obstructive sleep apnea)

## 2024-10-24 NOTE — H&P PST ADULT - PROBLEM SELECTOR PLAN 1
Open living donor kidney transplant receipient to the right side possible left on 11/04/2024.  Pre op T/s sent.  patient is scheduled for pre op work up with Transplant center at 11 am today .  last dose Ozempic 10/21/24 & last dose Farxiga 10/31/24 Open living donor kidney transplant recipient to the right side possible left on 11/04/2024.  Pre op T/s sent.   Patient is scheduled for pre op work up with Transplant center at 11 am today .    Last dose Ozempic 10/21/24 & last dose Farxiga 10/31/24

## 2024-10-24 NOTE — H&P PST ADULT - ATTENDING COMMENTS
DIANA CANTU was seen and evaluated today.  As documented, DIANA CANTU is a 34y Female with end-stage renal disease.  A living donor organ has been made available to her, and she has agreed to proceed with renal transplantation. She has had no major illnesses or hospitalizations since her last clinic visit.    We discussed the risks and benefits of kidney transplantation in depth.  Surgical risks included but were not limited to bleeding, infection, graft thrombosis, delayed graft function, urine leak, and potential need for re operation postoperatively.  We also discussed the risks of postoperative immunosuppression including but not limited to increased risk of infection, cancer, weight gain, new onset or worsening of diabetes or hypertension on a temporary or permanent basis, water retention, back pain, constipation, diarrhea, dizziness, headache, joint pain, loss of appetite, nausea, stomach pain or upset, trouble sleeping, vomiting, and/or mental or mood changes were fully disclosed. DIANA CANTU understands these risks and is willing to proceed with kidney transplantation. We discussed the differences in quality of life and patient survival between remaining on dialysis versus receiving a kidney transplant.

## 2024-10-24 NOTE — H&P PST ADULT - NSICDXPROCEDURE_GEN_ALL_CORE_FT
PROCEDURES:  Transplant, kidney, living donor, open 24-Oct-2024 10:17:59  Олег Bush   PROCEDURES:  Transplant, kidney, living donor, open 24-Oct-2024 10:17:59 Open living donor kidney transplant receipient to the right side possible left on 11/04/2024. Олег Bush

## 2024-10-24 NOTE — H&P PST ADULT - HISTORY OF PRESENT ILLNESS
34 year old  RHD female  with advanced CKD since 10/ 2015 , FSIG diagnosed in 2015. Presents for scheduled  kidney transplant evaluation on 11/04/24.    . PMH: HTN, HLD, S/P avf PLACD 1 obesity   ?  Patient has known CKD kidney biopsy done in 2015 ( at [facility]) with FSGS , was on steroids for 2-3 years.  Follows with Dr. Luis Colvin  34 year old  RHD female  with advanced CKD since 10/ 2015 , FSIG diagnosed in 2016. Presents for scheduled Open living donor kidney transplant receipient to the right side possible left on 11/04/2024.     PMH: HTN, HLD, CKD -s/p right forearm AVF placed in 12/2024(not on Dialysis ),   obesity & KEN . Patient has known CKD kidney biopsy done in 2015 ( at [facility]) with FSGS , was on steroids for 2-3 years, Currently on follow up with Nephro..   34 year old  RHD female  with advanced CKD since 10/ 2015 , FSIG diagnosed in 2016 currently on Farxiga . Presents for scheduled Open living donor kidney transplant recipient to the right side possible left on 11/04/2024.     PMH: HTN, HLD, CKD -S/p right forearm AVF placed in 12/2024,   Obesity & KEN .   Patient has known CKD kidney biopsy done in 2015 ( at [facility]) with FSGS , was on steroids for 2-3 years, Currently on follow up with Nephro.

## 2024-10-24 NOTE — H&P PST ADULT - OTHER CARE PROVIDERS
Nephro Dr parnell [facility] ,  vascular Dr Chapman, Wilfredo    Cardiac eval Dr Asif Garcia

## 2024-10-24 NOTE — H&P PST ADULT - ASSESSMENT
CKD; Open living donor kidney transplant receipient to the right side possible left on 2024.  Activity:   physically active, walks 4 miles few days aweek, home chores   Energy Expenditure score (DASI SCORE METS): 8.2  Symptoms : denies chest pain, palpitations, dyspnea, dizziness or  BURKETT,   Dental: Patient denies Loose teeth/Denture.   CAPRINI SCORE    AGE RELATED RISK FACTORS                                                             [ ] Age 41-60 years                                            (1 Point)  [ ] Age: 61-74 years                                           (2 Points)                 [ ] Age= 75 years                                                (3 Points)             DISEASE RELATED RISK FACTORS                                                       [ ] Edema in the lower extremities                 (1 Point)                     [ ] Varicose veins                                               (1 Point)                                 [ x] BMI > 25 Kg/m2                                            (1 Point)                                  [ ] Serious infection (ie PNA)                            (1 Point)                     [ ] Lung disease ( COPD, Emphysema)            (1 Point)                                                                          [ ] Acute myocardial infarction                         (1 Point)                  [ ] Congestive heart failure (in the previous month)  (1 Point)         [ ] Inflammatory bowel disease                            (1 Point)                  [ ] Central venous access, PICC or Port               (2 points)       (within the last month)                                                                [ ] Stroke (in the previous month)                        (5 Points)    [ ] Previous or present malignancy                       (2 points)                                                                                                                                                         HEMATOLOGY RELATED FACTORS                                                         [ ] Prior episodes of VTE                                     (3 Points)                     [ ] Positive family history for VTE                      (3 Points)                  [ ] Prothrombin 51721 A                                     (3 Points)                     [ ] Factor V Leiden                                                (3 Points)                        [ ] Lupus anticoagulants                                      (3 Points)                                                           [ ] Anticardiolipin antibodies                              (3 Points)                                                       [ ] High homocysteine in the blood                   (3 Points)                                             [ ] Other congenital or acquired thrombophilia      (3 Points)                                                [ ] Heparin induced thrombocytopenia                  (3 Points)                                        MOBILITY RELATED FACTORS  [ ] Bed rest                                                         (1 Point)  [ ] Plaster cast                                                    (2 points)  [ ] Bed bound for more than 72 hours           (2 Points)    GENDER SPECIFIC FACTORS  [ ] Pregnancy or had a baby within the last month   (1 Point)  [ ] Post-partum < 6 weeks                                   (1 Point)  [ ] Hormonal therapy  or oral contraception   (1 Point)  [ ] History of pregnancy complications              (1 point)  [ ] Unexplained or recurrent              (1 Point)    OTHER RISK FACTORS                                           (1 Point)  [ x] BMI >40, smoking, diabetes requiring insulin, chemotherapy  blood transfusions and length of surgery over 2 hours    SURGERY RELATED RISK FACTORS  [ ]  Section within the last month     (1 Point)  [ ] Minor surgery                                                  (1 Point)  [ ] Arthroscopic surgery                                       (2 Points)  [ ] Planned major surgery lasting more            (2 Points)      than 45 minutes     [ ] Elective hip or knee joint replacement       (5 points)       surgery                                                TRAUMA RELATED RISK FACTORS  [ ] Fracture of the hip, pelvis, or leg                       (5 Points)  [ ] Spinal cord injury resulting in paralysis             (5 points)       (in the previous month)    [ ] Paralysis  (less than 1 month)                             (5 Points)  [ ] Multiple Trauma within 1 month                        (5 Points)    Total Score [  2      ]    Caprini Score 0-2: Low Risk, NO VTE prophylaxis required for most patients, encourage ambulation  Caprini Score 3-6: Moderate Risk , pharmacologic VTE prophylaxis is indicated for most patients (in the absence of contraindications)  Caprini Score Greater than or =7: High risk, pharmocologic VTE prophylaxis indicated for most patients (in the absence of contraindications)

## 2024-11-04 ENCOUNTER — INPATIENT (INPATIENT)
Facility: HOSPITAL | Age: 34
LOS: 3 days | Discharge: HOME | DRG: 652 | End: 2024-11-08
Attending: TRANSPLANT SURGERY | Admitting: TRANSPLANT SURGERY
Payer: COMMERCIAL

## 2024-11-04 ENCOUNTER — TRANSCRIPTION ENCOUNTER (OUTPATIENT)
Age: 34
End: 2024-11-04

## 2024-11-04 ENCOUNTER — APPOINTMENT (OUTPATIENT)
Dept: TRANSPLANT | Facility: HOSPITAL | Age: 34
End: 2024-11-04

## 2024-11-04 VITALS
WEIGHT: 233.91 LBS | OXYGEN SATURATION: 99 % | HEART RATE: 92 BPM | RESPIRATION RATE: 20 BRPM | HEIGHT: 67.99 IN | DIASTOLIC BLOOD PRESSURE: 78 MMHG | SYSTOLIC BLOOD PRESSURE: 127 MMHG | TEMPERATURE: 98 F

## 2024-11-04 DIAGNOSIS — Z98.890 OTHER SPECIFIED POSTPROCEDURAL STATES: Chronic | ICD-10-CM

## 2024-11-04 DIAGNOSIS — N81.9 FEMALE GENITAL PROLAPSE, UNSPECIFIED: ICD-10-CM

## 2024-11-04 LAB
ALBUMIN SERPL ELPH-MCNC: 3.7 G/DL — SIGNIFICANT CHANGE UP (ref 3.3–5)
ALBUMIN SERPL ELPH-MCNC: 3.7 G/DL — SIGNIFICANT CHANGE UP (ref 3.3–5)
ALBUMIN SERPL ELPH-MCNC: 3.9 G/DL — SIGNIFICANT CHANGE UP (ref 3.3–5)
ALP SERPL-CCNC: 47 U/L — SIGNIFICANT CHANGE UP (ref 40–120)
ALP SERPL-CCNC: 49 U/L — SIGNIFICANT CHANGE UP (ref 40–120)
ALP SERPL-CCNC: 50 U/L — SIGNIFICANT CHANGE UP (ref 40–120)
ALT FLD-CCNC: 6 U/L — LOW (ref 10–45)
ALT FLD-CCNC: 7 U/L — LOW (ref 10–45)
ALT FLD-CCNC: <5 U/L — LOW (ref 10–45)
ANION GAP SERPL CALC-SCNC: 12 MMOL/L — SIGNIFICANT CHANGE UP (ref 5–17)
ANION GAP SERPL CALC-SCNC: 14 MMOL/L — SIGNIFICANT CHANGE UP (ref 5–17)
ANION GAP SERPL CALC-SCNC: 18 MMOL/L — HIGH (ref 5–17)
APTT BLD: 32.1 SEC — SIGNIFICANT CHANGE UP (ref 24.5–35.6)
AST SERPL-CCNC: 10 U/L — SIGNIFICANT CHANGE UP (ref 10–40)
AST SERPL-CCNC: 17 U/L — SIGNIFICANT CHANGE UP (ref 10–40)
AST SERPL-CCNC: 9 U/L — LOW (ref 10–40)
BASOPHILS # BLD AUTO: 0 K/UL — SIGNIFICANT CHANGE UP (ref 0–0.2)
BASOPHILS # BLD AUTO: 0.02 K/UL — SIGNIFICANT CHANGE UP (ref 0–0.2)
BASOPHILS # BLD AUTO: 0.05 K/UL — SIGNIFICANT CHANGE UP (ref 0–0.2)
BASOPHILS NFR BLD AUTO: 0 % — SIGNIFICANT CHANGE UP (ref 0–2)
BASOPHILS NFR BLD AUTO: 0.1 % — SIGNIFICANT CHANGE UP (ref 0–2)
BASOPHILS NFR BLD AUTO: 0.5 % — SIGNIFICANT CHANGE UP (ref 0–2)
BILIRUB SERPL-MCNC: 0.1 MG/DL — LOW (ref 0.2–1.2)
BILIRUB SERPL-MCNC: <0.1 MG/DL — LOW (ref 0.2–1.2)
BILIRUB SERPL-MCNC: <0.1 MG/DL — LOW (ref 0.2–1.2)
BUN SERPL-MCNC: 39 MG/DL — HIGH (ref 7–23)
BUN SERPL-MCNC: 47 MG/DL — HIGH (ref 7–23)
BUN SERPL-MCNC: 51 MG/DL — HIGH (ref 7–23)
BURR CELLS BLD QL SMEAR: PRESENT — SIGNIFICANT CHANGE UP
CALCIUM SERPL-MCNC: 8.1 MG/DL — LOW (ref 8.4–10.5)
CALCIUM SERPL-MCNC: 8.8 MG/DL — SIGNIFICANT CHANGE UP (ref 8.4–10.5)
CALCIUM SERPL-MCNC: 8.9 MG/DL — SIGNIFICANT CHANGE UP (ref 8.4–10.5)
CHLORIDE SERPL-SCNC: 100 MMOL/L — SIGNIFICANT CHANGE UP (ref 96–108)
CHLORIDE SERPL-SCNC: 106 MMOL/L — SIGNIFICANT CHANGE UP (ref 96–108)
CHLORIDE SERPL-SCNC: 106 MMOL/L — SIGNIFICANT CHANGE UP (ref 96–108)
CO2 SERPL-SCNC: 21 MMOL/L — LOW (ref 22–31)
CO2 SERPL-SCNC: 21 MMOL/L — LOW (ref 22–31)
CO2 SERPL-SCNC: 22 MMOL/L — SIGNIFICANT CHANGE UP (ref 22–31)
CREAT SERPL-MCNC: 4.07 MG/DL — HIGH (ref 0.5–1.3)
CREAT SERPL-MCNC: 5.54 MG/DL — HIGH (ref 0.5–1.3)
CREAT SERPL-MCNC: 6.09 MG/DL — HIGH (ref 0.5–1.3)
DACRYOCYTES BLD QL SMEAR: SLIGHT — SIGNIFICANT CHANGE UP
EGFR: 10 ML/MIN/1.73M2 — LOW
EGFR: 14 ML/MIN/1.73M2 — LOW
EGFR: 9 ML/MIN/1.73M2 — LOW
EOSINOPHIL # BLD AUTO: 0 K/UL — SIGNIFICANT CHANGE UP (ref 0–0.5)
EOSINOPHIL # BLD AUTO: 0.01 K/UL — SIGNIFICANT CHANGE UP (ref 0–0.5)
EOSINOPHIL # BLD AUTO: 0.81 K/UL — HIGH (ref 0–0.5)
EOSINOPHIL NFR BLD AUTO: 0 % — SIGNIFICANT CHANGE UP (ref 0–6)
EOSINOPHIL NFR BLD AUTO: 0.1 % — SIGNIFICANT CHANGE UP (ref 0–6)
EOSINOPHIL NFR BLD AUTO: 8.1 % — HIGH (ref 0–6)
GAS PNL BLDA: SIGNIFICANT CHANGE UP
GAS PNL BLDV: SIGNIFICANT CHANGE UP
GLUCOSE BLDC GLUCOMTR-MCNC: 111 MG/DL — HIGH (ref 70–99)
GLUCOSE BLDC GLUCOMTR-MCNC: 142 MG/DL — HIGH (ref 70–99)
GLUCOSE BLDC GLUCOMTR-MCNC: 153 MG/DL — HIGH (ref 70–99)
GLUCOSE BLDC GLUCOMTR-MCNC: 88 MG/DL — SIGNIFICANT CHANGE UP (ref 70–99)
GLUCOSE SERPL-MCNC: 138 MG/DL — HIGH (ref 70–99)
GLUCOSE SERPL-MCNC: 153 MG/DL — HIGH (ref 70–99)
GLUCOSE SERPL-MCNC: 84 MG/DL — SIGNIFICANT CHANGE UP (ref 70–99)
HBV CORE AB SER-ACNC: SIGNIFICANT CHANGE UP
HBV SURFACE AB SER-ACNC: 15.7 MIU/ML — SIGNIFICANT CHANGE UP
HBV SURFACE AG SER-ACNC: SIGNIFICANT CHANGE UP
HCG SERPL-ACNC: <2 MIU/ML — SIGNIFICANT CHANGE UP
HCG UR QL: NEGATIVE — SIGNIFICANT CHANGE UP
HCT VFR BLD CALC: 28.3 % — LOW (ref 34.5–45)
HCT VFR BLD CALC: 28.3 % — LOW (ref 34.5–45)
HCT VFR BLD CALC: 30.9 % — LOW (ref 34.5–45)
HCV AB S/CO SERPL IA: 0.05 S/CO — SIGNIFICANT CHANGE UP
HCV AB SERPL-IMP: SIGNIFICANT CHANGE UP
HGB BLD-MCNC: 8.6 G/DL — LOW (ref 11.5–15.5)
HGB BLD-MCNC: 8.8 G/DL — LOW (ref 11.5–15.5)
HGB BLD-MCNC: 9.5 G/DL — LOW (ref 11.5–15.5)
HIV 1+2 AB+HIV1 P24 AG SERPL QL IA: SIGNIFICANT CHANGE UP
IMM GRANULOCYTES NFR BLD AUTO: 0.5 % — SIGNIFICANT CHANGE UP (ref 0–0.9)
IMM GRANULOCYTES NFR BLD AUTO: 0.7 % — SIGNIFICANT CHANGE UP (ref 0–0.9)
INR BLD: 0.96 RATIO — SIGNIFICANT CHANGE UP (ref 0.85–1.16)
LYMPHOCYTES # BLD AUTO: 0 % — LOW (ref 13–44)
LYMPHOCYTES # BLD AUTO: 0 K/UL — LOW (ref 1–3.3)
LYMPHOCYTES # BLD AUTO: 0.06 K/UL — LOW (ref 1–3.3)
LYMPHOCYTES # BLD AUTO: 0.4 % — LOW (ref 13–44)
LYMPHOCYTES # BLD AUTO: 2.02 K/UL — SIGNIFICANT CHANGE UP (ref 1–3.3)
LYMPHOCYTES # BLD AUTO: 20.3 % — SIGNIFICANT CHANGE UP (ref 13–44)
MAGNESIUM SERPL-MCNC: 2.2 MG/DL — SIGNIFICANT CHANGE UP (ref 1.6–2.6)
MAGNESIUM SERPL-MCNC: 2.4 MG/DL — SIGNIFICANT CHANGE UP (ref 1.6–2.6)
MAGNESIUM SERPL-MCNC: 2.5 MG/DL — SIGNIFICANT CHANGE UP (ref 1.6–2.6)
MANUAL SMEAR VERIFICATION: SIGNIFICANT CHANGE UP
MCHC RBC-ENTMCNC: 24.7 PG — LOW (ref 27–34)
MCHC RBC-ENTMCNC: 24.9 PG — LOW (ref 27–34)
MCHC RBC-ENTMCNC: 25.6 PG — LOW (ref 27–34)
MCHC RBC-ENTMCNC: 30.4 G/DL — LOW (ref 32–36)
MCHC RBC-ENTMCNC: 30.7 G/DL — LOW (ref 32–36)
MCHC RBC-ENTMCNC: 31.1 G/DL — LOW (ref 32–36)
MCV RBC AUTO: 79.9 FL — LOW (ref 80–100)
MCV RBC AUTO: 81.3 FL — SIGNIFICANT CHANGE UP (ref 80–100)
MCV RBC AUTO: 83.3 FL — SIGNIFICANT CHANGE UP (ref 80–100)
MONOCYTES # BLD AUTO: 0.24 K/UL — SIGNIFICANT CHANGE UP (ref 0–0.9)
MONOCYTES # BLD AUTO: 0.51 K/UL — SIGNIFICANT CHANGE UP (ref 0–0.9)
MONOCYTES # BLD AUTO: 0.54 K/UL — SIGNIFICANT CHANGE UP (ref 0–0.9)
MONOCYTES NFR BLD AUTO: 1.8 % — LOW (ref 2–14)
MONOCYTES NFR BLD AUTO: 3.7 % — SIGNIFICANT CHANGE UP (ref 2–14)
MONOCYTES NFR BLD AUTO: 5.4 % — SIGNIFICANT CHANGE UP (ref 2–14)
NEUTROPHILS # BLD AUTO: 13.05 K/UL — HIGH (ref 1.8–7.4)
NEUTROPHILS # BLD AUTO: 13.2 K/UL — HIGH (ref 1.8–7.4)
NEUTROPHILS # BLD AUTO: 6.5 K/UL — SIGNIFICANT CHANGE UP (ref 1.8–7.4)
NEUTROPHILS NFR BLD AUTO: 65.2 % — SIGNIFICANT CHANGE UP (ref 43–77)
NEUTROPHILS NFR BLD AUTO: 95 % — HIGH (ref 43–77)
NEUTROPHILS NFR BLD AUTO: 96.5 % — HIGH (ref 43–77)
NEUTS BAND # BLD: 1.7 % — SIGNIFICANT CHANGE UP (ref 0–8)
NRBC # BLD: 0 /100 WBCS — SIGNIFICANT CHANGE UP (ref 0–0)
NRBC # BLD: 0 /100 WBCS — SIGNIFICANT CHANGE UP (ref 0–0)
OVALOCYTES BLD QL SMEAR: SLIGHT — SIGNIFICANT CHANGE UP
PHOSPHATE SERPL-MCNC: 3.5 MG/DL — SIGNIFICANT CHANGE UP (ref 2.5–4.5)
PHOSPHATE SERPL-MCNC: 4.4 MG/DL — SIGNIFICANT CHANGE UP (ref 2.5–4.5)
PHOSPHATE SERPL-MCNC: 4.9 MG/DL — HIGH (ref 2.5–4.5)
PLAT MORPH BLD: NORMAL — SIGNIFICANT CHANGE UP
PLATELET # BLD AUTO: 257 K/UL — SIGNIFICANT CHANGE UP (ref 150–400)
PLATELET # BLD AUTO: 302 K/UL — SIGNIFICANT CHANGE UP (ref 150–400)
PLATELET # BLD AUTO: 335 K/UL — SIGNIFICANT CHANGE UP (ref 150–400)
POIKILOCYTOSIS BLD QL AUTO: SLIGHT — SIGNIFICANT CHANGE UP
POTASSIUM SERPL-MCNC: 4.5 MMOL/L — SIGNIFICANT CHANGE UP (ref 3.5–5.3)
POTASSIUM SERPL-MCNC: 4.5 MMOL/L — SIGNIFICANT CHANGE UP (ref 3.5–5.3)
POTASSIUM SERPL-MCNC: 5.8 MMOL/L — HIGH (ref 3.5–5.3)
POTASSIUM SERPL-SCNC: 4.5 MMOL/L — SIGNIFICANT CHANGE UP (ref 3.5–5.3)
POTASSIUM SERPL-SCNC: 4.5 MMOL/L — SIGNIFICANT CHANGE UP (ref 3.5–5.3)
POTASSIUM SERPL-SCNC: 5.8 MMOL/L — HIGH (ref 3.5–5.3)
PROT SERPL-MCNC: 6.6 G/DL — SIGNIFICANT CHANGE UP (ref 6–8.3)
PROT SERPL-MCNC: 6.7 G/DL — SIGNIFICANT CHANGE UP (ref 6–8.3)
PROT SERPL-MCNC: 7.1 G/DL — SIGNIFICANT CHANGE UP (ref 6–8.3)
PROTHROM AB SERPL-ACNC: 11 SEC — SIGNIFICANT CHANGE UP (ref 9.9–13.4)
RBC # BLD: 3.48 M/UL — LOW (ref 3.8–5.2)
RBC # BLD: 3.54 M/UL — LOW (ref 3.8–5.2)
RBC # BLD: 3.71 M/UL — LOW (ref 3.8–5.2)
RBC # FLD: 15.6 % — HIGH (ref 10.3–14.5)
RBC # FLD: 15.7 % — HIGH (ref 10.3–14.5)
RBC # FLD: 15.7 % — HIGH (ref 10.3–14.5)
RBC BLD AUTO: ABNORMAL
RH IG SCN BLD-IMP: POSITIVE — SIGNIFICANT CHANGE UP
SODIUM SERPL-SCNC: 139 MMOL/L — SIGNIFICANT CHANGE UP (ref 135–145)
SODIUM SERPL-SCNC: 140 MMOL/L — SIGNIFICANT CHANGE UP (ref 135–145)
SODIUM SERPL-SCNC: 141 MMOL/L — SIGNIFICANT CHANGE UP (ref 135–145)
TARGETS BLD QL SMEAR: SLIGHT — SIGNIFICANT CHANGE UP
WBC # BLD: 13.29 K/UL — HIGH (ref 3.8–10.5)
WBC # BLD: 13.9 K/UL — HIGH (ref 3.8–10.5)
WBC # BLD: 9.97 K/UL — SIGNIFICANT CHANGE UP (ref 3.8–10.5)
WBC # FLD AUTO: 13.29 K/UL — HIGH (ref 3.8–10.5)
WBC # FLD AUTO: 13.9 K/UL — HIGH (ref 3.8–10.5)
WBC # FLD AUTO: 9.97 K/UL — SIGNIFICANT CHANGE UP (ref 3.8–10.5)

## 2024-11-04 PROCEDURE — 50605 INSERT URETERAL SUPPORT: CPT | Mod: GC,LT

## 2024-11-04 PROCEDURE — 50325 PREP DONOR RENAL GRAFT: CPT | Mod: GC

## 2024-11-04 PROCEDURE — 71045 X-RAY EXAM CHEST 1 VIEW: CPT | Mod: 26

## 2024-11-04 PROCEDURE — 50360 RNL ALTRNSPLJ W/O RCP NFRCT: CPT | Mod: GC

## 2024-11-04 PROCEDURE — 76776 US EXAM K TRANSPL W/DOPPLER: CPT | Mod: 26,RT

## 2024-11-04 PROCEDURE — 93010 ELECTROCARDIOGRAM REPORT: CPT

## 2024-11-04 DEVICE — SURGICEL 2 X 14": Type: IMPLANTABLE DEVICE | Site: RIGHT | Status: FUNCTIONAL

## 2024-11-04 DEVICE — STENT URET POLARIS ULTRA 5X12CM: Type: IMPLANTABLE DEVICE | Site: RIGHT | Status: FUNCTIONAL

## 2024-11-04 DEVICE — GUIDEWIRE SENSOR DUAL-FLEX NITINOL STRAIGHT .035" X 150CM: Type: IMPLANTABLE DEVICE | Site: RIGHT | Status: FUNCTIONAL

## 2024-11-04 RX ORDER — SODIUM CHLORIDE 9 MG/ML
3 INJECTION, SOLUTION INTRAMUSCULAR; INTRAVENOUS; SUBCUTANEOUS EVERY 8 HOURS
Refills: 0 | Status: DISCONTINUED | OUTPATIENT
Start: 2024-11-04 | End: 2024-11-04

## 2024-11-04 RX ORDER — ONDANSETRON HYDROCHLORIDE 2 MG/ML
4 INJECTION, SOLUTION INTRAMUSCULAR; INTRAVENOUS EVERY 6 HOURS
Refills: 0 | Status: DISCONTINUED | OUTPATIENT
Start: 2024-11-04 | End: 2024-11-08

## 2024-11-04 RX ORDER — ONDANSETRON HYDROCHLORIDE 2 MG/ML
4 INJECTION, SOLUTION INTRAMUSCULAR; INTRAVENOUS ONCE
Refills: 0 | Status: DISCONTINUED | OUTPATIENT
Start: 2024-11-04 | End: 2024-11-04

## 2024-11-04 RX ORDER — CIPROFLOXACIN LACTATE 200MG/20ML
400 VIAL (ML) INTRAVENOUS ONCE
Refills: 0 | Status: DISCONTINUED | OUTPATIENT
Start: 2024-11-04 | End: 2024-11-04

## 2024-11-04 RX ORDER — INSULIN REG, HUM S-S BUFF 100/ML
5 VIAL (ML) INJECTION ONCE
Refills: 0 | Status: COMPLETED | OUTPATIENT
Start: 2024-11-04 | End: 2024-11-04

## 2024-11-04 RX ORDER — VALGANCICLOVIR 450 MG/1
450 TABLET, FILM COATED ORAL DAILY
Refills: 0 | Status: DISCONTINUED | OUTPATIENT
Start: 2024-11-05 | End: 2024-11-06

## 2024-11-04 RX ORDER — FENTANYL CITRAT/DEXTROSE 5%/PF 1250MCG/50
50 PATIENT CONTROLLED ANALGESIA SYRINGE INTRAVENOUS
Refills: 0 | Status: DISCONTINUED | OUTPATIENT
Start: 2024-11-04 | End: 2024-11-04

## 2024-11-04 RX ORDER — FAMOTIDINE 10 MG/ML
10 INJECTION INTRAVENOUS
Refills: 0 | Status: DISCONTINUED | OUTPATIENT
Start: 2024-11-04 | End: 2024-11-04

## 2024-11-04 RX ORDER — NYSTATIN 10B UNIT
500000 POWDER (EA) MISCELLANEOUS
Refills: 0 | Status: DISCONTINUED | OUTPATIENT
Start: 2024-11-05 | End: 2024-11-08

## 2024-11-04 RX ORDER — METHYLPREDNISOLONE ACETATE 80 MG/ML
500 INJECTION, SUSPENSION INTRALESIONAL; INTRAMUSCULAR; INTRASYNOVIAL; SOFT TISSUE ONCE
Refills: 0 | Status: DISCONTINUED | OUTPATIENT
Start: 2024-11-04 | End: 2024-11-04

## 2024-11-04 RX ORDER — FAMOTIDINE 10 MG/ML
20 INJECTION INTRAVENOUS DAILY
Refills: 0 | Status: DISCONTINUED | OUTPATIENT
Start: 2024-11-04 | End: 2024-11-08

## 2024-11-04 RX ORDER — MYCOPHENOLATE MOFETIL 500 MG/1
1 TABLET, FILM COATED ORAL EVERY 12 HOURS
Refills: 0 | Status: DISCONTINUED | OUTPATIENT
Start: 2024-11-05 | End: 2024-11-08

## 2024-11-04 RX ORDER — LIDOCAINE HCL 60 MG/3 ML
0.2 SYRINGE (ML) INJECTION ONCE
Refills: 0 | Status: DISCONTINUED | OUTPATIENT
Start: 2024-11-04 | End: 2024-11-04

## 2024-11-04 RX ORDER — FENTANYL CITRAT/DEXTROSE 5%/PF 1250MCG/50
25 PATIENT CONTROLLED ANALGESIA SYRINGE INTRAVENOUS
Refills: 0 | Status: DISCONTINUED | OUTPATIENT
Start: 2024-11-04 | End: 2024-11-04

## 2024-11-04 RX ORDER — SULFAMETHOXAZOLE/TRIMETHOPRIM 800-160 MG
1 TABLET ORAL DAILY
Refills: 0 | Status: DISCONTINUED | OUTPATIENT
Start: 2024-11-05 | End: 2024-11-08

## 2024-11-04 RX ORDER — CHLORHEXIDINE GLUCONATE 40 MG/ML
1 SOLUTION TOPICAL DAILY
Refills: 0 | Status: DISCONTINUED | OUTPATIENT
Start: 2024-11-04 | End: 2024-11-08

## 2024-11-04 RX ORDER — CHLORHEXIDINE GLUCONATE 40 MG/ML
1 SOLUTION TOPICAL ONCE
Refills: 0 | Status: DISCONTINUED | OUTPATIENT
Start: 2024-11-04 | End: 2024-11-04

## 2024-11-04 RX ADMIN — Medication 5 UNIT(S): at 07:54

## 2024-11-04 RX ADMIN — Medication 50 MILLILITER(S): at 16:42

## 2024-11-04 RX ADMIN — Medication 50 MILLILITER(S): at 07:54

## 2024-11-04 RX ADMIN — Medication 70 MILLILITER(S): at 16:43

## 2024-11-04 NOTE — BRIEF OPERATIVE NOTE - OPERATION/FINDINGS
S/P DDRT/ LDRT: Left Kidney transplanted on the RLQ under steroid and Simulect/Thymo induction    Vein clamp time :41 minutes  Artery clamp time: 17 minutes    1V / 1A/ 1 U (stented)     LDRT: Left Kidney transplanted on the RLQ under steroid and Simulect/Thymo induction    Vein clamp time :41 minutes  Artery clamp time: 17 minutes    1V / 1A/ 1 U (stented)

## 2024-11-04 NOTE — BRIEF OPERATIVE NOTE - NSICDXBRIEFPOSTOP_GEN_ALL_CORE_FT
POST-OP DIAGNOSIS:  FSGS (focal segmental glomerulosclerosis) 04-Nov-2024 15:46:46  Shante Simmons

## 2024-11-04 NOTE — BRIEF OPERATIVE NOTE - NSICDXBRIEFPROCEDURE_GEN_ALL_CORE_FT
PROCEDURES:  Transplant, kidney, living donor, open 24-Oct-2024 10:17:59 Open living donor kidney transplant receipient to the right side possible left on 11/04/2024. Олег Bush

## 2024-11-04 NOTE — PRE-OP CHECKLIST - COMMENTS
Medication: clopidogrel  Last office visit date: 8/2/23  Next appointment scheduled?: Yes   Number of refills given: 2     Pt VBG K was 6.3, MD blair at bedside. Orders  placed to shift pt at this time pre-op. Pt has no Hx of HD.

## 2024-11-04 NOTE — PROGRESS NOTE ADULT - SUBJECTIVE AND OBJECTIVE BOX
Transplant Surgery - POST OP CHECK   --------------------------------------------------------------   Tx Date: 11/4/2024        POD#0     HPI: 33yo RHD female with advanced CKD since 10/ 2015, FSIG diagnosed in 2016 currently on Farxiga now s/p Open living donor kidney transplant recipient 11/04/2024.    Interval Events:  - A&Ox3, lethargic   - high urine output       Immunosupression:  Induction: thymo   Maintenance: FK/MMF/SST  Ongoing monitoring for signs of rejection     Potential Discharge date: TBD  Education:  Medications  Plan of care:  See Below    MEDICATIONS  (STANDING):  chlorhexidine 2% Cloths 1 Application(s) Topical daily  ciprofloxacin   IVPB 400 milliGRAM(s) IV Intermittent once  lactated ringers. 1000 milliLiter(s) (75 mL/Hr) IV Continuous <Continuous>  multiple electrolytes Injection Type 1 1000 milliLiter(s) (70 mL/Hr) IV Continuous <Continuous>  multiple electrolytes Injection Type 1 1000 milliLiter(s) (50 mL/Hr) IV Continuous <Continuous>    MEDICATIONS  (PRN):  fentaNYL    Injectable 50 MICROGram(s) IV Push every 5 minutes PRN Severe Pain (7 - 10)  fentaNYL    Injectable 25 MICROGram(s) IV Push every 5 minutes PRN Moderate Pain (4 - 6)  ondansetron Injectable 4 milliGRAM(s) IV Push every 6 hours PRN Nausea and/or Vomiting  ondansetron Injectable 4 milliGRAM(s) IV Push once PRN Nausea and/or Vomiting      PAST MEDICAL & SURGICAL HISTORY:  FSGS (focal segmental glomerulosclerosis)      CKD (chronic kidney disease)      Back pain      KEN (obstructive sleep apnea)      S/P arteriovenous (AV) fistula creation      Vital Signs Last 24 Hrs  T(C): 36.5 (04 Nov 2024 18:00), Max: 36.5 (04 Nov 2024 15:30)  T(F): 97.7 (04 Nov 2024 18:00), Max: 97.7 (04 Nov 2024 15:30)  HR: 105 (04 Nov 2024 18:00) (92 - 110)  BP: 123/62 (04 Nov 2024 18:00) (119/56 - 127/78)  BP(mean): 85 (04 Nov 2024 18:00) (81 - 85)  RR: 15 (04 Nov 2024 18:00) (15 - 20)  SpO2: 100% (04 Nov 2024 18:00) (93% - 100%)    Parameters below as of 04 Nov 2024 18:00  Patient On (Oxygen Delivery Method): nasal cannula  O2 Flow (L/min): 2      I&O's Summary    04 Nov 2024 07:01  -  04 Nov 2024 18:43  --------------------------------------------------------  IN: 1720 mL / OUT: 1650 mL / NET: 70 mL                              8.6    13.29 )-----------( 302      ( 04 Nov 2024 16:14 )             28.3     11-04    141  |  106  |  47[H]  ----------------------------<  138[H]  4.5   |  21[L]  |  5.54[H]    Ca    8.9      04 Nov 2024 16:14  Phos  4.4     11-04  Mg     2.2     11-04    TPro  6.6  /  Alb  3.7  /  TBili  <0.1[L]  /  DBili  x   /  AST  10  /  ALT  6[L]  /  AlkPhos  49  11-04        Review of systems  All other systems were reviewed and are negative, except as noted.      PHYSICAL EXAM:  Constitutional: Well developed / well nourished  Eyes: Anicteric, PERRLA  ENMT: nc/at  Neck: supple  Respiratory: CTA B/L, 2L NC  Cardiovascular: RRR  Gastrointestinal: Soft, non distended, mild tenderness at the incision site; incision closed with stitches, c/d/i  Genitourinary: Urinary catheter in place  Extremities: SCD's in place and working bilaterally  Vascular: Palpable dp pulses bilaterally  Neurological: A&Ox4  Skin: no rashes, ulcerations or lesions;  Musculoskeletal: Moving all extremities  Psychiatric: Responsive

## 2024-11-04 NOTE — PATIENT PROFILE ADULT - FALL HARM RISK - UNIVERSAL INTERVENTIONS
Bed in lowest position, wheels locked, appropriate side rails in place/Call bell, personal items and telephone in reach/Instruct patient to call for assistance before getting out of bed or chair/Non-slip footwear when patient is out of bed/[place] to call system/Physically safe environment - no spills, clutter or unnecessary equipment/Purposeful Proactive Rounding/Room/bathroom lighting operational, light cord in reach

## 2024-11-04 NOTE — PROGRESS NOTE ADULT - ASSESSMENT
33yo RHD female with advanced CKD since 10/ 2015, FSIG diagnosed in 2016 currently on Farxiga now s/p Open living donor kidney transplant recipient 11/04/2024.    OR: EBL 50, 1a/1v/1u+stent    [] LDRT POD #0  - Renal US   - Strict I/O's, Thomas  - NPO  - Maintnence IVF and 1:1 UOP repletions for any UOP > 70 mL/hr  - Pain management  - Bowel regimen  - SCD/PT/IS    [] Immunosuppression  - thymo induction  - Envarsus TBD, MMF 1/1, steroid taper  - ppx: nystatin, Bactrim, Valcyte

## 2024-11-05 DIAGNOSIS — Z94.0 KIDNEY TRANSPLANT STATUS: ICD-10-CM

## 2024-11-05 LAB
ALBUMIN SERPL ELPH-MCNC: 3.3 G/DL — SIGNIFICANT CHANGE UP (ref 3.3–5)
ALP SERPL-CCNC: 51 U/L — SIGNIFICANT CHANGE UP (ref 40–120)
ALT FLD-CCNC: 6 U/L — LOW (ref 10–45)
ANION GAP SERPL CALC-SCNC: 20 MMOL/L — HIGH (ref 5–17)
AST SERPL-CCNC: 18 U/L — SIGNIFICANT CHANGE UP (ref 10–40)
BASOPHILS # BLD AUTO: 0.01 K/UL — SIGNIFICANT CHANGE UP (ref 0–0.2)
BASOPHILS NFR BLD AUTO: 0.1 % — SIGNIFICANT CHANGE UP (ref 0–2)
BILIRUB SERPL-MCNC: 0.2 MG/DL — SIGNIFICANT CHANGE UP (ref 0.2–1.2)
BUN SERPL-MCNC: 30 MG/DL — HIGH (ref 7–23)
CALCIUM SERPL-MCNC: 7.6 MG/DL — LOW (ref 8.4–10.5)
CHLORIDE SERPL-SCNC: 103 MMOL/L — SIGNIFICANT CHANGE UP (ref 96–108)
CO2 SERPL-SCNC: 21 MMOL/L — LOW (ref 22–31)
CREAT SERPL-MCNC: 3.04 MG/DL — HIGH (ref 0.5–1.3)
EGFR: 20 ML/MIN/1.73M2 — LOW
EOSINOPHIL # BLD AUTO: 0.01 K/UL — SIGNIFICANT CHANGE UP (ref 0–0.5)
EOSINOPHIL NFR BLD AUTO: 0.1 % — SIGNIFICANT CHANGE UP (ref 0–6)
GLUCOSE BLDC GLUCOMTR-MCNC: 105 MG/DL — HIGH (ref 70–99)
GLUCOSE BLDC GLUCOMTR-MCNC: 106 MG/DL — HIGH (ref 70–99)
GLUCOSE BLDC GLUCOMTR-MCNC: 137 MG/DL — HIGH (ref 70–99)
GLUCOSE BLDC GLUCOMTR-MCNC: 137 MG/DL — HIGH (ref 70–99)
GLUCOSE BLDC GLUCOMTR-MCNC: 157 MG/DL — HIGH (ref 70–99)
GLUCOSE BLDC GLUCOMTR-MCNC: 203 MG/DL — HIGH (ref 70–99)
GLUCOSE SERPL-MCNC: 121 MG/DL — HIGH (ref 70–99)
HCT VFR BLD CALC: 31.9 % — LOW (ref 34.5–45)
HCV RNA SPEC NAA+PROBE-LOG IU: SIGNIFICANT CHANGE UP
HCV RNA SPEC NAA+PROBE-LOG IU: SIGNIFICANT CHANGE UP LOGIU/ML
HGB BLD-MCNC: 9.6 G/DL — LOW (ref 11.5–15.5)
IMM GRANULOCYTES NFR BLD AUTO: 0.7 % — SIGNIFICANT CHANGE UP (ref 0–0.9)
LYMPHOCYTES # BLD AUTO: 0.08 K/UL — LOW (ref 1–3.3)
LYMPHOCYTES # BLD AUTO: 0.6 % — LOW (ref 13–44)
MAGNESIUM SERPL-MCNC: 2.8 MG/DL — HIGH (ref 1.6–2.6)
MCHC RBC-ENTMCNC: 25.1 PG — LOW (ref 27–34)
MCHC RBC-ENTMCNC: 30.1 G/DL — LOW (ref 32–36)
MCV RBC AUTO: 83.3 FL — SIGNIFICANT CHANGE UP (ref 80–100)
MONOCYTES # BLD AUTO: 0.54 K/UL — SIGNIFICANT CHANGE UP (ref 0–0.9)
MONOCYTES NFR BLD AUTO: 3.9 % — SIGNIFICANT CHANGE UP (ref 2–14)
NEUTROPHILS # BLD AUTO: 13.02 K/UL — HIGH (ref 1.8–7.4)
NEUTROPHILS NFR BLD AUTO: 94.6 % — HIGH (ref 43–77)
NRBC # BLD: 0 /100 WBCS — SIGNIFICANT CHANGE UP (ref 0–0)
PHOSPHATE SERPL-MCNC: 4.6 MG/DL — HIGH (ref 2.5–4.5)
PLATELET # BLD AUTO: 265 K/UL — SIGNIFICANT CHANGE UP (ref 150–400)
POTASSIUM SERPL-MCNC: 5 MMOL/L — SIGNIFICANT CHANGE UP (ref 3.5–5.3)
POTASSIUM SERPL-SCNC: 5 MMOL/L — SIGNIFICANT CHANGE UP (ref 3.5–5.3)
PROT SERPL-MCNC: 6.8 G/DL — SIGNIFICANT CHANGE UP (ref 6–8.3)
RBC # BLD: 3.83 M/UL — SIGNIFICANT CHANGE UP (ref 3.8–5.2)
RBC # FLD: 15.9 % — HIGH (ref 10.3–14.5)
SODIUM SERPL-SCNC: 144 MMOL/L — SIGNIFICANT CHANGE UP (ref 135–145)
WBC # BLD: 13.75 K/UL — HIGH (ref 3.8–10.5)
WBC # FLD AUTO: 13.75 K/UL — HIGH (ref 3.8–10.5)

## 2024-11-05 PROCEDURE — 99222 1ST HOSP IP/OBS MODERATE 55: CPT | Mod: GC

## 2024-11-05 RX ORDER — TRAMADOL HYDROCHLORIDE 50 MG/1
50 TABLET, COATED ORAL EVERY 8 HOURS
Refills: 0 | Status: DISCONTINUED | OUTPATIENT
Start: 2024-11-05 | End: 2024-11-08

## 2024-11-05 RX ORDER — ACETAMINOPHEN 500 MG
650 TABLET ORAL ONCE
Refills: 0 | Status: COMPLETED | OUTPATIENT
Start: 2024-11-05 | End: 2024-11-05

## 2024-11-05 RX ORDER — TRAMADOL HYDROCHLORIDE 50 MG/1
25 TABLET, COATED ORAL EVERY 8 HOURS
Refills: 0 | Status: DISCONTINUED | OUTPATIENT
Start: 2024-11-05 | End: 2024-11-08

## 2024-11-05 RX ORDER — VALGANCICLOVIR HYDROCHLORIDE 450 MG/1
450 TABLET ORAL DAILY
Qty: 60 | Refills: 5 | Status: ACTIVE | COMMUNITY
Start: 2024-11-05 | End: 1900-01-01

## 2024-11-05 RX ORDER — MYCOPHENOLATE MOFETIL 500 MG/1
500 TABLET ORAL TWICE DAILY
Qty: 360 | Refills: 3 | Status: ACTIVE | COMMUNITY
Start: 2024-11-05 | End: 1900-01-01

## 2024-11-05 RX ORDER — POLYETHYLENE GLYCOL 3350 17 G/17G
17 POWDER, FOR SOLUTION ORAL DAILY
Refills: 0 | Status: DISCONTINUED | OUTPATIENT
Start: 2024-11-05 | End: 2024-11-08

## 2024-11-05 RX ORDER — NIFEDIPINE 30 MG/1
30 TABLET, FILM COATED, EXTENDED RELEASE ORAL
Qty: 30 | Refills: 5 | Status: ACTIVE | COMMUNITY
Start: 2024-11-05 | End: 1900-01-01

## 2024-11-05 RX ORDER — NYSTATIN 100000 [USP'U]/ML
100000 SUSPENSION ORAL 4 TIMES DAILY
Qty: 1 | Refills: 0 | Status: ACTIVE | COMMUNITY
Start: 2024-11-05 | End: 1900-01-01

## 2024-11-05 RX ORDER — METHYLPREDNISOLONE ACETATE 80 MG/ML
250 INJECTION, SUSPENSION INTRALESIONAL; INTRAMUSCULAR; INTRASYNOVIAL; SOFT TISSUE ONCE
Refills: 0 | Status: COMPLETED | OUTPATIENT
Start: 2024-11-05 | End: 2024-11-05

## 2024-11-05 RX ORDER — HEPARIN SODIUM 10000 [USP'U]/ML
5000 INJECTION INTRAVENOUS; SUBCUTANEOUS EVERY 12 HOURS
Refills: 0 | Status: DISCONTINUED | OUTPATIENT
Start: 2024-11-05 | End: 2024-11-08

## 2024-11-05 RX ORDER — FAMOTIDINE 20 MG/1
20 TABLET, FILM COATED ORAL
Qty: 30 | Refills: 11 | Status: ACTIVE | COMMUNITY
Start: 2024-11-05 | End: 1900-01-01

## 2024-11-05 RX ORDER — SENNOSIDES 8.6 MG TABLETS 8.6 MG/1
8.6 TABLET ORAL
Qty: 30 | Refills: 3 | Status: ACTIVE | COMMUNITY
Start: 2024-11-05 | End: 1900-01-01

## 2024-11-05 RX ORDER — PREDNISONE 5 MG/1
5 TABLET ORAL
Qty: 41 | Refills: 0 | Status: ACTIVE | COMMUNITY
Start: 2024-11-05 | End: 1900-01-01

## 2024-11-05 RX ORDER — SENNA 187 MG
2 TABLET ORAL AT BEDTIME
Refills: 0 | Status: DISCONTINUED | OUTPATIENT
Start: 2024-11-05 | End: 2024-11-08

## 2024-11-05 RX ORDER — TACROLIMUS 0.5 MG/1
5 CAPSULE, GELATIN COATED ORAL ONCE
Refills: 0 | Status: COMPLETED | OUTPATIENT
Start: 2024-11-05 | End: 2024-11-05

## 2024-11-05 RX ORDER — SULFAMETHOXAZOLE AND TRIMETHOPRIM 400; 80 MG/1; MG/1
400-80 TABLET ORAL
Qty: 30 | Refills: 11 | Status: ACTIVE | COMMUNITY
Start: 2024-11-05 | End: 1900-01-01

## 2024-11-05 RX ORDER — DIPHENHYDRAMINE HCL 12.5MG/5ML
50 ELIXIR ORAL ONCE
Refills: 0 | Status: COMPLETED | OUTPATIENT
Start: 2024-11-05 | End: 2024-11-05

## 2024-11-05 RX ORDER — TACROLIMUS 0.5 MG/1
5 CAPSULE, GELATIN COATED ORAL
Refills: 0 | Status: DISCONTINUED | OUTPATIENT
Start: 2024-11-06 | End: 2024-11-06

## 2024-11-05 RX ORDER — ATORVASTATIN CALCIUM 20 MG/1
20 TABLET, FILM COATED ORAL
Qty: 30 | Refills: 2 | Status: ACTIVE | COMMUNITY
Start: 2024-11-05 | End: 1900-01-01

## 2024-11-05 RX ORDER — PREDNISONE 5 MG/1
5 TABLET ORAL
Qty: 30 | Refills: 11 | Status: ACTIVE | COMMUNITY
Start: 2024-11-05 | End: 1900-01-01

## 2024-11-05 RX ADMIN — METHYLPREDNISOLONE ACETATE 50 MILLIGRAM(S): 80 INJECTION, SUSPENSION INTRALESIONAL; INTRAMUSCULAR; INTRASYNOVIAL; SOFT TISSUE at 12:02

## 2024-11-05 RX ADMIN — CHLORHEXIDINE GLUCONATE 1 APPLICATION(S): 40 SOLUTION TOPICAL at 11:39

## 2024-11-05 RX ADMIN — Medication 50 MILLIGRAM(S): at 12:01

## 2024-11-05 RX ADMIN — FAMOTIDINE 20 MILLIGRAM(S): 10 INJECTION INTRAVENOUS at 11:28

## 2024-11-05 RX ADMIN — Medication 500000 UNIT(S): at 00:33

## 2024-11-05 RX ADMIN — MYCOPHENOLATE MOFETIL 1 GRAM(S): 500 TABLET, FILM COATED ORAL at 07:45

## 2024-11-05 RX ADMIN — Medication 2 TABLET(S): at 21:25

## 2024-11-05 RX ADMIN — MYCOPHENOLATE MOFETIL 1 GRAM(S): 500 TABLET, FILM COATED ORAL at 21:25

## 2024-11-05 RX ADMIN — POLYETHYLENE GLYCOL 3350 17 GRAM(S): 17 POWDER, FOR SOLUTION ORAL at 17:20

## 2024-11-05 RX ADMIN — TRAMADOL HYDROCHLORIDE 50 MILLIGRAM(S): 50 TABLET, COATED ORAL at 08:55

## 2024-11-05 RX ADMIN — Medication 650 MILLIGRAM(S): at 12:01

## 2024-11-05 RX ADMIN — Medication 500000 UNIT(S): at 23:52

## 2024-11-05 RX ADMIN — VALGANCICLOVIR 450 MILLIGRAM(S): 450 TABLET, FILM COATED ORAL at 11:28

## 2024-11-05 RX ADMIN — TACROLIMUS 5 MILLIGRAM(S): 0.5 CAPSULE, GELATIN COATED ORAL at 10:42

## 2024-11-05 RX ADMIN — TRAMADOL HYDROCHLORIDE 50 MILLIGRAM(S): 50 TABLET, COATED ORAL at 09:25

## 2024-11-05 RX ADMIN — Medication 500000 UNIT(S): at 17:20

## 2024-11-05 RX ADMIN — Medication 1 TABLET(S): at 11:28

## 2024-11-05 RX ADMIN — Medication 500 MILLILITER(S): at 00:15

## 2024-11-05 RX ADMIN — Medication 20 MILLIGRAM(S): at 21:25

## 2024-11-05 RX ADMIN — HEPARIN SODIUM 5000 UNIT(S): 10000 INJECTION INTRAVENOUS; SUBCUTANEOUS at 17:20

## 2024-11-05 RX ADMIN — Medication 500000 UNIT(S): at 05:15

## 2024-11-05 RX ADMIN — Medication 500000 UNIT(S): at 11:28

## 2024-11-05 NOTE — PHYSICAL THERAPY INITIAL EVALUATION ADULT - ADDITIONAL COMMENTS
Pt lives in a PH with her mother +1 flight of stairs to negotiate, pt states she will be staying at a hotel upon D/C. PTA pt was ambulating (I) without an AD and was (I) with all ADLs

## 2024-11-05 NOTE — PHARMACY EDUCATION NOTE - EDUCATION SUMMARY
Discharge immunosuppressant medications and prophylactic anti-infective agents reviewed with the patient. Outpatient medication schedule was discussed in detail including: medication name, indication, dose, administration times, treatment duration, side effects, drug interactions, and special instructions. Mycophenolate REMS program was discussed and information brochure was provided. Patient questions and concerns were answered and addressed. Patient demonstrated understanding. Stent education was provided to patient.

## 2024-11-05 NOTE — PHYSICAL THERAPY INITIAL EVALUATION ADULT - PLANNED THERAPY INTERVENTIONS, PT EVAL
GOAL: Pt will ascend/descend (12) steps (I) with U HR and step over step pattern in 4 weeks./bed mobility training/gait training/transfer training

## 2024-11-05 NOTE — CONSULT NOTE ADULT - ATTENDING COMMENTS
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 Patient with kidney biopsy done in 2015 ( at [facility]) with FSGS, was on steroids for 2-3 years, Patient underwent LDRT with renal transplant on 11/4/24, on the RLQ under steroid and Thymo induction.   Immediate function of transplant  Monitor p/c ratio for FSGS recurrence.    cont thymo induction.   Start nifedipine 30 if BP keeps rising.

## 2024-11-05 NOTE — PROGRESS NOTE ADULT - SUBJECTIVE AND OBJECTIVE BOX
Transplant Surgery - Multidisciplinary Rounds   --------------------------------------------------------------   Tx Date: 11/4/2024        POD#1    HPI: 33yo RHD female with advanced CKD since 10/ 2015, FSIG diagnosed in 2016 currently on Farxiga now s/p Open living donor kidney transplant recipient 11/04/2024.    Interval Events:  -    - high urine output       Immunosupression:  Induction: thymo   Maintenance: FK/MMF/SST  Ongoing monitoring for signs of rejection     Potential Discharge date: TBD  Education:  Medications  Plan of care:  See Below    MEDICATIONS  (STANDING):  chlorhexidine 2% Cloths 1 Application(s) Topical daily  ciprofloxacin   IVPB 400 milliGRAM(s) IV Intermittent once  lactated ringers. 1000 milliLiter(s) (75 mL/Hr) IV Continuous <Continuous>  multiple electrolytes Injection Type 1 1000 milliLiter(s) (70 mL/Hr) IV Continuous <Continuous>  multiple electrolytes Injection Type 1 1000 milliLiter(s) (50 mL/Hr) IV Continuous <Continuous>    MEDICATIONS  (PRN):  fentaNYL    Injectable 50 MICROGram(s) IV Push every 5 minutes PRN Severe Pain (7 - 10)  fentaNYL    Injectable 25 MICROGram(s) IV Push every 5 minutes PRN Moderate Pain (4 - 6)  ondansetron Injectable 4 milliGRAM(s) IV Push every 6 hours PRN Nausea and/or Vomiting  ondansetron Injectable 4 milliGRAM(s) IV Push once PRN Nausea and/or Vomiting      PAST MEDICAL & SURGICAL HISTORY:  FSGS (focal segmental glomerulosclerosis)      CKD (chronic kidney disease)      Back pain      KEN (obstructive sleep apnea)      S/P arteriovenous (AV) fistula creation      Vital Signs Last 24 Hrs  T(C): 36.5 (04 Nov 2024 18:00), Max: 36.5 (04 Nov 2024 15:30)  T(F): 97.7 (04 Nov 2024 18:00), Max: 97.7 (04 Nov 2024 15:30)  HR: 105 (04 Nov 2024 18:00) (92 - 110)  BP: 123/62 (04 Nov 2024 18:00) (119/56 - 127/78)  BP(mean): 85 (04 Nov 2024 18:00) (81 - 85)  RR: 15 (04 Nov 2024 18:00) (15 - 20)  SpO2: 100% (04 Nov 2024 18:00) (93% - 100%)    Parameters below as of 04 Nov 2024 18:00  Patient On (Oxygen Delivery Method): nasal cannula  O2 Flow (L/min): 2      I&O's Summary    04 Nov 2024 07:01  -  04 Nov 2024 18:43  --------------------------------------------------------  IN: 1720 mL / OUT: 1650 mL / NET: 70 mL                              8.6    13.29 )-----------( 302      ( 04 Nov 2024 16:14 )             28.3     11-04    141  |  106  |  47[H]  ----------------------------<  138[H]  4.5   |  21[L]  |  5.54[H]    Ca    8.9      04 Nov 2024 16:14  Phos  4.4     11-04  Mg     2.2     11-04    TPro  6.6  /  Alb  3.7  /  TBili  <0.1[L]  /  DBili  x   /  AST  10  /  ALT  6[L]  /  AlkPhos  49  11-04        Review of systems  All other systems were reviewed and are negative, except as noted.      PHYSICAL EXAM:  Constitutional: Well developed / well nourished  Eyes: Anicteric, PERRLA  ENMT: nc/at  Neck: supple  Respiratory: CTA B/L, 2L NC  Cardiovascular: RRR  Gastrointestinal: Soft, non distended, mild tenderness at the incision site; incision closed with stitches, c/d/i  Genitourinary: Urinary catheter in place  Extremities: SCD's in place and working bilaterally  Vascular: Palpable dp pulses bilaterally  Neurological: A&Ox4  Skin: no rashes, ulcerations or lesions;  Musculoskeletal: Moving all extremities  Psychiatric: Responsive     Transplant Surgery - Multidisciplinary Rounds   --------------------------------------------------------------   Tx Date: 11/4/2024        POD#1    Present: Patient seen and examined with multidisciplinary Transplant team including Surgeon Dr. Delgado, Nephrologist, Dr. Chau, Surgical/Hepatology fellow, JEFF Aguilar/Jude,  Pharmacist, Nutritionist,  and bedside RN during AM rounds.   Disciplines not in attendance will be notified of the plan.     HPI: 33yo RHD female with advanced CKD since 10/ 2015, FSIG diagnosed in 2016 currently on Farxiga now s/p Open living donor kidney transplant recipient 11/04/2024.    Interval Events:  -  VSS, no ON events   - high urine output       Immunosupression:  Induction: thymo   Maintenance: FK/MMF/SST  Ongoing monitoring for signs of rejection     Potential Discharge date: TBD  Education:  Medications  Plan of care:  See Below      MEDICATIONS  (STANDING):  acetaminophen     Tablet .. 650 milliGRAM(s) Oral once  antithymocyte globulin rabbit (peripheral) IVPB w/additives 150 milliGRAM(s) IV Intermittent once  atorvastatin 20 milliGRAM(s) Oral at bedtime  chlorhexidine 2% Cloths 1 Application(s) Topical daily  diphenhydrAMINE 50 milliGRAM(s) Oral once  famotidine    Tablet 20 milliGRAM(s) Oral daily  heparin   Injectable 5000 Unit(s) SubCutaneous every 12 hours  methylPREDNISolone sodium succinate IVPB 250 milliGRAM(s) IV Intermittent once  multiple electrolytes Injection Type 1 1000 milliLiter(s) (70 mL/Hr) IV Continuous <Continuous>  mycophenolate mofetil 1 Gram(s) Oral every 12 hours  nystatin    Suspension 188135 Unit(s) Swish and Swallow four times a day  senna 2 Tablet(s) Oral at bedtime  trimethoprim   80 mG/sulfamethoxazole 400 mG 1 Tablet(s) Oral daily  valGANciclovir 450 milliGRAM(s) Oral daily    MEDICATIONS  (PRN):  ondansetron Injectable 4 milliGRAM(s) IV Push every 6 hours PRN Nausea and/or Vomiting  traMADol 25 milliGRAM(s) Oral every 8 hours PRN Moderate Pain (4 - 6)  traMADol 50 milliGRAM(s) Oral every 8 hours PRN Severe Pain (7 - 10)      PAST MEDICAL & SURGICAL HISTORY:  FSGS (focal segmental glomerulosclerosis)      CKD (chronic kidney disease)      Back pain      KEN (obstructive sleep apnea)      S/P arteriovenous (AV) fistula creation          Vital Signs Last 24 Hrs  T(C): 36.3 (05 Nov 2024 09:00), Max: 36.7 (05 Nov 2024 01:00)  T(F): 97.4 (05 Nov 2024 09:00), Max: 98 (05 Nov 2024 01:00)  HR: 84 (05 Nov 2024 09:00) (84 - 110)  BP: 133/83 (05 Nov 2024 09:00) (119/56 - 152/81)  BP(mean): 108 (05 Nov 2024 05:00) (81 - 112)  RR: 16 (05 Nov 2024 09:00) (14 - 19)  SpO2: 98% (05 Nov 2024 09:00) (92% - 100%)    Parameters below as of 05 Nov 2024 09:00  Patient On (Oxygen Delivery Method): room air        I&O's Summary    04 Nov 2024 07:01  -  05 Nov 2024 07:00  --------------------------------------------------------  IN: 13616 mL / OUT: 34051 mL / NET: 310 mL    05 Nov 2024 07:01  -  05 Nov 2024 10:56  --------------------------------------------------------  IN: 910 mL / OUT: 800 mL / NET: 110 mL                              9.6    13.75 )-----------( 265      ( 05 Nov 2024 06:49 )             31.9     11-05    144  |  103  |  30[H]  ----------------------------<  121[H]  5.0   |  21[L]  |  3.04[H]    Ca    7.6[L]      05 Nov 2024 06:47  Phos  4.6     11-05  Mg     2.8     11-05    TPro  6.8  /  Alb  3.3  /  TBili  0.2  /  DBili  x   /  AST  18  /  ALT  6[L]  /  AlkPhos  51  11-05      Review of systems  Gen: No weight changes, fatigue, fevers/chills, weakness  Skin: No rashes  Head/Eyes/Ears/Mouth: No headache; Normal hearing; Normal vision w/o blurriness; No sinus pain/discomfort, sore throat  Respiratory: No dyspnea, cough, wheezing, hemoptysis  CV: No chest pain, PND, orthopnea  GI: C/O mild abdominal pain at surgical site, No diarrhea, constipation, nausea, vomiting, melena, hematochezia  : No increased frequency, dysuria, hematuria, nocturia  MSK: No joint pain/swelling; no back pain; no edema  Neuro: No dizziness/lightheadedness, weakness, seizures, numbness, tingling  Heme: No easy bruising or bleeding  Endo: No heat/cold intolerance  Psych: No significant nervousness, anxiety, stress, depression  All other systems were reviewed and are negative, except as noted.      PHYSICAL EXAM:  Constitutional: Well developed / well nourished  Eyes: Anicteric, PERRLA  ENMT: nc/at  Neck: supple  Respiratory: CTA B/L  Cardiovascular: RRR  Gastrointestinal: Soft, non distended, mild tenderness at the incision site; incision closed with stitches, c/d/i  Genitourinary: Urinary catheter in place  Extremities: SCD's in place and working bilaterally  Vascular: Palpable dp pulses bilaterally  Neurological: A&Ox4  Skin: no rashes, ulcerations or lesions;  Musculoskeletal: Moving all extremities  Psychiatric: Responsive

## 2024-11-05 NOTE — PROGRESS NOTE ADULT - SUBJECTIVE AND OBJECTIVE BOX
DIANA CANTU is a 34y Female s/p LDRT on 11/4/24. PMH is significant for 35yo female with advanced CKD since 10/ 2015, FSIG diagnosed in 2016 currently on Farxiga now s/p Open living donor kidney transplant recipient 11/04/2024    Allergies  Amoxicillin    CMV+/-  EBV -/-    Transplant Medications  Induction  -Thymoglobulin 1.5 mg/kg/dose x 4 doses (cumulative dose of 6 mg/kg)        Premedicate one hour prior to administration with                -Acetaminophen 650 mg PO/AR                -Diphenhydramine 50 mg IV/PO                -Steroid taper        Adjust dose for WBC < 3 or PLT < 75  -Methyprednisolone taper (switch to PO prednisone on POD 4)            POD 0: 500 mg IV in OR            POD 1: 125 mg IV Q12H            POD 2: 60 mg IV Q12H            POD 3: 30 mg IV Q12H        Maintenance Immunosuppression  -Tacrolimus XR (Envarsus XR) 0.14 mg/kg/dose Q24H at 8AM (Adjust for goal trough: 8-10)    -Mycophenolate 1,000 mg PO/IV Q12H  -Prednisone             POD 4: 20 mg PO Q12H            POD 5: 10 mg PO Q12H            POD 6: 5 mg PO Q12H            POD 7-: 5 mg daily     Anti-infection   -Bactrim SS tablet (frequency based on renal function)  -Valganciclovir (dose based on CMV serostatus and frequency based on renal function)  -Nystatin swish and swallow 5 mL four times daily      Surgical prophylaxis pre- and intra-operative dosing    -Vancomycin (PCN allergy)    Prophylaxis  -GI ppx: famotidine 20 mg daily  -Bowel ppx: senna  -DVT: sequential compression device  -Pain:            Mild: Acetaminophen 650 mg every 6 hours PRN           Moderate: Tramadol 25 mg every 4 hours PRN (adjust for renal function)           Severe: Tramadol 50 mg every 4 hours PRN (adjust for renal function)    Home Medications:  acetaminophen 325 mg oral tablet: 2 tab(s) orally every 6 hours as needed for  severe pain c/o recurrent back pain for a aweek (04 Nov 2024 06:33)  amLODIPine 10 mg oral tablet: 1 tab(s) orally once a day (04 Nov 2024 06:33)  atorvastatin 20 mg oral tablet: 1 tab(s) orally once a day (04 Nov 2024 06:33)  calcitriol 0.25 mcg oral capsule: 1 cap(s) orally once a day (04 Nov 2024 06:33)  Farxiga 10 mg oral tablet: 1 tab(s) orally once a day last dose 10/31 pre op (04 Nov 2024 06:33)  Ozempic 2 mg/1.5 mL (0.25 mg or 0.5 mg dose) subcutaneous solution: 0.5 milligram(s) subcutaneously once a week mondays last king 10/21 (04 Nov 2024 06:33)  VITAMIN D3 2000 mcg daily:  (04 Nov 2024 06:33)      Outpatient medication reconciliation reviewed and will be re-started appropriately.  Plan discussed with multidisciplinary team.     Laurie Bedolla, PharmD  PGY-1 Pharmacy Resident

## 2024-11-05 NOTE — PROGRESS NOTE ADULT - ASSESSMENT
33yo RHD female with advanced CKD since 10/ 2015, FSIG diagnosed in 2016 currently on Farxiga now s/p Open living donor kidney transplant recipient 11/04/2024.    OR: EBL 50, 1a/1v/1u+stent    [] LDRT POD #0  - Renal US   - Strict I/O's, Thomas  - NPO  - Maintnence IVF and 1:1 UOP repletions for any UOP > 70 mL/hr  - Pain management  - Bowel regimen  - SCD/PT/IS    [] Immunosuppression  - thymo induction  - Envarsus TBD, MMF 1/1, steroid taper  - ppx: nystatin, Bactrim, Valcyte     33yo RHD female with advanced CKD since 10/ 2015, FSIG diagnosed in 2016 currently on Farxiga now s/p Open living donor kidney transplant recipient 11/04/2024.    OR: EBL 50, 1a/1v/1u+stent    [] LDRT POD #1  - Renal US: patent   - Strict I/O's, Thomas  - advance diet   - SQH  -resume home lipitor (20mg)   - dc fluids   - Pain management  - Bowel regimen  - SCD/PT/IS  - urine cr/protein     [] Immunosuppression  - thymo induction  - Envarsus 5, MMF 1/1, steroid taper  - ppx: nystatin, Bactrim, Valcyte

## 2024-11-05 NOTE — PHYSICAL THERAPY INITIAL EVALUATION ADULT - PERTINENT HX OF CURRENT PROBLEM, REHAB EVAL
34 year old  RHD female  with advanced CKD since 10/ 2015 , FSIG diagnosed in 2016 currently on Farxiga . Presents for scheduled Open living donor kidney transplant recipient to the right side possible left on 11/04/2024.PMH: HTN, HLD, CKD -S/p right forearm AVF placed in 12/2024,   Obesity & KEN .  Patient has known CKD kidney biopsy done in 2015 ( at [facility]) with FSGS , was on steroids for 2-3 years, Currently on follow up with Nephro. Pt now s/p Open living donor kidney transplant recipient to the left side on 11/4. US trans kidney doppler Right: Well perfused renal transplant. No peritransplant fluid collection. Elevated velocities within the transplant renal artery at the anastomosis which may be seen in the immediate postoperative setting. No tardus parvus waveforms. Continued follow-up is recommended.

## 2024-11-05 NOTE — PHYSICAL THERAPY INITIAL EVALUATION ADULT - GAIT DEVIATIONS NOTED, PT EVAL
decreased aliza/increased time in double stance/decreased step length/decreased stride length/decreased weight-shifting ability

## 2024-11-06 LAB
ALBUMIN SERPL ELPH-MCNC: 3.2 G/DL — LOW (ref 3.3–5)
ALP SERPL-CCNC: 47 U/L — SIGNIFICANT CHANGE UP (ref 40–120)
ALT FLD-CCNC: 8 U/L — LOW (ref 10–45)
ANION GAP SERPL CALC-SCNC: 11 MMOL/L — SIGNIFICANT CHANGE UP (ref 5–17)
AST SERPL-CCNC: 17 U/L — SIGNIFICANT CHANGE UP (ref 10–40)
BASOPHILS # BLD AUTO: 0.01 K/UL — SIGNIFICANT CHANGE UP (ref 0–0.2)
BASOPHILS NFR BLD AUTO: 0.1 % — SIGNIFICANT CHANGE UP (ref 0–2)
BILIRUB SERPL-MCNC: 0.2 MG/DL — SIGNIFICANT CHANGE UP (ref 0.2–1.2)
BUN SERPL-MCNC: 33 MG/DL — HIGH (ref 7–23)
CALCIUM SERPL-MCNC: 8.1 MG/DL — LOW (ref 8.4–10.5)
CHLORIDE SERPL-SCNC: 105 MMOL/L — SIGNIFICANT CHANGE UP (ref 96–108)
CO2 SERPL-SCNC: 20 MMOL/L — LOW (ref 22–31)
CREAT ?TM UR-MCNC: 72 MG/DL — SIGNIFICANT CHANGE UP
CREAT SERPL-MCNC: 2.05 MG/DL — HIGH (ref 0.5–1.3)
EGFR: 32 ML/MIN/1.73M2 — LOW
EOSINOPHIL # BLD AUTO: 0 K/UL — SIGNIFICANT CHANGE UP (ref 0–0.5)
EOSINOPHIL NFR BLD AUTO: 0 % — SIGNIFICANT CHANGE UP (ref 0–6)
GLUCOSE BLDC GLUCOMTR-MCNC: 106 MG/DL — HIGH (ref 70–99)
GLUCOSE BLDC GLUCOMTR-MCNC: 112 MG/DL — HIGH (ref 70–99)
GLUCOSE BLDC GLUCOMTR-MCNC: 126 MG/DL — HIGH (ref 70–99)
GLUCOSE BLDC GLUCOMTR-MCNC: 141 MG/DL — HIGH (ref 70–99)
GLUCOSE BLDC GLUCOMTR-MCNC: 223 MG/DL — HIGH (ref 70–99)
GLUCOSE SERPL-MCNC: 116 MG/DL — HIGH (ref 70–99)
HAPTOGLOB SERPL-MCNC: 223 MG/DL — HIGH (ref 34–200)
HCT VFR BLD CALC: 27.9 % — LOW (ref 34.5–45)
HGB BLD-MCNC: 8.5 G/DL — LOW (ref 11.5–15.5)
IMM GRANULOCYTES NFR BLD AUTO: 0.4 % — SIGNIFICANT CHANGE UP (ref 0–0.9)
LYMPHOCYTES # BLD AUTO: 0.13 K/UL — LOW (ref 1–3.3)
LYMPHOCYTES # BLD AUTO: 1.9 % — LOW (ref 13–44)
MAGNESIUM SERPL-MCNC: 2.7 MG/DL — HIGH (ref 1.6–2.6)
MCHC RBC-ENTMCNC: 24.9 PG — LOW (ref 27–34)
MCHC RBC-ENTMCNC: 30.5 G/DL — LOW (ref 32–36)
MCV RBC AUTO: 81.6 FL — SIGNIFICANT CHANGE UP (ref 80–100)
MONOCYTES # BLD AUTO: 0.43 K/UL — SIGNIFICANT CHANGE UP (ref 0–0.9)
MONOCYTES NFR BLD AUTO: 6.3 % — SIGNIFICANT CHANGE UP (ref 2–14)
NEUTROPHILS # BLD AUTO: 6.25 K/UL — SIGNIFICANT CHANGE UP (ref 1.8–7.4)
NEUTROPHILS NFR BLD AUTO: 91.3 % — HIGH (ref 43–77)
NRBC # BLD: 0 /100 WBCS — SIGNIFICANT CHANGE UP (ref 0–0)
PHOSPHATE SERPL-MCNC: 3.7 MG/DL — SIGNIFICANT CHANGE UP (ref 2.5–4.5)
PLATELET # BLD AUTO: 213 K/UL — SIGNIFICANT CHANGE UP (ref 150–400)
POTASSIUM SERPL-MCNC: 4.7 MMOL/L — SIGNIFICANT CHANGE UP (ref 3.5–5.3)
POTASSIUM SERPL-SCNC: 4.7 MMOL/L — SIGNIFICANT CHANGE UP (ref 3.5–5.3)
PROT ?TM UR-MCNC: 99 MG/DL — HIGH (ref 0–12)
PROT SERPL-MCNC: 6.4 G/DL — SIGNIFICANT CHANGE UP (ref 6–8.3)
PROT/CREAT UR-RTO: 1.4 RATIO — HIGH (ref 0–0.2)
RBC # BLD: 3.42 M/UL — LOW (ref 3.8–5.2)
RBC # FLD: 15.8 % — HIGH (ref 10.3–14.5)
SODIUM SERPL-SCNC: 136 MMOL/L — SIGNIFICANT CHANGE UP (ref 135–145)
TACROLIMUS SERPL-MCNC: 4.5 NG/ML — SIGNIFICANT CHANGE UP
URATE SERPL-MCNC: 4 MG/DL — SIGNIFICANT CHANGE UP (ref 2.5–7)
WBC # BLD: 6.85 K/UL — SIGNIFICANT CHANGE UP (ref 3.8–10.5)
WBC # FLD AUTO: 6.85 K/UL — SIGNIFICANT CHANGE UP (ref 3.8–10.5)

## 2024-11-06 PROCEDURE — 99232 SBSQ HOSP IP/OBS MODERATE 35: CPT | Mod: GC

## 2024-11-06 RX ORDER — ACETAMINOPHEN 500 MG
650 TABLET ORAL ONCE
Refills: 0 | Status: COMPLETED | OUTPATIENT
Start: 2024-11-06 | End: 2024-11-06

## 2024-11-06 RX ORDER — METHYLPREDNISOLONE ACETATE 80 MG/ML
125 INJECTION, SUSPENSION INTRALESIONAL; INTRAMUSCULAR; INTRASYNOVIAL; SOFT TISSUE ONCE
Refills: 0 | Status: COMPLETED | OUTPATIENT
Start: 2024-11-06 | End: 2024-11-06

## 2024-11-06 RX ORDER — DIPHENHYDRAMINE HCL 12.5MG/5ML
50 ELIXIR ORAL ONCE
Refills: 0 | Status: DISCONTINUED | OUTPATIENT
Start: 2024-11-06 | End: 2024-11-06

## 2024-11-06 RX ORDER — DIPHENHYDRAMINE HCL 12.5MG/5ML
50 ELIXIR ORAL ONCE
Refills: 0 | Status: COMPLETED | OUTPATIENT
Start: 2024-11-06 | End: 2024-11-06

## 2024-11-06 RX ORDER — TACROLIMUS 0.5 MG/1
1 CAPSULE, GELATIN COATED ORAL ONCE
Refills: 0 | Status: COMPLETED | OUTPATIENT
Start: 2024-11-06 | End: 2024-11-06

## 2024-11-06 RX ORDER — VALGANCICLOVIR 450 MG/1
900 TABLET, FILM COATED ORAL
Refills: 0 | Status: DISCONTINUED | OUTPATIENT
Start: 2024-11-06 | End: 2024-11-08

## 2024-11-06 RX ORDER — TACROLIMUS 0.5 MG/1
6 CAPSULE, GELATIN COATED ORAL
Refills: 0 | Status: DISCONTINUED | OUTPATIENT
Start: 2024-11-07 | End: 2024-11-07

## 2024-11-06 RX ADMIN — FAMOTIDINE 20 MILLIGRAM(S): 10 INJECTION INTRAVENOUS at 12:28

## 2024-11-06 RX ADMIN — Medication 650 MILLIGRAM(S): at 14:54

## 2024-11-06 RX ADMIN — HEPARIN SODIUM 5000 UNIT(S): 10000 INJECTION INTRAVENOUS; SUBCUTANEOUS at 06:13

## 2024-11-06 RX ADMIN — Medication 500000 UNIT(S): at 06:13

## 2024-11-06 RX ADMIN — TRAMADOL HYDROCHLORIDE 25 MILLIGRAM(S): 50 TABLET, COATED ORAL at 14:59

## 2024-11-06 RX ADMIN — HEPARIN SODIUM 5000 UNIT(S): 10000 INJECTION INTRAVENOUS; SUBCUTANEOUS at 16:46

## 2024-11-06 RX ADMIN — Medication 500000 UNIT(S): at 16:46

## 2024-11-06 RX ADMIN — TACROLIMUS 5 MILLIGRAM(S): 0.5 CAPSULE, GELATIN COATED ORAL at 08:31

## 2024-11-06 RX ADMIN — Medication 20 MILLIGRAM(S): at 21:32

## 2024-11-06 RX ADMIN — MYCOPHENOLATE MOFETIL 1 GRAM(S): 500 TABLET, FILM COATED ORAL at 21:32

## 2024-11-06 RX ADMIN — Medication 1 TABLET(S): at 12:28

## 2024-11-06 RX ADMIN — Medication 50 MILLIGRAM(S): at 14:53

## 2024-11-06 RX ADMIN — METHYLPREDNISOLONE ACETATE 125 MILLIGRAM(S): 80 INJECTION, SUSPENSION INTRALESIONAL; INTRAMUSCULAR; INTRASYNOVIAL; SOFT TISSUE at 14:53

## 2024-11-06 RX ADMIN — CHLORHEXIDINE GLUCONATE 1 APPLICATION(S): 40 SOLUTION TOPICAL at 12:33

## 2024-11-06 RX ADMIN — Medication 500000 UNIT(S): at 23:52

## 2024-11-06 RX ADMIN — MYCOPHENOLATE MOFETIL 1 GRAM(S): 500 TABLET, FILM COATED ORAL at 08:32

## 2024-11-06 RX ADMIN — TACROLIMUS 1 MILLIGRAM(S): 0.5 CAPSULE, GELATIN COATED ORAL at 12:28

## 2024-11-06 RX ADMIN — VALGANCICLOVIR 900 MILLIGRAM(S): 450 TABLET, FILM COATED ORAL at 12:36

## 2024-11-06 RX ADMIN — POLYETHYLENE GLYCOL 3350 17 GRAM(S): 17 POWDER, FOR SOLUTION ORAL at 12:29

## 2024-11-06 RX ADMIN — Medication 500000 UNIT(S): at 12:28

## 2024-11-06 RX ADMIN — TRAMADOL HYDROCHLORIDE 25 MILLIGRAM(S): 50 TABLET, COATED ORAL at 13:59

## 2024-11-06 NOTE — DIETITIAN INITIAL EVALUATION ADULT - ADD RECOMMEND
1) Continue Regular diet. Encourage adequate PO intakes to promote surgical healing post-transplant.   2) RD will add PowerAde electrolyte drink to meal trays to help meet increased post-Txp fluid needs of ~3L.   3) Reinforce post-transplant nutrition therapy and food safety guidelines in-house and prior to discharge.   4) Discharge diet: Continue as above. Recommend follow up visit with Transplant MD and outpatient RD for dietary modifications as warranted.  5) Monitor PO intake,  Urine Output, GI tolerance, skin integrity,and labs. RD remains available if needed, pt is aware.

## 2024-11-06 NOTE — PROGRESS NOTE ADULT - SUBJECTIVE AND OBJECTIVE BOX
[facility] DIVISION OF KIDNEY DISEASES AND HYPERTENSION -- FOLLOW UP NOTE  --------------------------------------------------------------------------------  Chief Complaint:    24 hour events/subjective: Pt seen and evaluated bedside this morning. Reports feeling well, endorses no complaints. Denies any headaches, nausea, vomiting, fevers/chills, chest pain, palpitations, SOB, abdominal pain, and leg swelling.       PAST HISTORY  --------------------------------------------------------------------------------  No significant changes to PMH, PSH, FHx, SHx, unless otherwise noted    ALLERGIES & MEDICATIONS  --------------------------------------------------------------------------------  Allergies    amoxicillin (Angioedema; Urticaria)    Intolerances      Standing Inpatient Medications  acetaminophen     Tablet .. 650 milliGRAM(s) Oral once  antithymocyte globulin rabbit (peripheral) IVPB w/additives 150 milliGRAM(s) IV Intermittent once  atorvastatin 20 milliGRAM(s) Oral at bedtime  chlorhexidine 2% Cloths 1 Application(s) Topical daily  diphenhydrAMINE Elixir 50 milliGRAM(s) Oral once  famotidine    Tablet 20 milliGRAM(s) Oral daily  heparin   Injectable 5000 Unit(s) SubCutaneous every 12 hours  methylPREDNISolone sodium succinate Injectable 125 milliGRAM(s) IV Push once  mycophenolate mofetil 1 Gram(s) Oral every 12 hours  nystatin    Suspension 684032 Unit(s) Swish and Swallow four times a day  polyethylene glycol 3350 17 Gram(s) Oral daily  senna 2 Tablet(s) Oral at bedtime  tacrolimus ER Tablet (ENVARSUS XR) 1 milliGRAM(s) Oral once  trimethoprim   80 mG/sulfamethoxazole 400 mG 1 Tablet(s) Oral daily  valGANciclovir 900 milliGRAM(s) Oral <User Schedule>    PRN Inpatient Medications  ondansetron Injectable 4 milliGRAM(s) IV Push every 6 hours PRN  traMADol 25 milliGRAM(s) Oral every 8 hours PRN  traMADol 50 milliGRAM(s) Oral every 8 hours PRN      REVIEW OF SYSTEMS  --------------------------------------------------------------------------------  Gen: No fevers/chills  Respiratory: No dyspnea, cough,   CV: No chest pain, PND, orthopnea  GI: No abdominal pain, diarrhea, constipation, nausea, vomiting  Transplant: No pain  : No increased frequency, dysuria, hematuria   MSK: No edema  Neuro: No dizziness/lightheadedness    All other systems were reviewed and are negative, except as noted.    VITALS/PHYSICAL EXAM  --------------------------------------------------------------------------------  T(C): 36.4 (11-06-24 @ 09:00), Max: 37 (11-05-24 @ 13:00)  HR: 91 (11-06-24 @ 09:00) (80 - 96)  BP: 138/90 (11-06-24 @ 09:00) (118/76 - 145/85)  RR: 18 (11-06-24 @ 09:00) (18 - 18)  SpO2: 100% (11-06-24 @ 09:00) (94% - 100%)  Wt(kg): --        11-05-24 @ 07:01  -  11-06-24 @ 07:00  --------------------------------------------------------  IN: 3569.5 mL / OUT: 3890 mL / NET: -320.5 mL    11-06-24 @ 07:01  -  11-06-24 @ 10:59  --------------------------------------------------------  IN: 380 mL / OUT: 630 mL / NET: -250 mL      Physical Exam:  Gen: NAD, able to speak in full sentences   HEENT: PERRL, MMM   Pulm: CTA B/L, no crackles   CV: RRR, S1S2+  Abd: +BS, soft  Transplant: No tenderness, swelling  : No suprapubic tenderness  MSK: no edema   Psych: Normal affect and mood  Skin: Warm    LABS/STUDIES  --------------------------------------------------------------------------------              8.5    6.85  >-----------<  213      [11-06-24 @ 06:36]              27.9     136  |  105  |  33  ----------------------------<  116      [11-06-24 @ 06:37]  4.7   |  20  |  2.05        Ca     8.1     [11-06-24 @ 06:37]      Mg     2.7     [11-06-24 @ 06:37]      Phos  3.7     [11-06-24 @ 06:37]    TPro  6.4  /  Alb  3.2  /  TBili  0.2  /  DBili  x   /  AST  17  /  ALT  8   /  AlkPhos  47  [11-06-24 @ 06:37]    Uric acid 4.0      [11-06-24 @ 06:37]    Creatinine Trend:  SCr 2.05 [11-06 @ 06:37]  SCr 3.04 [11-05 @ 06:47]  SCr 4.07 [11-04 @ 21:35]  SCr 5.54 [11-04 @ 16:14]  SCr 6.09 [11-04 @ 07:05]    Tacrolimus (), Serum: 4.5 ng/mL (11-06 @ 06:36)    Urine Creatinine 72      [11-06-24 @ 06:45]  Urine Protein 99      [11-06-24 @ 06:45]    HBsAb 15.7      [11-04-24 @ 07:06]  HBsAg Nonreact      [11-04-24 @ 07:05]  HBcAb Nonreact      [11-04-24 @ 07:06]  HCV 0.05, Nonreact      [11-04-24 @ 07:05]  HIV Nonreact      [11-04-24 @ 07:06]

## 2024-11-06 NOTE — CONSULT NOTE ADULT - ASSESSMENT
34 year old  RHD female  with advanced CKD since 10/ 2015 , FSGS diagnosed in 2016 currently on Farxiga and Ozempic  Thymo induction   Metabolic acidosis(presumed)  Anemia       1 Renal- Serum creatinine coming down nicely at present;  Check UA and check proteinuria   Trend serum creatinine   Can the gasca be removed now   2 Transplant- Thymo induction and on Envarus and Cellcept   IV Solumederol   Bactrim for 9 months and Valcyte for 6 months   3 Anemia- Retacrit for anemia    [facility]   [phone] 
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 Patient with kidney biopsy done in 2015 ( at [facility]) with FSGS, was on steroids for 2-3 years, Patient underwent LDRT with renal transplant on 11/4/24, on the RLQ under steroid and Thymo induction.     1. s/p LDRT on 11/4/24 in the RLQ  Anatomy: 1V / 1A/ 1 U (stented)  Induction: steroid and Thymo induction.  Patient Scr ranged from 4.9 to 6.7 since Feb 2024. On admission, Scr elevated at 6.09, today Scr is at 3.04.   24H UOP: is 12L.   Urine protein noted to be elevated at >1000 mg/dl and UPCR elevated at 5.5 in 11/2023. Most recent Urine Protein was 300mg/dl and UPCR 3.0 from 9/2024.   Post txp Renal US showed well perfused renal transplant and no peritransplant fluid collection. No tardus parvus waveforms.   Check UPCR qd to trend the proteinuria     2. IS   Induction: steroid and Thymo induction.  Envarsus 5, MMF 1/1, steroid taper - Will continue to monitor tacro levels daily - 30 mins before the morning dose.   PPX: nystatin, Bactrim, Valcyte    3. HTN  patient was on olmesartan and amlodipine at home  Can hold BP meds as patient is POD1    4. HLD on statin    If you have any questions, please feel free to contact me  Maxwell Kaiser MD  Nephrology Fellow  Page [phone] / [employer] Teams (Preferred); Please PAGE for urgent consults only.  (After 5pm or on weekends please page the on-call fellow)

## 2024-11-06 NOTE — PROGRESS NOTE ADULT - SUBJECTIVE AND OBJECTIVE BOX
Transplant Surgery - Multidisciplinary Rounds   --------------------------------------------------------------   Tx Date: 11/4/2024        POD#2     Present: Patient seen and examined with multidisciplinary Transplant team including Surgeon Dr. Delgado, Nephrologist, Dr. Chau, Surgical/Hepatology fellow, JEFF Aguilar/Jude,  Pharmacist, Nutritionist,  and bedside RN during AM rounds.   Disciplines not in attendance will be notified of the plan.     HPI: 35yo RHD female with advanced CKD since 10/ 2015, FSIG diagnosed in 2016 currently on Farxiga now s/p Open living donor kidney transplant recipient 11/04/2024.    Interval Events:  - Afebrile, VSS  - tolerating reg diet  - good graft function  - UOP ~4L       Immunosupression:  Induction: thymo   Maintenance: FK/MMF/SST  Ongoing monitoring for signs of rejection     Potential Discharge date: TBD  Education:  Medications  Plan of care:  See Below      Review of systems  Gen: No weight changes, fatigue, fevers/chills, weakness  Skin: No rashes  Head/Eyes/Ears/Mouth: No headache; Normal hearing; Normal vision w/o blurriness; No sinus pain/discomfort, sore throat  Respiratory: No dyspnea, cough, wheezing, hemoptysis  CV: No chest pain, PND, orthopnea  GI: C/O mild abdominal pain at surgical site, No diarrhea, constipation, nausea, vomiting, melena, hematochezia  : No increased frequency, dysuria, hematuria, nocturia  MSK: No joint pain/swelling; no back pain; no edema  Neuro: No dizziness/lightheadedness, weakness, seizures, numbness, tingling  Heme: No easy bruising or bleeding  Endo: No heat/cold intolerance  Psych: No significant nervousness, anxiety, stress, depression  All other systems were reviewed and are negative, except as noted.      PHYSICAL EXAM:  Constitutional: Well developed / well nourished  Eyes: Anicteric, PERRLA  ENMT: nc/at  Neck: supple  Respiratory: CTA B/L  Cardiovascular: RRR  Gastrointestinal: Soft, non distended, mild tenderness at the incision site; incision closed with stitches, c/d/i  Genitourinary: Urinary catheter in place  Extremities: SCD's in place and working bilaterally  Vascular: Palpable dp pulses bilaterally  Neurological: A&Ox4  Skin: no rashes, ulcerations or lesions;  Musculoskeletal: Moving all extremities  Psychiatric: Responsive

## 2024-11-06 NOTE — DIETITIAN INITIAL EVALUATION ADULT - REASON FOR ADMISSION
Chart Reviewed, Events Noted  "35yo female  PMH is significant for  advanced CKD since 10/ 2015, FSIG diagnosed in 2016 currently on Farxiga now s/p Open living donor kidney transplant recipient 11/04/2024"

## 2024-11-06 NOTE — DIETITIAN INITIAL EVALUATION ADULT - PHYSCIAL ASSESSMENT
Weights:    Current Admission Weights:  - Dosing weight:  106.1 kg  ()  - Daily weight: Weight in k.5 (),Weight in k.5 ()      Weight History per [facility]:  - 108.9 kg (24)    Weight Change:  - Weight fluctuations in setting of fluid shifts (IVF, intraoperative fluids). Will continue to monitor weight trends as available/able.     IBW:140 pounds   %IBW: 177%

## 2024-11-06 NOTE — DIETITIAN INITIAL EVALUATION ADULT - PERTINENT MEDS FT
MEDICATIONS  (STANDING):  acetaminophen     Tablet .. 650 milliGRAM(s) Oral once  antithymocyte globulin rabbit (peripheral) IVPB w/additives 150 milliGRAM(s) IV Intermittent once  atorvastatin 20 milliGRAM(s) Oral at bedtime  chlorhexidine 2% Cloths 1 Application(s) Topical daily  diphenhydrAMINE Elixir 50 milliGRAM(s) Oral once  famotidine    Tablet 20 milliGRAM(s) Oral daily  heparin   Injectable 5000 Unit(s) SubCutaneous every 12 hours  methylPREDNISolone sodium succinate Injectable 125 milliGRAM(s) IV Push once  mycophenolate mofetil 1 Gram(s) Oral every 12 hours  nystatin    Suspension 848854 Unit(s) Swish and Swallow four times a day  polyethylene glycol 3350 17 Gram(s) Oral daily  senna 2 Tablet(s) Oral at bedtime  tacrolimus ER Tablet (ENVARSUS XR) 5 milliGRAM(s) Oral <User Schedule>  trimethoprim   80 mG/sulfamethoxazole 400 mG 1 Tablet(s) Oral daily  valGANciclovir 900 milliGRAM(s) Oral <User Schedule>    MEDICATIONS  (PRN):  ondansetron Injectable 4 milliGRAM(s) IV Push every 6 hours PRN Nausea and/or Vomiting  traMADol 25 milliGRAM(s) Oral every 8 hours PRN Moderate Pain (4 - 6)  traMADol 50 milliGRAM(s) Oral every 8 hours PRN Severe Pain (7 - 10)

## 2024-11-06 NOTE — CONSULT NOTE ADULT - SUBJECTIVE AND OBJECTIVE BOX
[facility] DIVISION OF KIDNEY DISEASES AND HYPERTENSION -- INITIAL CONSULT NOTE  --------------------------------------------------------------------------------    HPI: 34 year old female with CKD (2/2 FSGS) and HTN. Patient with kidney biopsy done in 2015 ( at [facility]) with FSGS, was on steroids for 2-3 years, Patient underwent LDRT with renal transplant on 11/4/24, on the RLQ under steroid and Thymo induction.     Transplant hx:  Anatomy: 1V / 1A/ 1 U (stented)  Induction: steroid and Thymo induction.  Envarsus 5, MMF 1/1, steroid taper  PPX: nystatin, Bactrim, Valcyte    On review of [facility] HIE/[place], patient Scr ranged from 4.9 to 6.7 since Feb 2024. On admission, Scr elevated at 6.09, today Scr is at 3.04. 24H UOP: is 12L. Urine protein noted to be elevated at >1000 mg/dl and UPCR elevated at 5.5 in 11/2023. Most recent Urine Protein was 300mg/dl and UPCR 3.0 from 9/2024. Post txp Renal US showed well perfused renal transplant and no peritransplant fluid collection. No tardus parvus waveforms.     Pt seen and evaluated bedside this morning. Reports feeling well, endorses no complaints. Denies any headaches, nausea, vomiting, fevers/chills, chest pain, palpitations, SOB, abdominal pain, and leg swelling.     PAST HISTORY  --------------------------------------------------------------------------------  PAST MEDICAL & SURGICAL HISTORY:  FSGS (focal segmental glomerulosclerosis)  CKD (chronic kidney disease)  Back pain  KEN (obstructive sleep apnea)  S/P arteriovenous (AV) fistula creation    FAMILY HISTORY:  Family history of hypertension (Father, Mother)    Family history of diabetes mellitus (Mother)    Social History:    ALLERGIES & MEDICATIONS  --------------------------------------------------------------------------------  Allergies    amoxicillin (Angioedema; Urticaria)    Intolerances      Standing Inpatient Medications  antithymocyte globulin rabbit (peripheral) IVPB w/additives 150 milliGRAM(s) IV Intermittent once  atorvastatin 20 milliGRAM(s) Oral at bedtime  chlorhexidine 2% Cloths 1 Application(s) Topical daily  famotidine    Tablet 20 milliGRAM(s) Oral daily  heparin   Injectable 5000 Unit(s) SubCutaneous every 12 hours  multiple electrolytes Injection Type 1 1000 milliLiter(s) IV Continuous <Continuous>  mycophenolate mofetil 1 Gram(s) Oral every 12 hours  nystatin    Suspension 486046 Unit(s) Swish and Swallow four times a day  senna 2 Tablet(s) Oral at bedtime  trimethoprim   80 mG/sulfamethoxazole 400 mG 1 Tablet(s) Oral daily  valGANciclovir 450 milliGRAM(s) Oral daily    PRN Inpatient Medications  ondansetron Injectable 4 milliGRAM(s) IV Push every 6 hours PRN  traMADol 25 milliGRAM(s) Oral every 8 hours PRN  traMADol 50 milliGRAM(s) Oral every 8 hours PRN      REVIEW OF SYSTEMS  --------------------------------------------------------------------------------  Gen: No fatigue, fevers/chills  Skin: No rashes  Head/Eyes/Ears/Mouth: No headache; Normal hearing; Normal vision   Respiratory: No dyspnea, cough  CV: No chest pain, PND, orthopnea  GI: No abdominal pain, diarrhea, constipation, nausea, vomiting  : No increased frequency, dysuria, hematuria, nocturia  MSK: No edema  Neuro: No dizziness/lightheadedness  Heme: No bruising or bleeding  Psych: No significant nervousness, anxiety, stress, depression    Rest of the ROS as stated in HPI    VITALS/PHYSICAL EXAM  --------------------------------------------------------------------------------  T(C): 36.3 (11-05-24 @ 09:00), Max: 36.7 (11-05-24 @ 01:00)  HR: 94 (11-05-24 @ 10:30) (84 - 110)  BP: 130/82 (11-05-24 @ 10:30) (119/56 - 152/81)  RR: 16 (11-05-24 @ 09:00) (14 - 19)  SpO2: 98% (11-05-24 @ 10:30) (92% - 100%)  Wt(kg): --  Height (cm): 172.7 (11-04-24 @ 07:46)  Weight (kg): 106.1 (11-04-24 @ 07:46)  BMI (kg/m2): 35.6 (11-04-24 @ 07:46)  BSA (m2): 2.18 (11-04-24 @ 07:46)      11-04-24 @ 07:01  -  11-05-24 @ 07:00  --------------------------------------------------------  IN: 03854 mL / OUT: 37330 mL / NET: 310 mL    11-05-24 @ 07:01  -  11-05-24 @ 12:33  --------------------------------------------------------  IN: 1220 mL / OUT: 950 mL / NET: 270 mL      Physical Exam:  Gen: NAD, able to speak in full sentences   HEENT: PERRL, MMM   Pulm: CTA B/L, no crackles   CV: RRR, S1S2+  Abd: +BS, soft  Transplant: No tenderness, swelling  : No suprapubic tenderness  MSK: no edema   Psych: Normal affect and mood  Skin: Warm  Access:    LABS/STUDIES  --------------------------------------------------------------------------------              9.6    13.75 >-----------<  265      [11-05-24 @ 06:49]              31.9     144  |  103  |  30  ----------------------------<  121      [11-05-24 @ 06:47]  5.0   |  21  |  3.04        Ca     7.6     [11-05-24 @ 06:47]      Mg     2.8     [11-05-24 @ 06:47]      Phos  4.6     [11-05-24 @ 06:47]    TPro  6.8  /  Alb  3.3  /  TBili  0.2  /  DBili  x   /  AST  18  /  ALT  6   /  AlkPhos  51  [11-05-24 @ 06:47]    PT/INR: PT 11.0 , INR 0.96       [11-04-24 @ 07:05]  PTT: 32.1       [11-04-24 @ 07:05]      Creatinine Trend:  SCr 3.04 [11-05 @ 06:47]  SCr 4.07 [11-04 @ 21:35]  SCr 5.54 [11-04 @ 16:14]  SCr 6.09 [11-04 @ 07:05]    Urinalysis - [11-05-24 @ 06:47]      Color  / Appearance  / SG  / pH       Gluc 121 / Ketone   / Bili  / Urobili        Blood  / Protein  / Leuk Est  / Nitrite       RBC  / WBC  / Hyaline  / Gran  / Sq Epi  / Non Sq Epi  / Bacteria         HBsAb 15.7      [11-04-24 @ 07:06]  HBsAg Nonreact      [11-04-24 @ 07:05]  HBcAb Nonreact      [11-04-24 @ 07:06]  HCV 0.05, Nonreact      [11-04-24 @ 07:05]  HIV Nonreact      [11-04-24 @ 07:06]      Tacrolimus  Cyclosporine  Sirolimus  Mycophenolate  BK PCR  CMV PCR  Parvo PCR  EBV PCR
NEPHROLOGY - NSN    Patient seen and examined.    HPI:  34 year old  RHD female  with advanced CKD since 10/ 2015 , FSIG diagnosed in 2016 currently on Farxiga . Presents for scheduled Open living donor kidney transplant recipient to the right side possible left on 11/04/2024.     PMH: HTN, HLD, CKD -S/p right forearm AVF placed in 12/2024,   Obesity & KEN .   Patient has known CKD kidney biopsy done in 2015 ( at [facility]) with FSGS , was on steroids for 2-3 years    Transplant hx:  Anatomy: 1V / 1A/ 1 U (stented)  Induction: steroid and Thymo induction.  Envarsus 5, MMF 1/1, steroid taper  PPX: nystatin, Bactrim, Valcyte    There is no hematuria or bubbles in the urine.  No history of NSAIDS or nephrolithisis.  The patient urinates once or twice in the night and there is no incontinence.  No family hx or renal disease or back pain.    No recent abx use.  No alleviating or aggravating factors with respect to the kidneys.     PAST MEDICAL & SURGICAL HISTORY:  FSGS (focal segmental glomerulosclerosis)      CKD (chronic kidney disease)      Back pain      KEN (obstructive sleep apnea)      S/P arteriovenous (AV) fistula creation          MEDICATIONS  (STANDING):  acetaminophen     Tablet .. 650 milliGRAM(s) Oral once  antithymocyte globulin rabbit (peripheral) IVPB w/additives 150 milliGRAM(s) IV Intermittent once  atorvastatin 20 milliGRAM(s) Oral at bedtime  chlorhexidine 2% Cloths 1 Application(s) Topical daily  diphenhydrAMINE Elixir 50 milliGRAM(s) Oral once  famotidine    Tablet 20 milliGRAM(s) Oral daily  heparin   Injectable 5000 Unit(s) SubCutaneous every 12 hours  methylPREDNISolone sodium succinate Injectable 125 milliGRAM(s) IV Push once  mycophenolate mofetil 1 Gram(s) Oral every 12 hours  nystatin    Suspension 476625 Unit(s) Swish and Swallow four times a day  polyethylene glycol 3350 17 Gram(s) Oral daily  senna 2 Tablet(s) Oral at bedtime  tacrolimus ER Tablet (ENVARSUS XR) 5 milliGRAM(s) Oral <User Schedule>  trimethoprim   80 mG/sulfamethoxazole 400 mG 1 Tablet(s) Oral daily  valGANciclovir 900 milliGRAM(s) Oral <User Schedule>      Allergies    amoxicillin (Angioedema; Urticaria)    Intolerances        SOCIAL HISTORY:  Denies alcohol abuse, drug abuse or tobacco usage.     FAMILY HISTORY:  Family history of hypertension (Father, Mother)    Family history of diabetes mellitus (Mother)        VITALS:  T(C): 36.4 (11-06-24 @ 09:00), Max: 37 (11-05-24 @ 13:00)  HR: 91 (11-06-24 @ 09:00) (80 - 96)  BP: 138/90 (11-06-24 @ 09:00) (118/76 - 145/85)  RR: 18 (11-06-24 @ 09:00) (18 - 18)  SpO2: 100% (11-06-24 @ 09:00) (94% - 100%)    REVIEW OF SYSTEMS:  Denies any nausea, vomiting, diarrhea, fever or chills. Denies chest pain, SOB, focal weakness, hematuria or dysuria. Good oral intake and denies fatigue or weakness. All other pertinent systems are reviewed and are negative.    PHYSICAL EXAM:  Constitutional: NAD  HEENT: EOMI  Neck:  No JVD, supple   Respiratory: CTA B/L  Cardiovascular: S1 and S2, RRR  Gastrointestinal: + BS, soft, NT, ND  Extremities: No peripheral edema, + peripheral pulses  Neurological: A/O x 3, CN2-12 intact  Psychiatric: Normal mood, normal affect  : No Thomas  Skin: No rashes, C/D/I  Access: Not applicable    I and O's:    11-04 @ 07:01  -  11-05 @ 07:00  --------------------------------------------------------  IN: 87391 mL / OUT: 55718 mL / NET: 310 mL    11-05 @ 07:01  -  11-06 @ 07:00  --------------------------------------------------------  IN: 3569.5 mL / OUT: 3890 mL / NET: -320.5 mL    11-06 @ 07:01  -  11-06 @ 10:09  --------------------------------------------------------  IN: 380 mL / OUT: 350 mL / NET: 30 mL          LABS:                        8.5    6.85  )-----------( 213      ( 06 Nov 2024 06:36 )             27.9     11-06    136  |  105  |  33[H]  ----------------------------<  116[H]  4.7   |  20[L]  |  2.05[H]    Ca    8.1[L]      06 Nov 2024 06:37  Phos  3.7     11-06  Mg     2.7     11-06    TPro  6.4  /  Alb  3.2[L]  /  TBili  0.2  /  DBili  x   /  AST  17  /  ALT  8[L]  /  AlkPhos  47  11-06      URINE:  Urinalysis Basic - ( 06 Nov 2024 06:37 )    Color: x / Appearance: x / SG: x / pH: x  Gluc: 116 mg/dL / Ketone: x  / Bili: x / Urobili: x   Blood: x / Protein: x / Nitrite: x   Leuk Esterase: x / RBC: x / WBC x   Sq Epi: x / Non Sq Epi: x / Bacteria: x      Creatinine, Random Urine: 72 mg/dL (11-06 @ 06:45)  Protein/Creatinine Ratio Calculation: 1.4 Ratio (11-06 @ 06:45)    RADIOLOGY & ADDITIONAL STUDIES:

## 2024-11-06 NOTE — DIETITIAN INITIAL EVALUATION ADULT - OTHER INFO
Pt s/p LDRT 11/4/2024 POD#2:  -- Tacrolimus, Cellcept transplant meds ordered  -- BG management: Ordered for Solu-medrol for post-transplant steroid regimen; currently not ordered for any anti-hyperglycemic medications   -- Urine output per flow sheets 350 ml thus far (11/6), 3,890 ml (11/5), 12,690 ml (11/4)

## 2024-11-06 NOTE — DIETITIAN INITIAL EVALUATION ADULT - PERTINENT LABORATORY DATA
11-06    136  |  105  |  33[H]  ----------------------------<  116[H]  4.7   |  20[L]  |  2.05[H]    Ca    8.1[L]      06 Nov 2024 06:37  Phos  3.7     11-06  Mg     2.7     11-06    TPro  6.4  /  Alb  3.2[L]  /  TBili  0.2  /  DBili  x   /  AST  17  /  ALT  8[L]  /  AlkPhos  47  11-06  POCT Blood Glucose.: 106 mg/dL (11-06-24 @ 08:51)

## 2024-11-06 NOTE — DIETITIAN INITIAL EVALUATION ADULT - ORAL INTAKE PTA/DIET HISTORY
Pt confirms no known food allergies/intolerances. Reports having a good appetite and PO intakes at home. Was taking Ozempic for weight loss. Did not follow any specific dietary restrictions. Pt denies nausea, vomiting, diarrhea, or constipation. Denies difficulty chewing/swallowing. Denies any vitamin/mineral use at home, pt reports not taking oral nutritional supplement at home as well.

## 2024-11-06 NOTE — DIETITIAN INITIAL EVALUATION ADULT - EDUCATION DIETARY MODIFICATIONS
Provided education on post transplant nutrition therapy and food safety guidelines for transplant recipients. Discussed importance of thoroughly washing all fresh fruits/vegetables, importance of avoiding uncooked/raw/unpasteurized foods, avoiding pre-made deli/buffet/salad bar meals. Foods recommended as healthy well balanced diet and importance of adequate protein intakes for proper post-surgical healing discussed. Reviewed recommendations to avoid grapefruit, pomegranate and star fruit while taking immunosuppressant medication. Reviewed recommendations for moderate intake of sodium and carbohydrates with transplant medications. Reviewed effect of steroids on BG levels and importance of limiting concentrated sweets. Pt was receptive and expressed understanding.All questions answered./teach back/(1) partially meets; needs review/practice

## 2024-11-06 NOTE — PROGRESS NOTE ADULT - ASSESSMENT
33yo RHD female with advanced CKD since 10/ 2015, FSIG diagnosed in 2016 currently on Farxiga now s/p Open living donor kidney transplant recipient 11/04/2024.    OR: EBL 50, 1a/1v/1u+stent    [] LDRT POD #2   - Renal US: patent   - Strict I/O's, Thomas  - Regular Diet    - SQH  - c/w lipitor 20mg  - dc fluids   - Pain management  - Bowel regimen  - SCD/PT/IS  - check daily urine cr/protein     [] Immunosuppression  - thymo induction  - Envarsus per level , MMF 1/1, steroid taper  - ppx: nystatin, Bactrim, Valcyte

## 2024-11-06 NOTE — DIETITIAN INITIAL EVALUATION ADULT - LITERATURE/VIDEOS GIVEN
Provided nutrition package including: USDA Food Safety for Transplant Recipients booklet; food safety and BG control handouts.

## 2024-11-06 NOTE — PROGRESS NOTE ADULT - ASSESSMENT
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 Patient with kidney biopsy done in 2015 ( at [facility]) with FSGS, was on steroids for 2-3 years, Patient underwent LDRT with renal transplant on 11/4/24, on the RLQ under steroid and Thymo induction.     1. s/p LDRT on 11/4/24 in the RLQ  Anatomy: 1V / 1A/ 1 U (stented)  Induction: steroid and Thymo induction.  Patient Scr ranged from 4.9 to 6.7 since Feb 2024. On admission, Scr elevated at 6.09, today Scr is at 2.05.   24H UOP: is 4L.   Urine protein noted to be elevated at >1000 mg/dl and UPCR elevated at 5.5 in 11/2023. Most recent Urine Protein was 300mg/dl and UPCR 3.0 from 9/2024. UPCR 1.4 on 11/5.  Post txp Renal US showed well perfused renal transplant and no peritransplant fluid collection. No tardus parvus waveforms.   Stop IV Fluids and continue PO intake     2. IS   Induction: steroid and Thymo induction.  Envarsus 1, MMF 1/1, steroid taper - tacro level today is 4.5. Will continue to monitor tacro levels daily - 30 mins before the morning dose.   PPX: nystatin, Bactrim, Valcyte    3. HTN  patient was on olmesartan and amlodipine at home  Can hold BP meds as patient is POD2    4. HLD on statin    If you have any questions, please feel free to contact me  Maxwell Kaiser MD  Nephrology Fellow  Page [phone] / [employer] Teams (Preferred); Please PAGE for urgent consults only.  (After 5pm or on weekends please page the on-call fellow)

## 2024-11-07 LAB
ALBUMIN SERPL ELPH-MCNC: 3.4 G/DL — SIGNIFICANT CHANGE UP (ref 3.3–5)
ALP SERPL-CCNC: 43 U/L — SIGNIFICANT CHANGE UP (ref 40–120)
ALT FLD-CCNC: 6 U/L — LOW (ref 10–45)
ANION GAP SERPL CALC-SCNC: 10 MMOL/L — SIGNIFICANT CHANGE UP (ref 5–17)
AST SERPL-CCNC: 12 U/L — SIGNIFICANT CHANGE UP (ref 10–40)
BASOPHILS # BLD AUTO: 0 K/UL — SIGNIFICANT CHANGE UP (ref 0–0.2)
BASOPHILS NFR BLD AUTO: 0 % — SIGNIFICANT CHANGE UP (ref 0–2)
BILIRUB SERPL-MCNC: 0.2 MG/DL — SIGNIFICANT CHANGE UP (ref 0.2–1.2)
BUN SERPL-MCNC: 30 MG/DL — HIGH (ref 7–23)
CALCIUM SERPL-MCNC: 8.4 MG/DL — SIGNIFICANT CHANGE UP (ref 8.4–10.5)
CHLORIDE SERPL-SCNC: 106 MMOL/L — SIGNIFICANT CHANGE UP (ref 96–108)
CO2 SERPL-SCNC: 20 MMOL/L — LOW (ref 22–31)
CREAT ?TM UR-MCNC: 62 MG/DL — SIGNIFICANT CHANGE UP
CREAT SERPL-MCNC: 1.56 MG/DL — HIGH (ref 0.5–1.3)
EGFR: 45 ML/MIN/1.73M2 — LOW
EOSINOPHIL # BLD AUTO: 0 K/UL — SIGNIFICANT CHANGE UP (ref 0–0.5)
EOSINOPHIL NFR BLD AUTO: 0 % — SIGNIFICANT CHANGE UP (ref 0–6)
GLUCOSE BLDC GLUCOMTR-MCNC: 103 MG/DL — HIGH (ref 70–99)
GLUCOSE BLDC GLUCOMTR-MCNC: 113 MG/DL — HIGH (ref 70–99)
GLUCOSE BLDC GLUCOMTR-MCNC: 177 MG/DL — HIGH (ref 70–99)
GLUCOSE BLDC GLUCOMTR-MCNC: 226 MG/DL — HIGH (ref 70–99)
GLUCOSE SERPL-MCNC: 115 MG/DL — HIGH (ref 70–99)
HAPTOGLOB SERPL-MCNC: 258 MG/DL — HIGH (ref 34–200)
HCT VFR BLD CALC: 27.9 % — LOW (ref 34.5–45)
HGB BLD-MCNC: 8.6 G/DL — LOW (ref 11.5–15.5)
IMM GRANULOCYTES NFR BLD AUTO: 0.7 % — SIGNIFICANT CHANGE UP (ref 0–0.9)
LYMPHOCYTES # BLD AUTO: 0.08 K/UL — LOW (ref 1–3.3)
LYMPHOCYTES # BLD AUTO: 2.8 % — LOW (ref 13–44)
MAGNESIUM SERPL-MCNC: 2.5 MG/DL — SIGNIFICANT CHANGE UP (ref 1.6–2.6)
MCHC RBC-ENTMCNC: 24.8 PG — LOW (ref 27–34)
MCHC RBC-ENTMCNC: 30.8 G/DL — LOW (ref 32–36)
MCV RBC AUTO: 80.4 FL — SIGNIFICANT CHANGE UP (ref 80–100)
MONOCYTES # BLD AUTO: 0.19 K/UL — SIGNIFICANT CHANGE UP (ref 0–0.9)
MONOCYTES NFR BLD AUTO: 6.6 % — SIGNIFICANT CHANGE UP (ref 2–14)
NEUTROPHILS # BLD AUTO: 2.59 K/UL — SIGNIFICANT CHANGE UP (ref 1.8–7.4)
NEUTROPHILS NFR BLD AUTO: 89.9 % — HIGH (ref 43–77)
NRBC # BLD: 0 /100 WBCS — SIGNIFICANT CHANGE UP (ref 0–0)
PHOSPHATE SERPL-MCNC: 2.8 MG/DL — SIGNIFICANT CHANGE UP (ref 2.5–4.5)
PLATELET # BLD AUTO: 185 K/UL — SIGNIFICANT CHANGE UP (ref 150–400)
POTASSIUM SERPL-MCNC: 5.1 MMOL/L — SIGNIFICANT CHANGE UP (ref 3.5–5.3)
POTASSIUM SERPL-SCNC: 5.1 MMOL/L — SIGNIFICANT CHANGE UP (ref 3.5–5.3)
PROT ?TM UR-MCNC: 121 MG/DL — HIGH (ref 0–12)
PROT SERPL-MCNC: 6.4 G/DL — SIGNIFICANT CHANGE UP (ref 6–8.3)
PROT/CREAT UR-RTO: 2 RATIO — HIGH (ref 0–0.2)
RBC # BLD: 3.47 M/UL — LOW (ref 3.8–5.2)
RBC # FLD: 15.7 % — HIGH (ref 10.3–14.5)
SODIUM SERPL-SCNC: 136 MMOL/L — SIGNIFICANT CHANGE UP (ref 135–145)
TACROLIMUS SERPL-MCNC: 4.1 NG/ML — SIGNIFICANT CHANGE UP
URATE SERPL-MCNC: 3.6 MG/DL — SIGNIFICANT CHANGE UP (ref 2.5–7)
WBC # BLD: 2.88 K/UL — LOW (ref 3.8–10.5)
WBC # FLD AUTO: 2.88 K/UL — LOW (ref 3.8–10.5)

## 2024-11-07 PROCEDURE — 99232 SBSQ HOSP IP/OBS MODERATE 35: CPT | Mod: GC

## 2024-11-07 RX ORDER — METHYLPREDNISOLONE ACETATE 80 MG/ML
60 INJECTION, SUSPENSION INTRALESIONAL; INTRAMUSCULAR; INTRASYNOVIAL; SOFT TISSUE ONCE
Refills: 0 | Status: COMPLETED | OUTPATIENT
Start: 2024-11-07 | End: 2024-11-07

## 2024-11-07 RX ORDER — TACROLIMUS 0.5 MG/1
7 CAPSULE, GELATIN COATED ORAL
Refills: 0 | Status: DISCONTINUED | OUTPATIENT
Start: 2024-11-08 | End: 2024-11-08

## 2024-11-07 RX ORDER — TACROLIMUS 0.5 MG/1
1 CAPSULE, GELATIN COATED ORAL ONCE
Refills: 0 | Status: COMPLETED | OUTPATIENT
Start: 2024-11-07 | End: 2024-11-07

## 2024-11-07 RX ADMIN — MYCOPHENOLATE MOFETIL 1 GRAM(S): 500 TABLET, FILM COATED ORAL at 08:57

## 2024-11-07 RX ADMIN — MYCOPHENOLATE MOFETIL 1 GRAM(S): 500 TABLET, FILM COATED ORAL at 21:17

## 2024-11-07 RX ADMIN — METHYLPREDNISOLONE ACETATE 60 MILLIGRAM(S): 80 INJECTION, SUSPENSION INTRALESIONAL; INTRAMUSCULAR; INTRASYNOVIAL; SOFT TISSUE at 13:39

## 2024-11-07 RX ADMIN — TACROLIMUS 1 MILLIGRAM(S): 0.5 CAPSULE, GELATIN COATED ORAL at 13:39

## 2024-11-07 RX ADMIN — Medication 500000 UNIT(S): at 17:26

## 2024-11-07 RX ADMIN — Medication 20 MILLIGRAM(S): at 21:17

## 2024-11-07 RX ADMIN — HEPARIN SODIUM 5000 UNIT(S): 10000 INJECTION INTRAVENOUS; SUBCUTANEOUS at 05:42

## 2024-11-07 RX ADMIN — Medication 500000 UNIT(S): at 23:54

## 2024-11-07 RX ADMIN — TACROLIMUS 6 MILLIGRAM(S): 0.5 CAPSULE, GELATIN COATED ORAL at 08:57

## 2024-11-07 RX ADMIN — Medication 500000 UNIT(S): at 05:41

## 2024-11-07 NOTE — PROGRESS NOTE ADULT - SUBJECTIVE AND OBJECTIVE BOX
Transplant Surgery - Multidisciplinary Rounds   --------------------------------------------------------------   Tx Date: 11/4/2024        POD#3     Present: Patient seen and examined with multidisciplinary Transplant team including Surgeon Dr. Delgado, Nephrologist, Dr. Chau, Surgical/Hepatology fellow, JEFF Roque,  Pharmacist, Nutritionist,  and bedside RN during AM rounds.   Disciplines not in attendance will be notified of the plan.     HPI: 33yo RHD female with advanced CKD since 10/ 2015, FSIG diagnosed in 2016 currently on Farxiga now s/p Open living donor kidney transplant recipient 11/04/2024.    Interval Events:  - Afebrile, VSS  - tolerating reg diet/ having bowel function   - good graft function  - UOP ~3.4 L     Immunosupression:  Induction: thymo   Maintenance: FK per level /MMF/SST  Ongoing monitoring for signs of rejection     Potential Discharge date: TBD  Education:  Medications  Plan of care:  See Below      MEDICATIONS  (STANDING):  atorvastatin 20 milliGRAM(s) Oral at bedtime  chlorhexidine 2% Cloths 1 Application(s) Topical daily  famotidine    Tablet 20 milliGRAM(s) Oral daily  heparin   Injectable 5000 Unit(s) SubCutaneous every 12 hours  mycophenolate mofetil 1 Gram(s) Oral every 12 hours  nystatin    Suspension 679628 Unit(s) Swish and Swallow four times a day  polyethylene glycol 3350 17 Gram(s) Oral daily  senna 2 Tablet(s) Oral at bedtime  trimethoprim   80 mG/sulfamethoxazole 400 mG 1 Tablet(s) Oral daily  valGANciclovir 900 milliGRAM(s) Oral <User Schedule>    MEDICATIONS  (PRN):  ondansetron Injectable 4 milliGRAM(s) IV Push every 6 hours PRN Nausea and/or Vomiting  traMADol 25 milliGRAM(s) Oral every 8 hours PRN Moderate Pain (4 - 6)  traMADol 50 milliGRAM(s) Oral every 8 hours PRN Severe Pain (7 - 10)      PAST MEDICAL & SURGICAL HISTORY:  FSGS (focal segmental glomerulosclerosis)      CKD (chronic kidney disease)      Back pain      KEN (obstructive sleep apnea)      S/P arteriovenous (AV) fistula creation          Vital Signs Last 24 Hrs  T(C): 36.5 (07 Nov 2024 13:00), Max: 36.9 (06 Nov 2024 17:00)  T(F): 97.7 (07 Nov 2024 13:00), Max: 98.4 (06 Nov 2024 17:00)  HR: 92 (07 Nov 2024 13:00) (76 - 92)  BP: 142/94 (07 Nov 2024 13:00) (131/84 - 147/91)  BP(mean): 114 (07 Nov 2024 05:55) (96 - 114)  RR: 18 (07 Nov 2024 13:00) (16 - 18)  SpO2: 97% (07 Nov 2024 13:00) (95% - 99%)    Parameters below as of 07 Nov 2024 13:00  Patient On (Oxygen Delivery Method): room air        I&O's Summary    06 Nov 2024 07:01  -  07 Nov 2024 07:00  --------------------------------------------------------  IN: 1600.2 mL / OUT: 3380 mL / NET: -1779.8 mL    07 Nov 2024 07:01  -  07 Nov 2024 13:44  --------------------------------------------------------  IN: 780 mL / OUT: 625 mL / NET: 155 mL                              8.6    2.88  )-----------( 185      ( 07 Nov 2024 07:18 )             27.9     11-07    136  |  106  |  30[H]  ----------------------------<  115[H]  5.1   |  20[L]  |  1.56[H]    Ca    8.4      07 Nov 2024 07:11  Phos  2.8     11-07  Mg     2.5     11-07    TPro  6.4  /  Alb  3.4  /  TBili  0.2  /  DBili  x   /  AST  12  /  ALT  6[L]  /  AlkPhos  43  11-07    Tacrolimus (), Serum: 4.1 ng/mL (11-07 @ 07:11)            Review of systems  Gen: No weight changes, fatigue, fevers/chills, weakness  Skin: No rashes  Head/Eyes/Ears/Mouth: No headache; Normal hearing; Normal vision w/o blurriness; No sinus pain/discomfort, sore throat  Respiratory: No dyspnea, cough, wheezing, hemoptysis  CV: No chest pain, PND, orthopnea  GI: C/O mild abdominal pain at surgical site, No diarrhea, constipation, nausea, vomiting, melena, hematochezia  : No increased frequency, dysuria, hematuria, nocturia  MSK: No joint pain/swelling; no back pain; no edema  Neuro: No dizziness/lightheadedness, weakness, seizures, numbness, tingling  Heme: No easy bruising or bleeding  Endo: No heat/cold intolerance  Psych: No significant nervousness, anxiety, stress, depression  All other systems were reviewed and are negative, except as noted.      PHYSICAL EXAM:  Constitutional: Well developed / well nourished  Eyes: Anicteric, PERRLA  ENMT: nc/at  Neck: supple  Respiratory: CTA B/L  Cardiovascular: RRR  Gastrointestinal: Soft, non distended, mild tenderness at the incision site; incision closed with stitches, c/d/i  Genitourinary: Urinary catheter in place  Extremities: SCD's in place and working bilaterally  Vascular: Palpable dp pulses bilaterally  Neurological: A&Ox4  Skin: no rashes, ulcerations or lesions;  Musculoskeletal: Moving all extremities  Psychiatric: Responsive

## 2024-11-07 NOTE — PROGRESS NOTE ADULT - SUBJECTIVE AND OBJECTIVE BOX
[facility] DIVISION OF KIDNEY DISEASES AND HYPERTENSION -- FOLLOW UP NOTE  --------------------------------------------------------------------------------  Chief Complaint:    24 hour events/subjective:        PAST HISTORY  --------------------------------------------------------------------------------  No significant changes to PMH, PSH, FHx, SHx, unless otherwise noted    ALLERGIES & MEDICATIONS  --------------------------------------------------------------------------------  Allergies    amoxicillin (Angioedema; Urticaria)    Intolerances      Standing Inpatient Medications  atorvastatin 20 milliGRAM(s) Oral at bedtime  chlorhexidine 2% Cloths 1 Application(s) Topical daily  famotidine    Tablet 20 milliGRAM(s) Oral daily  heparin   Injectable 5000 Unit(s) SubCutaneous every 12 hours  mycophenolate mofetil 1 Gram(s) Oral every 12 hours  nystatin    Suspension 420860 Unit(s) Swish and Swallow four times a day  polyethylene glycol 3350 17 Gram(s) Oral daily  senna 2 Tablet(s) Oral at bedtime  trimethoprim   80 mG/sulfamethoxazole 400 mG 1 Tablet(s) Oral daily  valGANciclovir 900 milliGRAM(s) Oral <User Schedule>    PRN Inpatient Medications  ondansetron Injectable 4 milliGRAM(s) IV Push every 6 hours PRN  traMADol 25 milliGRAM(s) Oral every 8 hours PRN  traMADol 50 milliGRAM(s) Oral every 8 hours PRN      REVIEW OF SYSTEMS  --------------------------------------------------------------------------------  Gen: No fevers/chills  Respiratory: No dyspnea, cough,   CV: No chest pain, PND, orthopnea  GI: No abdominal pain, diarrhea, constipation, nausea, vomiting  Transplant: No pain  : No increased frequency, dysuria, hematuria   MSK: No edema  Neuro: No dizziness/lightheadedness    All other systems were reviewed and are negative, except as noted.    VITALS/PHYSICAL EXAM  --------------------------------------------------------------------------------  T(C): 36.5 (11-07-24 @ 13:00), Max: 36.9 (11-06-24 @ 17:00)  HR: 92 (11-07-24 @ 13:00) (76 - 92)  BP: 142/94 (11-07-24 @ 13:00) (131/84 - 147/91)  RR: 18 (11-07-24 @ 13:00) (16 - 18)  SpO2: 97% (11-07-24 @ 13:00) (95% - 99%)  Wt(kg): --        11-06-24 @ 07:01  -  11-07-24 @ 07:00  --------------------------------------------------------  IN: 1600.2 mL / OUT: 3380 mL / NET: -1779.8 mL    11-07-24 @ 07:01  -  11-07-24 @ 14:45  --------------------------------------------------------  IN: 780 mL / OUT: 625 mL / NET: 155 mL      Physical Exam:  Gen: NAD, able to speak in full sentences   HEENT: PERRL, MMM   Pulm: CTA B/L, no crackles   CV: RRR, S1S2+  Abd: +BS, soft  Transplant: No tenderness, swelling  : No suprapubic tenderness  MSK: no edema   Psych: Normal affect and mood  Skin: Warm    LABS/STUDIES  --------------------------------------------------------------------------------              8.6    2.88  >-----------<  185      [11-07-24 @ 07:18]              27.9     136  |  106  |  30  ----------------------------<  115      [11-07-24 @ 07:11]  5.1   |  20  |  1.56        Ca     8.4     [11-07-24 @ 07:11]      Mg     2.5     [11-07-24 @ 07:11]      Phos  2.8     [11-07-24 @ 07:11]    TPro  6.4  /  Alb  3.4  /  TBili  0.2  /  DBili  x   /  AST  12  /  ALT  6   /  AlkPhos  43  [11-07-24 @ 07:11]        Uric acid 3.6      [11-07-24 @ 07:11]    Creatinine Trend:  SCr 1.56 [11-07 @ 07:11]  SCr 2.05 [11-06 @ 06:37]  SCr 3.04 [11-05 @ 06:47]  SCr 4.07 [11-04 @ 21:35]  SCr 5.54 [11-04 @ 16:14]    Tacrolimus (), Serum: 4.1 ng/mL (11-07 @ 07:11)  Tacrolimus (), Serum: 4.5 ng/mL (11-06 @ 06:36)            Urine Creatinine 62      [11-07-24 @ 07:13]  Urine Protein 121      [11-07-24 @ 07:13]      HBsAb 15.7      [11-04-24 @ 07:06]  HBsAg Nonreact      [11-04-24 @ 07:05]  HBcAb Nonreact      [11-04-24 @ 07:06]  HCV 0.05, Nonreact      [11-04-24 @ 07:05]  HIV Nonreact      [11-04-24 @ 07:06]       [facility] DIVISION OF KIDNEY DISEASES AND HYPERTENSION -- FOLLOW UP NOTE  --------------------------------------------------------------------------------  Chief Complaint:  kidney transplant    24 hour events/subjective:  Pt feels well.       PAST HISTORY  --------------------------------------------------------------------------------  No significant changes to PMH, PSH, FHx, SHx, unless otherwise noted    ALLERGIES & MEDICATIONS  --------------------------------------------------------------------------------  Allergies    amoxicillin (Angioedema; Urticaria)    Intolerances      Standing Inpatient Medications  atorvastatin 20 milliGRAM(s) Oral at bedtime  chlorhexidine 2% Cloths 1 Application(s) Topical daily  famotidine    Tablet 20 milliGRAM(s) Oral daily  heparin   Injectable 5000 Unit(s) SubCutaneous every 12 hours  mycophenolate mofetil 1 Gram(s) Oral every 12 hours  nystatin    Suspension 173780 Unit(s) Swish and Swallow four times a day  polyethylene glycol 3350 17 Gram(s) Oral daily  senna 2 Tablet(s) Oral at bedtime  trimethoprim   80 mG/sulfamethoxazole 400 mG 1 Tablet(s) Oral daily  valGANciclovir 900 milliGRAM(s) Oral <User Schedule>    PRN Inpatient Medications  ondansetron Injectable 4 milliGRAM(s) IV Push every 6 hours PRN  traMADol 25 milliGRAM(s) Oral every 8 hours PRN  traMADol 50 milliGRAM(s) Oral every 8 hours PRN      REVIEW OF SYSTEMS  --------------------------------------------------------------------------------  Gen: No fevers/chills  Respiratory: No dyspnea, cough,   CV: No chest pain, PND, orthopnea  GI: No abdominal pain, diarrhea, constipation, nausea, vomiting  Transplant: No pain  : No increased frequency, dysuria, hematuria   MSK: No edema  Neuro: No dizziness/lightheadedness    All other systems were reviewed and are negative, except as noted.    VITALS/PHYSICAL EXAM  --------------------------------------------------------------------------------  T(C): 36.5 (11-07-24 @ 13:00), Max: 36.9 (11-06-24 @ 17:00)  HR: 92 (11-07-24 @ 13:00) (76 - 92)  BP: 142/94 (11-07-24 @ 13:00) (131/84 - 147/91)  RR: 18 (11-07-24 @ 13:00) (16 - 18)  SpO2: 97% (11-07-24 @ 13:00) (95% - 99%)  Wt(kg): --        11-06-24 @ 07:01  -  11-07-24 @ 07:00  --------------------------------------------------------  IN: 1600.2 mL / OUT: 3380 mL / NET: -1779.8 mL    11-07-24 @ 07:01  -  11-07-24 @ 14:45  --------------------------------------------------------  IN: 780 mL / OUT: 625 mL / NET: 155 mL      Physical Exam:  Gen: NAD, able to speak in full sentences   HEENT: PERRL, MMM   Pulm: CTA B/L, no crackles   CV: RRR, S1S2+  Abd: +BS, soft  Transplant: No tenderness, swelling  : No suprapubic tenderness  MSK: no edema   Psych: Normal affect and mood  Skin: Warm    LABS/STUDIES  --------------------------------------------------------------------------------              8.6    2.88  >-----------<  185      [11-07-24 @ 07:18]              27.9     136  |  106  |  30  ----------------------------<  115      [11-07-24 @ 07:11]  5.1   |  20  |  1.56        Ca     8.4     [11-07-24 @ 07:11]      Mg     2.5     [11-07-24 @ 07:11]      Phos  2.8     [11-07-24 @ 07:11]    TPro  6.4  /  Alb  3.4  /  TBili  0.2  /  DBili  x   /  AST  12  /  ALT  6   /  AlkPhos  43  [11-07-24 @ 07:11]        Uric acid 3.6      [11-07-24 @ 07:11]    Creatinine Trend:  SCr 1.56 [11-07 @ 07:11]  SCr 2.05 [11-06 @ 06:37]  SCr 3.04 [11-05 @ 06:47]  SCr 4.07 [11-04 @ 21:35]  SCr 5.54 [11-04 @ 16:14]    Tacrolimus (), Serum: 4.1 ng/mL (11-07 @ 07:11)  Tacrolimus (), Serum: 4.5 ng/mL (11-06 @ 06:36)            Urine Creatinine 62      [11-07-24 @ 07:13]  Urine Protein 121      [11-07-24 @ 07:13]      HBsAb 15.7      [11-04-24 @ 07:06]  HBsAg Nonreact      [11-04-24 @ 07:05]  HBcAb Nonreact      [11-04-24 @ 07:06]  HCV 0.05, Nonreact      [11-04-24 @ 07:05]  HIV Nonreact      [11-04-24 @ 07:06]

## 2024-11-07 NOTE — PROGRESS NOTE ADULT - SUBJECTIVE AND OBJECTIVE BOX
NEPHROLOGY-[facility] [phone]        Patient seen and examined in bed.  She was feeling great         MEDICATIONS  (STANDING):  atorvastatin 20 milliGRAM(s) Oral at bedtime  chlorhexidine 2% Cloths 1 Application(s) Topical daily  famotidine    Tablet 20 milliGRAM(s) Oral daily  heparin   Injectable 5000 Unit(s) SubCutaneous every 12 hours  mycophenolate mofetil 1 Gram(s) Oral every 12 hours  nystatin    Suspension 668588 Unit(s) Swish and Swallow four times a day  polyethylene glycol 3350 17 Gram(s) Oral daily  senna 2 Tablet(s) Oral at bedtime  tacrolimus ER Tablet (ENVARSUS XR) 6 milliGRAM(s) Oral <User Schedule>  trimethoprim   80 mG/sulfamethoxazole 400 mG 1 Tablet(s) Oral daily  valGANciclovir 900 milliGRAM(s) Oral <User Schedule>      VITAL:  T(C): , Max: 36.9 (11-06-24 @ 17:00)  T(F): , Max: 98.4 (11-06-24 @ 17:00)  HR: 83 (11-07-24 @ 05:55)  BP: 145/93 (11-07-24 @ 05:55)  BP(mean): 114 (11-07-24 @ 05:55)  RR: 16 (11-07-24 @ 05:55)  SpO2: 99% (11-07-24 @ 05:55)  Wt(kg): --    I and O's:    11-06 @ 07:01  -  11-07 @ 07:00  --------------------------------------------------------  IN: 1600.2 mL / OUT: 3380 mL / NET: -1779.8 mL          PHYSICAL EXAM:    Constitutional: NAD  Neck:  No JVD  Respiratory: CTAB/L  Cardiovascular: S1 and S2  Gastrointestinal: BS+, soft, NT/ND  Extremities: No peripheral edema  Neurological: A/O x 3, no focal deficits  Psychiatric: Normal mood, normal affect  : No Thomas  Skin: No rashes  Access: Not applicable    LABS:                        8.5    6.85  )-----------( 213      ( 06 Nov 2024 06:36 )             27.9     11-06    136  |  105  |  33[H]  ----------------------------<  116[H]  4.7   |  20[L]  |  2.05[H]    Ca    8.1[L]      06 Nov 2024 06:37  Phos  3.7     11-06  Mg     2.7     11-06    TPro  6.4  /  Alb  3.2[L]  /  TBili  0.2  /  DBili  x   /  AST  17  /  ALT  8[L]  /  AlkPhos  47  11-06          Urine Studies:  Urinalysis Basic - ( 06 Nov 2024 06:37 )    Color: x / Appearance: x / SG: x / pH: x  Gluc: 116 mg/dL / Ketone: x  / Bili: x / Urobili: x   Blood: x / Protein: x / Nitrite: x   Leuk Esterase: x / RBC: x / WBC x   Sq Epi: x / Non Sq Epi: x / Bacteria: x      Creatinine, Random Urine: 62 mg/dL (11-07 @ 07:13)  Protein/Creatinine Ratio Calculation: 2.0 Ratio (11-07 @ 07:13)  Creatinine, Random Urine: 72 mg/dL (11-06 @ 06:45)  Protein/Creatinine Ratio Calculation: 1.4 Ratio (11-06 @ 06:45)        RADIOLOGY & ADDITIONAL STUDIES:          < from: Xray Chest 1 View- PORTABLE-Urgent (11.04.24 @ 17:27) >    ACC: [id number] EXAM:  XR CHEST PORTABLE URGENT 1V   ORDERED BY:    DEYA GARLAND     PROCEDURE DATE:  11/04/2024          INTERPRETATION:  Chest one view    HISTORY: Postop    COMPARISON STUDY: 1/3/2024    Frontal expiratory view of the chest shows the heart to be similar in   size. The lungs show mild pulmonary congestion and there is no evidence   of pneumothorax nor pleural effusion.    IMPRESSION:  Mild congestive changes.        Thank you for the courtesy of this referral.    --- End ofReport ---            DAVID BURNHAM MD; Attending Interventional Radiologist  This document has been electronically signed. Nov 5 2024  4:14PM    < end of copied text >

## 2024-11-07 NOTE — PROGRESS NOTE ADULT - ASSESSMENT
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 Patient with kidney biopsy done in 2015 ( at [facility]) with FSGS, was on steroids for 2-3 years, Patient underwent LDRT with renal transplant on 11/4/24, on the RLQ under steroid and Thymo induction.     1. s/p LDRT on 11/4/24 in the RLQ  Anatomy: 1V / 1A/ 1 U (stented)  Induction: steroid and Thymo induction.  Patient Scr ranged from 4.9 to 6.7 since Feb 2024. On admission, Scr elevated at 6.09, today Scr is at 1.5  24H UOP: non oliguric  Urine protein noted to be elevated at >1000 mg/dl and UPCR elevated at 5.5 in 11/2023. Most recent Urine Protein was 300mg/dl and UPCR 3.0 from 9/2024. UPCR 1.4 on 11/5. UPCR 2.0 on 11/7  Post txp Renal US showed well perfused renal transplant and no peritransplant fluid collection. No tardus parvus waveforms.   Stop IV Fluids and continue PO intake     2. IS   Induction: steroid and Thymo induction.  Envarsus by lvl, MMF 1/1, steroid taper - tacro level today is 4.1. Will continue to monitor tacro levels daily - 30 mins before the morning dose.   PPX: nystatin, Bactrim, Valcyte    3. HTN  patient was on olmesartan and amlodipine at home  Can hold BP meds as patient is Post op    4. HLD on statin    If you have any questions, please feel free to contact me  Maxwell Kaiser MD  Nephrology Fellow  Page [phone] / Microsoft Teams (Preferred); Please PAGE for urgent consults only.  (After 5pm or on weekends please page the on-call fellow)

## 2024-11-07 NOTE — PROGRESS NOTE ADULT - ASSESSMENT
34 year old  RHD female  with advanced CKD since 10/ 2015 , FSGS diagnosed in 2016 currently on Farxiga and Ozempic  Thymo induction   Metabolic acidosis(presumed)  Anemia   Subnephrotic range proteinuria       1 Renal- Serum creatinine coming down nicely at present;    Trend serum creatinine and labs this am pending   Can the gasca be removed now   2 Transplant- Thymo induction and on Envarus and Cellcept   SP IV Solumederol   Bactrim for 9 months and Valcyte for 6 months   3 Anemia- Retacrit for anemia    Sayed [facility]   [phone]

## 2024-11-07 NOTE — PROGRESS NOTE ADULT - ASSESSMENT
33yo RHD female with advanced CKD since 10/ 2015, FSIG diagnosed in 2016 currently on Farxiga now s/p LDRT (1a/1v/1u+stent) on 11/04/2024 under Thymo induction     OR: EBL 50    [] LDRT POD #3   - Renal US: patent   - Strict I/O's, DC gasca    - Regular Diet    - SQH  - c/w lipitor 20mg  - Pain management  - Bowel regimen  - SCD/PT/IS  - check daily urine cr/protein     [] Immunosuppression  - thymo induction completed   - Envarsus per level , MMF 1/1, steroid taper  - ppx: nystatin, Bactrim, Valcyte

## 2024-11-08 ENCOUNTER — TRANSCRIPTION ENCOUNTER (OUTPATIENT)
Age: 34
End: 2024-11-08

## 2024-11-08 VITALS
OXYGEN SATURATION: 99 % | TEMPERATURE: 98 F | HEART RATE: 94 BPM | SYSTOLIC BLOOD PRESSURE: 141 MMHG | RESPIRATION RATE: 18 BRPM | DIASTOLIC BLOOD PRESSURE: 85 MMHG

## 2024-11-08 LAB
ALBUMIN SERPL ELPH-MCNC: 3.3 G/DL — SIGNIFICANT CHANGE UP (ref 3.3–5)
ALBUMIN SERPL ELPH-MCNC: 3.6 G/DL — SIGNIFICANT CHANGE UP (ref 3.3–5)
ALP SERPL-CCNC: 43 U/L — SIGNIFICANT CHANGE UP (ref 40–120)
ALP SERPL-CCNC: 47 U/L — SIGNIFICANT CHANGE UP (ref 40–120)
ALT FLD-CCNC: 6 U/L — LOW (ref 10–45)
ALT FLD-CCNC: <5 U/L — LOW (ref 10–45)
ANION GAP SERPL CALC-SCNC: 10 MMOL/L — SIGNIFICANT CHANGE UP (ref 5–17)
ANION GAP SERPL CALC-SCNC: 12 MMOL/L — SIGNIFICANT CHANGE UP (ref 5–17)
AST SERPL-CCNC: 10 U/L — SIGNIFICANT CHANGE UP (ref 10–40)
AST SERPL-CCNC: 10 U/L — SIGNIFICANT CHANGE UP (ref 10–40)
BASOPHILS # BLD AUTO: 0.01 K/UL — SIGNIFICANT CHANGE UP (ref 0–0.2)
BASOPHILS NFR BLD AUTO: 0.2 % — SIGNIFICANT CHANGE UP (ref 0–2)
BILIRUB SERPL-MCNC: 0.1 MG/DL — LOW (ref 0.2–1.2)
BILIRUB SERPL-MCNC: 0.2 MG/DL — SIGNIFICANT CHANGE UP (ref 0.2–1.2)
BUN SERPL-MCNC: 26 MG/DL — HIGH (ref 7–23)
BUN SERPL-MCNC: 30 MG/DL — HIGH (ref 7–23)
CALCIUM SERPL-MCNC: 8.8 MG/DL — SIGNIFICANT CHANGE UP (ref 8.4–10.5)
CALCIUM SERPL-MCNC: 8.9 MG/DL — SIGNIFICANT CHANGE UP (ref 8.4–10.5)
CHLORIDE SERPL-SCNC: 107 MMOL/L — SIGNIFICANT CHANGE UP (ref 96–108)
CHLORIDE SERPL-SCNC: 108 MMOL/L — SIGNIFICANT CHANGE UP (ref 96–108)
CO2 SERPL-SCNC: 19 MMOL/L — LOW (ref 22–31)
CO2 SERPL-SCNC: 19 MMOL/L — LOW (ref 22–31)
CREAT ?TM UR-MCNC: 69 MG/DL — SIGNIFICANT CHANGE UP
CREAT SERPL-MCNC: 1.59 MG/DL — HIGH (ref 0.5–1.3)
CREAT SERPL-MCNC: 1.61 MG/DL — HIGH (ref 0.5–1.3)
EGFR: 43 ML/MIN/1.73M2 — LOW
EGFR: 44 ML/MIN/1.73M2 — LOW
EOSINOPHIL # BLD AUTO: 0.01 K/UL — SIGNIFICANT CHANGE UP (ref 0–0.5)
EOSINOPHIL NFR BLD AUTO: 0.2 % — SIGNIFICANT CHANGE UP (ref 0–6)
GLUCOSE BLDC GLUCOMTR-MCNC: 148 MG/DL — HIGH (ref 70–99)
GLUCOSE BLDC GLUCOMTR-MCNC: 85 MG/DL — SIGNIFICANT CHANGE UP (ref 70–99)
GLUCOSE SERPL-MCNC: 148 MG/DL — HIGH (ref 70–99)
GLUCOSE SERPL-MCNC: 94 MG/DL — SIGNIFICANT CHANGE UP (ref 70–99)
HAPTOGLOB SERPL-MCNC: 245 MG/DL — HIGH (ref 34–200)
HCT VFR BLD CALC: 27.1 % — LOW (ref 34.5–45)
HGB BLD-MCNC: 8.2 G/DL — LOW (ref 11.5–15.5)
IMM GRANULOCYTES NFR BLD AUTO: 1.2 % — HIGH (ref 0–0.9)
LYMPHOCYTES # BLD AUTO: 0.16 K/UL — LOW (ref 1–3.3)
LYMPHOCYTES # BLD AUTO: 3.1 % — LOW (ref 13–44)
MAGNESIUM SERPL-MCNC: 2.2 MG/DL — SIGNIFICANT CHANGE UP (ref 1.6–2.6)
MCHC RBC-ENTMCNC: 24.4 PG — LOW (ref 27–34)
MCHC RBC-ENTMCNC: 30.3 G/DL — LOW (ref 32–36)
MCV RBC AUTO: 80.7 FL — SIGNIFICANT CHANGE UP (ref 80–100)
MONOCYTES # BLD AUTO: 0.28 K/UL — SIGNIFICANT CHANGE UP (ref 0–0.9)
MONOCYTES NFR BLD AUTO: 5.5 % — SIGNIFICANT CHANGE UP (ref 2–14)
NEUTROPHILS # BLD AUTO: 4.57 K/UL — SIGNIFICANT CHANGE UP (ref 1.8–7.4)
NEUTROPHILS NFR BLD AUTO: 89.8 % — HIGH (ref 43–77)
NRBC # BLD: 0 /100 WBCS — SIGNIFICANT CHANGE UP (ref 0–0)
PHOSPHATE SERPL-MCNC: 2 MG/DL — LOW (ref 2.5–4.5)
PLATELET # BLD AUTO: 201 K/UL — SIGNIFICANT CHANGE UP (ref 150–400)
POTASSIUM SERPL-MCNC: 4.3 MMOL/L — SIGNIFICANT CHANGE UP (ref 3.5–5.3)
POTASSIUM SERPL-MCNC: 4.7 MMOL/L — SIGNIFICANT CHANGE UP (ref 3.5–5.3)
POTASSIUM SERPL-SCNC: 4.3 MMOL/L — SIGNIFICANT CHANGE UP (ref 3.5–5.3)
POTASSIUM SERPL-SCNC: 4.7 MMOL/L — SIGNIFICANT CHANGE UP (ref 3.5–5.3)
PROT ?TM UR-MCNC: 107 MG/DL — HIGH (ref 0–12)
PROT SERPL-MCNC: 6.3 G/DL — SIGNIFICANT CHANGE UP (ref 6–8.3)
PROT SERPL-MCNC: 6.6 G/DL — SIGNIFICANT CHANGE UP (ref 6–8.3)
PROT/CREAT UR-RTO: 1.6 RATIO — HIGH (ref 0–0.2)
RBC # BLD: 3.36 M/UL — LOW (ref 3.8–5.2)
RBC # FLD: 15.4 % — HIGH (ref 10.3–14.5)
SODIUM SERPL-SCNC: 137 MMOL/L — SIGNIFICANT CHANGE UP (ref 135–145)
SODIUM SERPL-SCNC: 138 MMOL/L — SIGNIFICANT CHANGE UP (ref 135–145)
TACROLIMUS SERPL-MCNC: 3.6 NG/ML — SIGNIFICANT CHANGE UP
URATE SERPL-MCNC: 3.6 MG/DL — SIGNIFICANT CHANGE UP (ref 2.5–7)
WBC # BLD: 5.09 K/UL — SIGNIFICANT CHANGE UP (ref 3.8–10.5)
WBC # FLD AUTO: 5.09 K/UL — SIGNIFICANT CHANGE UP (ref 3.8–10.5)

## 2024-11-08 PROCEDURE — 84550 ASSAY OF BLOOD/URIC ACID: CPT

## 2024-11-08 PROCEDURE — 87522 HEPATITIS C REVRS TRNSCRPJ: CPT

## 2024-11-08 PROCEDURE — 82962 GLUCOSE BLOOD TEST: CPT

## 2024-11-08 PROCEDURE — 87389 HIV-1 AG W/HIV-1&-2 AB AG IA: CPT

## 2024-11-08 PROCEDURE — 97161 PT EVAL LOW COMPLEX 20 MIN: CPT

## 2024-11-08 PROCEDURE — 99232 SBSQ HOSP IP/OBS MODERATE 35: CPT | Mod: GC

## 2024-11-08 PROCEDURE — C1769: CPT

## 2024-11-08 PROCEDURE — 85014 HEMATOCRIT: CPT

## 2024-11-08 PROCEDURE — 83735 ASSAY OF MAGNESIUM: CPT

## 2024-11-08 PROCEDURE — C9399: CPT

## 2024-11-08 PROCEDURE — 97116 GAIT TRAINING THERAPY: CPT

## 2024-11-08 PROCEDURE — 83010 ASSAY OF HAPTOGLOBIN QUANT: CPT

## 2024-11-08 PROCEDURE — 85610 PROTHROMBIN TIME: CPT

## 2024-11-08 PROCEDURE — 86850 RBC ANTIBODY SCREEN: CPT

## 2024-11-08 PROCEDURE — 71045 X-RAY EXAM CHEST 1 VIEW: CPT

## 2024-11-08 PROCEDURE — 84702 CHORIONIC GONADOTROPIN TEST: CPT

## 2024-11-08 PROCEDURE — 84156 ASSAY OF PROTEIN URINE: CPT

## 2024-11-08 PROCEDURE — 83605 ASSAY OF LACTIC ACID: CPT

## 2024-11-08 PROCEDURE — 85025 COMPLETE CBC W/AUTO DIFF WBC: CPT

## 2024-11-08 PROCEDURE — 82947 ASSAY GLUCOSE BLOOD QUANT: CPT

## 2024-11-08 PROCEDURE — 81025 URINE PREGNANCY TEST: CPT

## 2024-11-08 PROCEDURE — 84132 ASSAY OF SERUM POTASSIUM: CPT

## 2024-11-08 PROCEDURE — 86704 HEP B CORE ANTIBODY TOTAL: CPT

## 2024-11-08 PROCEDURE — 85018 HEMOGLOBIN: CPT

## 2024-11-08 PROCEDURE — 82803 BLOOD GASES ANY COMBINATION: CPT

## 2024-11-08 PROCEDURE — 86706 HEP B SURFACE ANTIBODY: CPT

## 2024-11-08 PROCEDURE — 80197 ASSAY OF TACROLIMUS: CPT

## 2024-11-08 PROCEDURE — 82330 ASSAY OF CALCIUM: CPT

## 2024-11-08 PROCEDURE — 84100 ASSAY OF PHOSPHORUS: CPT

## 2024-11-08 PROCEDURE — C1889: CPT

## 2024-11-08 PROCEDURE — 82435 ASSAY OF BLOOD CHLORIDE: CPT

## 2024-11-08 PROCEDURE — 86900 BLOOD TYPING SEROLOGIC ABO: CPT

## 2024-11-08 PROCEDURE — 93005 ELECTROCARDIOGRAM TRACING: CPT

## 2024-11-08 PROCEDURE — 84295 ASSAY OF SERUM SODIUM: CPT

## 2024-11-08 PROCEDURE — 76776 US EXAM K TRANSPL W/DOPPLER: CPT

## 2024-11-08 PROCEDURE — 86803 HEPATITIS C AB TEST: CPT

## 2024-11-08 PROCEDURE — 36415 COLL VENOUS BLD VENIPUNCTURE: CPT

## 2024-11-08 PROCEDURE — 87340 HEPATITIS B SURFACE AG IA: CPT

## 2024-11-08 PROCEDURE — 80053 COMPREHEN METABOLIC PANEL: CPT

## 2024-11-08 PROCEDURE — 86901 BLOOD TYPING SEROLOGIC RH(D): CPT

## 2024-11-08 PROCEDURE — 85730 THROMBOPLASTIN TIME PARTIAL: CPT

## 2024-11-08 PROCEDURE — C2617: CPT

## 2024-11-08 PROCEDURE — 82570 ASSAY OF URINE CREATININE: CPT

## 2024-11-08 RX ORDER — TACROLIMUS 0.5 MG/1
8 CAPSULE, GELATIN COATED ORAL
Refills: 0 | Status: DISCONTINUED | OUTPATIENT
Start: 2024-11-09 | End: 2024-11-08

## 2024-11-08 RX ORDER — SULFAMETHOXAZOLE/TRIMETHOPRIM 800-160 MG
1 TABLET ORAL
Qty: 0 | Refills: 0 | DISCHARGE
Start: 2024-11-08

## 2024-11-08 RX ORDER — MYCOPHENOLATE MOFETIL 500 MG/1
1000 TABLET, FILM COATED ORAL
Qty: 0 | Refills: 0 | DISCHARGE
Start: 2024-11-08

## 2024-11-08 RX ORDER — CALCITRIOL 0.25 UG/1
1 CAPSULE, LIQUID FILLED ORAL
Refills: 0 | DISCHARGE

## 2024-11-08 RX ORDER — FAMOTIDINE 10 MG/ML
1 INJECTION INTRAVENOUS
Qty: 0 | Refills: 0 | DISCHARGE
Start: 2024-11-08

## 2024-11-08 RX ORDER — DAPAGLIFLOZIN 10 MG/1
1 TABLET, FILM COATED ORAL
Refills: 0 | DISCHARGE

## 2024-11-08 RX ORDER — SENNA 187 MG
2 TABLET ORAL
Qty: 0 | Refills: 0 | DISCHARGE
Start: 2024-11-08

## 2024-11-08 RX ORDER — ERYTHROPOIETIN 10000 [IU]/ML
10000 INJECTION, SOLUTION INTRAVENOUS; SUBCUTANEOUS ONCE
Refills: 0 | Status: COMPLETED | OUTPATIENT
Start: 2024-11-08 | End: 2024-11-08

## 2024-11-08 RX ORDER — SEMAGLUTIDE 1.34 MG/ML
0.5 INJECTION, SOLUTION SUBCUTANEOUS
Refills: 0 | DISCHARGE

## 2024-11-08 RX ORDER — VALGANCICLOVIR 450 MG/1
2 TABLET, FILM COATED ORAL
Qty: 0 | Refills: 0 | DISCHARGE
Start: 2024-11-08

## 2024-11-08 RX ORDER — TACROLIMUS 0.5 MG/1
1 CAPSULE, GELATIN COATED ORAL
Qty: 30 | Refills: 0
Start: 2024-11-08 | End: 2024-12-07

## 2024-11-08 RX ORDER — TACROLIMUS 0.5 MG/1
2 CAPSULE, GELATIN COATED ORAL
Qty: 0 | Refills: 0 | DISCHARGE
Start: 2024-11-08

## 2024-11-08 RX ORDER — NYSTATIN 10B UNIT
5 POWDER (EA) MISCELLANEOUS
Qty: 0 | Refills: 0 | DISCHARGE
Start: 2024-11-08

## 2024-11-08 RX ORDER — TACROLIMUS 0.5 MG/1
1 CAPSULE, GELATIN COATED ORAL ONCE
Refills: 0 | Status: COMPLETED | OUTPATIENT
Start: 2024-11-08 | End: 2024-11-08

## 2024-11-08 RX ORDER — AMLODIPINE BESYLATE 10 MG
1 TABLET ORAL
Refills: 0 | DISCHARGE

## 2024-11-08 RX ADMIN — TACROLIMUS 7 MILLIGRAM(S): 0.5 CAPSULE, GELATIN COATED ORAL at 09:00

## 2024-11-08 RX ADMIN — Medication 500000 UNIT(S): at 12:29

## 2024-11-08 RX ADMIN — VALGANCICLOVIR 900 MILLIGRAM(S): 450 TABLET, FILM COATED ORAL at 12:29

## 2024-11-08 RX ADMIN — FAMOTIDINE 20 MILLIGRAM(S): 10 INJECTION INTRAVENOUS at 12:28

## 2024-11-08 RX ADMIN — Medication 500000 UNIT(S): at 05:33

## 2024-11-08 RX ADMIN — ERYTHROPOIETIN 10000 UNIT(S): 10000 INJECTION, SOLUTION INTRAVENOUS; SUBCUTANEOUS at 12:29

## 2024-11-08 RX ADMIN — POLYETHYLENE GLYCOL 3350 17 GRAM(S): 17 POWDER, FOR SOLUTION ORAL at 12:28

## 2024-11-08 RX ADMIN — TACROLIMUS 1 MILLIGRAM(S): 0.5 CAPSULE, GELATIN COATED ORAL at 12:31

## 2024-11-08 RX ADMIN — Medication 1 TABLET(S): at 12:29

## 2024-11-08 RX ADMIN — CHLORHEXIDINE GLUCONATE 1 APPLICATION(S): 40 SOLUTION TOPICAL at 12:31

## 2024-11-08 RX ADMIN — MYCOPHENOLATE MOFETIL 1 GRAM(S): 500 TABLET, FILM COATED ORAL at 09:00

## 2024-11-08 RX ADMIN — Medication 40 MILLIGRAM(S): at 09:01

## 2024-11-08 NOTE — DISCHARGE NOTE NURSING/CASE MANAGEMENT/SOCIAL WORK - PATIENT PORTAL LINK FT
You can access the FollowMyHealth Patient Portal offered by [facility] by registering at the following website: [email or web address]. By joining [employer]’s FollowMyHealth portal, you will also be able to view your health information using other applications (apps) compatible with our system.

## 2024-11-08 NOTE — DISCHARGE NOTE PROVIDER - CARE PROVIDERS DIRECT ADDRESSES
[email or web address]

## 2024-11-08 NOTE — PROGRESS NOTE ADULT - ATTENDING COMMENTS
Creatinine stable and no increase in proteinuria.  d/c home today
Pt feels well  creatinine improving  p/c ratio 2 - does not support recurrence of FSGS, will cont to monitor daily.  May have increased since ARB stopped.   Cont current immunosuppression.  Possible d/c tomorrow.
Pt feels well  creatinine improving  p/c ratio 1.4 - does not support recurrence of FSGS, will cont to monitor daily.   Cont current immunosuppression.

## 2024-11-08 NOTE — DISCHARGE NOTE PROVIDER - NSDCFUSCHEDAPPT_GEN_ALL_CORE_FT
Asif Garcia  [facility] Physician [facility]  CARDIOLOGY [address] [facility]   Scheduled Appointment: 12/03/2024

## 2024-11-08 NOTE — CHART NOTE - NSCHARTNOTEFT_GEN_A_CORE
NUTRITION FOLLOW UP NOTE    PATIENT SEEN FOR:  Pt seen for post kidney transplant recipient nutrition follow up    SOURCE: [X] Patient  [x] Current Medical Record  [] RN  [X] Family/support person at bedside  [] Patient unavailable/inappropriate  [] Other:    CHART REVIEWED/EVENTS NOTED.  [X] No changes to nutrition care plan to note  [X] Nutrition Status:  -  LDRT 2024 POD#4  - good graft function  - planned for discharge home today     DIET ORDER:   Diet, Regular (24)    CURRENT DIET ORDER IS:  [X] Appropriate:  [] Inadequate:  [] Other:    NUTRITION INTAKE/PROVISION:  [X] PO: Pt reports good appetite and PO intakes   [] Enteral Nutrition:  [] Parenteral Nutrition:    ANTHROPOMETRICS:  Drug Dosing Weight  Height (cm): 172.7 (2024 07:46)  Weight (kg): 106.1 (2024 07:46)  BMI (kg/m2): 35.6 (2024 07:46)  Weights:   Daily Weight in k.6 (), Weight in k.2 (), Weight in k.5 (), Weight in k.5 ()   -- Weight fluctuations in setting of fluid shifts (IVF, intraoperative fluids). Will continue to monitor weight trends as available/able.     NUTRITIONALLY PERTINENT MEDICATIONS  (STANDING):  atorvastatin 20 milliGRAM(s) Oral at bedtime  famotidine    Tablet 20 milliGRAM(s) Oral daily  mycophenolate mofetil 1 Gram(s) Oral every 12 hours  nystatin    Suspension 795677 Unit(s) Swish and Swallow four times a day  polyethylene glycol 3350 17 Gram(s) Oral daily  predniSONE   Tablet   Oral   senna 2 Tablet(s) Oral at bedtime  trimethoprim   80 mG/sulfamethoxazole 400 mG 1 Tablet(s) Oral daily  valGANciclovir 900 milliGRAM(s) Oral <User Schedule>    MEDICATIONS  (PRN):  ondansetron Injectable 4 milliGRAM(s) IV Push every 6 hours PRN Nausea and/or Vomiting  traMADol 25 milliGRAM(s) Oral every 8 hours PRN Moderate Pain (4 - 6)  traMADol 50 milliGRAM(s) Oral every 8 hours PRN Severe Pain (7 - 10)      NUTRITIONALLY PERTINENT LABS:   Na138 mmol/L Glu 148 mg/dL[H] K+ 4.3 mmol/L Cr  1.59 mg/dL[H] BUN 26 mg/dL[H]  Phos 2.0 mg/dL[L]  Alb 3.6 g/dL ALT <5 U/L[L] AST 10 U/L Alkaline Phosphatase 47 U/L      Finger Sticks:  POCT Blood Glucose.: 148 mg/dL ( @ 12:30)  POCT Blood Glucose.: 85 mg/dL ( @ 08:56)  POCT Blood Glucose.: 226 mg/dL ( @ 21:14)  POCT Blood Glucose.: 177 mg/dL ( @ 16:33)      NUTRITIONALLY PERTINENT MEDICATIONS/LABS:  [x] Reviewed  [X] Relevant notes on medications/labs:  -Hypophosphatemic  -Tacrolimus and Cellcept for immunosuppression    - Prednisone for post-transplant steroid regimen     EDEMA:  [x] Reviewed  [] Relevant notes:    GI/ I&O:  [x] Reviewed  [X] Relevant notes: Last bowel movement on 24, ordered for senna and Miralax   [X] Other: Urine output per flow sheets 0 ml thus far (), 2495 ml (), 3380 ml ()     SKIN:   [X] No pressure injuries documented, per nursing flowsheet  [] Pressure injury previously noted  [] Change in pressure injury documentation:  [] Other:    ESTIMATED NEEDS:  [X] No change:  [] Updated:  Energy:   kcal/day (xx-xx kcal/kg)  Protein:   g/day (xx-xx g/kg)  Fluid:   ml/day or [] defer to team  Based on:    NUTRITION DIAGNOSIS:  [X] Prior Dx: Increased Nutrient Needs; Food and Nutrition Related Knowledge Deficit   [] New Dx:    EDUCATION:  [X] Yes: Reviewed post-transplant nutrition therapy and food safety guidelines for transplant recipients.  Reviewed recommendations to avoid grapefruit, pomegranate and star fruit while taking immunosuppressant medication.  Reviewed recommendations for moderate intake of sodium with transplant medications.  Reviewed Consistent Carbohydrate diet, including carbohydrate content of foods and portion sizes. Pt is advised that BG management may be more challenging after transplant.   [] Not appropriate/warranted    NUTRITION CARE PLAN:  1) Continue Regular diet. Encourage adequate PO intakes to promote surgical healing post-transplant.   2) Reinforce post-transplant nutrition therapy and food safety guidelines in-house and prior to discharge.   3) Discharge diet: Continue as above. Recommend follow up visit with Transplant MD and outpatient RD for dietary modifications as warranted.    [X] Achieved - Continue current nutrition intervention(s)  [] Current medical condition precludes nutrition intervention at this time.    MONITORING AND EVALUATION:   RD remains available upon request and will follow up per protocol.    Kristine Kahn, MS,RDN,CDN,CNSC   Available on MS TEAMS

## 2024-11-08 NOTE — DISCHARGE NOTE PROVIDER - NSDCMRMEDTOKEN_GEN_ALL_CORE_FT
acetaminophen 325 mg oral tablet: 2 tab(s) orally every 6 hours as needed for  severe pain c/o recurrent back pain for a aweek  amLODIPine 10 mg oral tablet: 1 tab(s) orally once a day  atorvastatin 20 mg oral tablet: 1 tab(s) orally once a day  calcitriol 0.25 mcg oral capsule: 1 cap(s) orally once a day  Farxiga 10 mg oral tablet: 1 tab(s) orally once a day last dose 10/31 pre op  Ozempic 2 mg/1.5 mL (0.25 mg or 0.5 mg dose) subcutaneous solution: 0.5 milligram(s) subcutaneously once a week mondays last king 10/21  VITAMIN D3 2000 mcg daily:    atorvastatin 20 mg oral tablet: 1 tab(s) orally once a day (at bedtime)  Envarsus XR 1 mg oral tablet, extended release: 1 tab(s) orally DO NOT TAKE. Keep for dose titration.  famotidine 20 mg oral tablet: 1 tab(s) orally once a day  mycophenolate mofetil 250 mg oral capsule: 1,000 milligram(s) orally 2 times a day Please take at 8AM and 8PM  nystatin 100,000 units/mL oral suspension: 5 milliliter(s) orally 4 times a day  predniSONE: 5 milligram(s) orally once a day Please follow your prednisone taper. Please take 2 tablets (10 mg) 2 times per day on 11/9. Please take 1 tablet (5 mg) 2 times per day on 11/10. Please take 1 tablet (5mg) once a day on 11/11 and continue this dose daily until instructed by your Transplant Clinician  senna leaf extract oral tablet: 2 tab(s) orally once a day (at bedtime)  sulfamethoxazole-trimethoprim 400 mg-80 mg oral tablet: 1 tab(s) orally once a day  tacrolimus 4 mg oral tablet, extended release: 2 tab(s) orally once a day Please take at 8AM  valGANciclovir 450 mg oral tablet: 2 tab(s) orally once a day

## 2024-11-08 NOTE — DISCHARGE NOTE PROVIDER - CARE PROVIDER_API CALL
Lucius Delgado  Surgery  [address] [place] [address]  Phone: [phone]  Fax: [phone]  Follow Up Time:     Diogenes Chau  Nephrology  [address] [place] [address]  Phone: [phone]  Fax: [phone]  Follow Up Time:    Lucius Delgado  Surgery  [address] [place] [address]  Phone: [phone]  Fax: [phone]  Follow Up Time:     Diogenes Chau  Nephrology  [address] [place] [address]  Phone: [phone]  Fax: [phone]  Follow Up Time:     Kasia Chapman  Nephrology  [address] [place] [address]  Phone: [phone]  Fax: [phone]  Follow Up Time:     Tesfaye Morales  Surgery  [address] [place] [address]  Phone: [phone]  Fax: [phone]  Follow Up Time:

## 2024-11-08 NOTE — DISCHARGE NOTE PROVIDER - NSDCACTIVITY_GEN_ALL_CORE
No heavy lifting/straining/Activity as tolerated No heavy lifting/straining/Follow Instructions Provided by your Surgical Team/Activity as tolerated

## 2024-11-08 NOTE — PROGRESS NOTE ADULT - SUBJECTIVE AND OBJECTIVE BOX
NEPHROLOGY-[facility] [phone]        Patient seen and examined in bed. She was feeling great         MEDICATIONS  (STANDING):  atorvastatin 20 milliGRAM(s) Oral at bedtime  chlorhexidine 2% Cloths 1 Application(s) Topical daily  famotidine    Tablet 20 milliGRAM(s) Oral daily  heparin   Injectable 5000 Unit(s) SubCutaneous every 12 hours  mycophenolate mofetil 1 Gram(s) Oral every 12 hours  nystatin    Suspension 104610 Unit(s) Swish and Swallow four times a day  polyethylene glycol 3350 17 Gram(s) Oral daily  predniSONE   Tablet   Oral   senna 2 Tablet(s) Oral at bedtime  trimethoprim   80 mG/sulfamethoxazole 400 mG 1 Tablet(s) Oral daily  valGANciclovir 900 milliGRAM(s) Oral <User Schedule>      VITAL:  T(C): , Max: 36.9 (11-08-24 @ 09:00)  T(F): , Max: 98.5 (11-08-24 @ 09:00)  HR: 90 (11-08-24 @ 09:00)  BP: 146/95 (11-08-24 @ 09:00)  BP(mean): --  RR: 18 (11-08-24 @ 09:00)  SpO2: 96% (11-08-24 @ 09:00)  Wt(kg): --    I and O's:    11-07 @ 07:01  -  11-08 @ 07:00  --------------------------------------------------------  IN: 1040 mL / OUT: 2495 mL / NET: -1455 mL          PHYSICAL EXAM:    Constitutional: NAD  Neck:  No JVD  Respiratory: CTAB/L  Cardiovascular: S1 and S2  Gastrointestinal: BS+, soft, NT/ND  Extremities: No peripheral edema  Neurological: A/O x 3, no focal deficits  Psychiatric: Normal mood, normal affect  : No Thomas  Skin: No rashes  Access: Not applicable    LABS:                        8.2    5.09  )-----------( 201      ( 08 Nov 2024 06:33 )             27.1     11-08    137  |  108  |  30[H]  ----------------------------<  94  4.7   |  19[L]  |  1.61[H]    Ca    8.8      08 Nov 2024 06:33  Phos  2.0     11-08  Mg     2.2     11-08    TPro  6.3  /  Alb  3.3  /  TBili  0.1[L]  /  DBili  x   /  AST  10  /  ALT  6[L]  /  AlkPhos  43  11-08          Urine Studies:  Urinalysis Basic - ( 08 Nov 2024 06:33 )    Color: x / Appearance: x / SG: x / pH: x  Gluc: 94 mg/dL / Ketone: x  / Bili: x / Urobili: x   Blood: x / Protein: x / Nitrite: x   Leuk Esterase: x / RBC: x / WBC x   Sq Epi: x / Non Sq Epi: x / Bacteria: x      Creatinine, Random Urine: 69 mg/dL (11-08 @ 06:32)  Protein/Creatinine Ratio Calculation: 1.6 Ratio (11-08 @ 06:32)  Creatinine, Random Urine: 62 mg/dL (11-07 @ 07:13)  Protein/Creatinine Ratio Calculation: 2.0 Ratio (11-07 @ 07:13)        RADIOLOGY & ADDITIONAL STUDIES:

## 2024-11-08 NOTE — PROGRESS NOTE ADULT - SUBJECTIVE AND OBJECTIVE BOX
[facility] DIVISION OF KIDNEY DISEASES AND HYPERTENSION -- FOLLOW UP NOTE  --------------------------------------------------------------------------------  Chief Complaint:    24 hour events/subjective: Pt seen and evaluated bedside this morning. Reports feeling well, endorses no complaints. Denies any headaches, nausea, vomiting, fevers/chills, chest pain, palpitations, SOB, abdominal pain, and leg swelling.     PAST HISTORY  --------------------------------------------------------------------------------  No significant changes to PMH, PSH, FHx, SHx, unless otherwise noted    ALLERGIES & MEDICATIONS  --------------------------------------------------------------------------------  Allergies    amoxicillin (Angioedema; Urticaria)    Intolerances      Standing Inpatient Medications  atorvastatin 20 milliGRAM(s) Oral at bedtime  chlorhexidine 2% Cloths 1 Application(s) Topical daily  famotidine    Tablet 20 milliGRAM(s) Oral daily  heparin   Injectable 5000 Unit(s) SubCutaneous every 12 hours  mycophenolate mofetil 1 Gram(s) Oral every 12 hours  nystatin    Suspension 010877 Unit(s) Swish and Swallow four times a day  polyethylene glycol 3350 17 Gram(s) Oral daily  predniSONE   Tablet   Oral   senna 2 Tablet(s) Oral at bedtime  trimethoprim   80 mG/sulfamethoxazole 400 mG 1 Tablet(s) Oral daily  valGANciclovir 900 milliGRAM(s) Oral <User Schedule>    PRN Inpatient Medications  ondansetron Injectable 4 milliGRAM(s) IV Push every 6 hours PRN  traMADol 25 milliGRAM(s) Oral every 8 hours PRN  traMADol 50 milliGRAM(s) Oral every 8 hours PRN      REVIEW OF SYSTEMS  --------------------------------------------------------------------------------  Gen: No fevers/chills  Respiratory: No dyspnea, cough,   CV: No chest pain, PND, orthopnea  GI: No abdominal pain, diarrhea, constipation, nausea, vomiting  Transplant: No pain  : No increased frequency, dysuria, hematuria   MSK: No edema  Neuro: No dizziness/lightheadedness    All other systems were reviewed and are negative, except as noted.    VITALS/PHYSICAL EXAM  --------------------------------------------------------------------------------  T(C): 36.9 (11-08-24 @ 13:00), Max: 36.9 (11-08-24 @ 09:00)  HR: 94 (11-08-24 @ 13:00) (76 - 96)  BP: 141/85 (11-08-24 @ 13:00) (131/87 - 151/99)  RR: 18 (11-08-24 @ 13:00) (16 - 18)  SpO2: 99% (11-08-24 @ 13:00) (96% - 100%)  Wt(kg): --        11-07-24 @ 07:01  -  11-08-24 @ 07:00  --------------------------------------------------------  IN: 1040 mL / OUT: 2495 mL / NET: -1455 mL      Physical Exam:  Gen: NAD, able to speak in full sentences   HEENT: PERRL, MMM   Pulm: CTA B/L, no crackles   CV: RRR, S1S2+  Abd: +BS, soft  Transplant: No tenderness, swelling  : No suprapubic tenderness  MSK: no edema   Psych: Normal affect and mood  Skin: Warm    LABS/STUDIES  --------------------------------------------------------------------------------              8.2    5.09  >-----------<  201      [11-08-24 @ 06:33]              27.1     138  |  107  |  26  ----------------------------<  148      [11-08-24 @ 12:49]  4.3   |  19  |  1.59        Ca     8.9     [11-08-24 @ 12:49]      Mg     2.2     [11-08-24 @ 06:33]      Phos  2.0     [11-08-24 @ 06:33]    TPro  6.6  /  Alb  3.6  /  TBili  0.2  /  DBili  x   /  AST  10  /  ALT  <5  /  AlkPhos  47  [11-08-24 @ 12:49]        Uric acid 3.6      [11-08-24 @ 06:33]    Creatinine Trend:  SCr 1.59 [11-08 @ 12:49]  SCr 1.61 [11-08 @ 06:33]  SCr 1.56 [11-07 @ 07:11]  SCr 2.05 [11-06 @ 06:37]  SCr 3.04 [11-05 @ 06:47]    Tacrolimus (), Serum: 3.6 ng/mL (11-08 @ 06:33)  Tacrolimus (), Serum: 4.1 ng/mL (11-07 @ 07:11)  Tacrolimus (), Serum: 4.5 ng/mL (11-06 @ 06:36)            Urine Creatinine 69      [11-08-24 @ 06:32]  Urine Protein 107      [11-08-24 @ 06:32]      HBsAb 15.7      [11-04-24 @ 07:06]  HBsAg Nonreact      [11-04-24 @ 07:05]  HBcAb Nonreact      [11-04-24 @ 07:06]  HCV 0.05, Nonreact      [11-04-24 @ 07:05]  HIV Nonreact      [11-04-24 @ 07:06]

## 2024-11-08 NOTE — DISCHARGE NOTE PROVIDER - PROVIDER TOKENS
PROVIDER:[TOKEN:[[id number]]],PROVIDER:[TOKEN:[[id number]]] PROVIDER:[TOKEN:[[id number]]],PROVIDER:[TOKEN:[[id number]]],PROVIDER:[TOKEN:[[id number]]],PROVIDER:[TOKEN:[[id number]]]

## 2024-11-08 NOTE — PROGRESS NOTE ADULT - PROVIDER SPECIALTY LIST ADULT
Pharmacy
Transplant Nephrology
Transplant Surgery
Transplant Surgery
Transplant Nephrology
Transplant Surgery
Nephrology
Nephrology
Transplant Nephrology
Transplant Surgery
Transplant Surgery

## 2024-11-08 NOTE — DISCHARGE NOTE PROVIDER - HOSPITAL COURSE
35yo RHD female with advanced CKD since 10/ 2015, FSIG diagnosed in 2016 currently on Farxiga now s/p LDRT (1a/1v/1u+stent) on 11/04/2024 under Thymo induction. She was evaluated by the multi-disciplinary team including surgeon, nephrologist, ACP, pharmacist, nutrition, social work, physical therapy and nursing and deemed stable for discharge with the following plan:    [] LDRT   - Renal US: patent   - good graft function and urine output   - Cr on discharge ---    [] Immunosuppression  - thymo induction completed   - Envarsus---, MMF 1/1, steroid taper  - ppx: nystatin, Bactrim, Valcyte    [] HLD/HTN   - continue Lipitor 20mg qd  - continue Norvasc 10mg qd       Follow up at Transplant Clinic in 1 week   Follow up in 4-6 weeks for ureteral stent removal      33yo RHD female with advanced CKD since 10/ 2015, FSIG diagnosed in 2016 currently on Farxiga now s/p LDRT (1a/1v/1u+stent) on 11/04/2024 under Thymo induction. She was evaluated by the multi-disciplinary team including surgeon, nephrologist, ACP, pharmacist, nutrition, social work, physical therapy and nursing and deemed stable for discharge with the following plan:    [] LDRT   - Renal US: patent   - good graft function and urine output   - Cr on discharge ---    [] Immunosuppression  - thymo induction completed   - Envarsus 8, MMF 1/1, steroid taper  - ppx: nystatin, Bactrim, Valcyte    [] HLD/HTN   - continue Lipitor 20mg qd  - continue Norvasc 10mg qd       Follow up at Transplant Clinic in 1 week   Follow up in 4-6 weeks for ureteral stent removal      35yo RHD female with advanced CKD since 10/ 2015, FSIG diagnosed in 2016 currently on Farxiga now s/p LDRT (1a/1v/1u+stent) on 11/04/2024 under Thymo induction. She was evaluated by the multi-disciplinary team including surgeon, nephrologist, ACP, pharmacist, nutrition, social work, physical therapy and nursing and deemed stable for discharge with the following plan:    [] LDRT   - Renal US: patent   - good graft function and urine output   - Cr on discharge 1.59    [] Immunosuppression  - Thymo induction completed   - Envarsus 8, MMF 1/1, steroid taper  - ppx: nystatin, Bactrim, Valcyte, pepcid    [] HLD/HTN   - continue Lipitor 20mg qd  - off home norvasc, BPs within appropriate goals for post-transplant.      Follow up at Transplant Clinic in 1 week   Follow up with Dr. Morales in 4-6 weeks for ureteral stent removal      33yo RHD female with advanced CKD since 10/ 2015, FSIG diagnosed in 2016 currently on Farxiga now s/p LDRT (1a/1v/1u+stent) on 11/04/2024 under Thymo induction. She was evaluated by the multi-disciplinary team including surgeon, nephrologist, ACP, pharmacist, nutrition, social work, physical therapy and nursing and deemed stable for discharge with the following plan:    [] LDRT   - Renal US: patent   - good graft function and urine output   - Cr on discharge 1.59    [] Immunosuppression  - Thymo induction completed   - Envarsus 8, MMF 1/1, steroid taper  - ppx: nystatin, Bactrim, Valcyte, pepcid    [] HLD/HTN   - continue Lipitor 20mg qd  - off home norvasc, BPs within appropriate goals for post-transplant.      Follow up at Transplant Clinic on Wednesday 11/13 with Dr. Chapman  Follow up with Dr. Morales in 4-6 weeks for ureteral stent removal

## 2024-11-08 NOTE — PROGRESS NOTE ADULT - ASSESSMENT
35yo RHD female with advanced CKD since 10/ 2015, FSIG diagnosed in 2016 currently on Farxiga now s/p LDRT (1a/1v/1u+stent) on 11/04/2024 under Thymo induction     OR: EBL 50    [] LDRT POD #4  - Renal US: patent   - Strict I/O's  - Regular Diet    - SQH  - c/w lipitor 20mg  - Pain management  - Bowel regimen  - SCD/PT/IS  - check daily urine cr/protein: repeat CMP at noon to review Cr, if stable can d/c home today  -  Mildly anemic: epo 10Kx1    [] Immunosuppression  - thymo induction completed   - Envarsus per level , MMF 1/1, steroid taper  - ppx: nystatin, Bactrim, Valcyte

## 2024-11-08 NOTE — PROGRESS NOTE ADULT - ASSESSMENT
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 Patient with kidney biopsy done in 2015 ( at [facility]) with FSGS, was on steroids for 2-3 years, Patient underwent LDRT with renal transplant on 11/4/24, on the RLQ under steroid and Thymo induction.     1. s/p LDRT on 11/4/24 in the RLQ  Anatomy: 1V / 1A/ 1 U (stented)  Induction: steroid and Thymo induction.  Patient Scr ranged from 4.9 to 6.7 since Feb 2024. On admission, Scr elevated at 6.09, today Scr is at 1.59  24H UOP: non oliguric  Urine protein noted to be elevated at >1000 mg/dl and UPCR elevated at 5.5 in 11/2023. Most recent Urine Protein was 300mg/dl and UPCR 3.0 from 9/2024. UPCR 1.4 on 11/5. UPCR 2.0 on 11/7  Post txp Renal US showed well perfused renal transplant and no peritransplant fluid collection. No tardus parvus waveforms.   continue PO intake     2. IS   Induction: steroid and Thymo induction.  Envarsus by lvl, MMF 1/1, steroid taper - tacro level today is 3.6. Will continue to monitor tacro levels daily - 30 mins before the morning dose.   PPX: nystatin, Bactrim, Valcyte    3. HTN  patient was on olmesartan and amlodipine at home  Can hold BP meds as patient is Post op    4. HLD on statin    If you have any questions, please feel free to contact me  Maxwell Kaiser MD  Nephrology Fellow  Page [phone] / Microsoft Teams (Preferred); Please PAGE for urgent consults only.  (After 5pm or on weekends please page the on-call fellow)

## 2024-11-08 NOTE — PROGRESS NOTE ADULT - ASSESSMENT
34 year old  RHD female  with advanced CKD since 10/ 2015 , FSGS diagnosed in 2016 currently on Farxiga and Ozempic  Thymo induction   Metabolic acidosis(presumed)  Anemia   Subnephrotic range proteinuria       1 Renal- Serum creatinine coming down nicely at present;    Trend serum creatinine which is improving     gasca is out   PO Neutraphos or increase diary intake   2 Transplant- Thymo induction and on Envarus and Cellcept   SP IV Solumederol and Prednisone per xplant   Bactrim for 9 months and Valcyte for 6 months   3 Anemia- Retacrit for anemia    Ill see in 6 months     Sayed [facility]   [phone]

## 2024-11-08 NOTE — DISCHARGE NOTE NURSING/CASE MANAGEMENT/SOCIAL WORK - NSDCFUADDAPPT_GEN_ALL_CORE_FT
Follow up at Transplant Clinic on Wednesday 11/13 with Dr. Chapman  Follow up with Dr. Morales in 4-6 weeks for ureteral stent removal

## 2024-11-08 NOTE — DISCHARGE NOTE PROVIDER - NSDCFUADDAPPT_GEN_ALL_CORE_FT
Follow up at Transplant Clinic in 1 week   Follow up in 4-6 weeks for ureteral stent removal  Follow up at Transplant Clinic in 1 week   Follow up with Dr. Morales in 4-6 weeks for ureteral stent removal  Follow up at Transplant Clinic on Wednesday 11/13 with Dr. Chapman  Follow up with Dr. Morales in 4-6 weeks for ureteral stent removal

## 2024-11-08 NOTE — PROGRESS NOTE ADULT - SUBJECTIVE AND OBJECTIVE BOX
Transplant Surgery - Multidisciplinary Rounds   --------------------------------------------------------------   Tx Date: 11/4/2024        POD#4    Present: Patient seen and examined with multidisciplinary Transplant team including Surgeon Dr. Delgado, Nephrologist, Dr. Chau, Surgical/Hepatology fellow, JEFF Wesley,  Pharmacist, Nutritionist,  and bedside RN during AM rounds.   Disciplines not in attendance will be notified of the plan.     HPI: 33yo RHD female with advanced CKD since 10/ 2015, FSIG diagnosed in 2016 currently on Farxiga now s/p Open living donor kidney transplant recipient 11/04/2024.    Interval Events:  - afebrile, VSS  - d/c'd gasca yesterday, passed TOV  - urine Pro/Cr ratio 1.6, good UO, Cr 1.61    Immunosupression:  Induction: thymo   Maintenance: FK per level /MMF/SST  Ongoing monitoring for signs of rejection     Potential Discharge date: TBD  Education:  Medications  Plan of care:  See Below        MEDICATIONS  (STANDING):  atorvastatin 20 milliGRAM(s) Oral at bedtime  chlorhexidine 2% Cloths 1 Application(s) Topical daily  famotidine    Tablet 20 milliGRAM(s) Oral daily  heparin   Injectable 5000 Unit(s) SubCutaneous every 12 hours  mycophenolate mofetil 1 Gram(s) Oral every 12 hours  nystatin    Suspension 537182 Unit(s) Swish and Swallow four times a day  polyethylene glycol 3350 17 Gram(s) Oral daily  predniSONE   Tablet   Oral   senna 2 Tablet(s) Oral at bedtime  trimethoprim   80 mG/sulfamethoxazole 400 mG 1 Tablet(s) Oral daily  valGANciclovir 900 milliGRAM(s) Oral <User Schedule>    MEDICATIONS  (PRN):  ondansetron Injectable 4 milliGRAM(s) IV Push every 6 hours PRN Nausea and/or Vomiting  traMADol 25 milliGRAM(s) Oral every 8 hours PRN Moderate Pain (4 - 6)  traMADol 50 milliGRAM(s) Oral every 8 hours PRN Severe Pain (7 - 10)      PAST MEDICAL & SURGICAL HISTORY:  FSGS (focal segmental glomerulosclerosis)      CKD (chronic kidney disease)      Back pain      KEN (obstructive sleep apnea)      S/P arteriovenous (AV) fistula creation          Vital Signs Last 24 Hrs  T(C): 36.9 (08 Nov 2024 13:00), Max: 36.9 (08 Nov 2024 09:00)  T(F): 98.5 (08 Nov 2024 13:00), Max: 98.5 (08 Nov 2024 09:00)  HR: 94 (08 Nov 2024 13:00) (76 - 96)  BP: 141/85 (08 Nov 2024 13:00) (131/87 - 151/99)  BP(mean): --  RR: 18 (08 Nov 2024 13:00) (16 - 18)  SpO2: 99% (08 Nov 2024 13:00) (96% - 100%)    Parameters below as of 08 Nov 2024 13:00  Patient On (Oxygen Delivery Method): room air        I&O's Summary    07 Nov 2024 07:01  -  08 Nov 2024 07:00  --------------------------------------------------------  IN: 1040 mL / OUT: 2495 mL / NET: -1455 mL                              8.2    5.09  )-----------( 201      ( 08 Nov 2024 06:33 )             27.1     11-08    138  |  107  |  26[H]  ----------------------------<  148[H]  4.3   |  19[L]  |  1.59[H]    Ca    8.9      08 Nov 2024 12:49  Phos  2.0     11-08  Mg     2.2     11-08    TPro  6.6  /  Alb  3.6  /  TBili  0.2  /  DBili  x   /  AST  10  /  ALT  <5[L]  /  AlkPhos  47  11-08    Tacrolimus (), Serum: 3.6 ng/mL (11-08 @ 06:33)            Review of systems  Gen: No weight changes, fatigue, fevers/chills, weakness  Skin: No rashes  Head/Eyes/Ears/Mouth: No headache; Normal hearing; Normal vision w/o blurriness; No sinus pain/discomfort, sore throat  Respiratory: No dyspnea, cough, wheezing, hemoptysis  CV: No chest pain, PND, orthopnea  GI: minimal abdominal pain at surgical site, No diarrhea, constipation, nausea, vomiting, melena, hematochezia  : No increased frequency, dysuria, hematuria, nocturia  MSK: No joint pain/swelling; no back pain; no edema  Neuro: No dizziness/lightheadedness, weakness, seizures, numbness, tingling  Heme: No easy bruising or bleeding  Endo: No heat/cold intolerance  Psych: No significant nervousness, anxiety, stress, depression  All other systems were reviewed and are negative, except as noted.      PHYSICAL EXAM:  Constitutional: Well developed / well nourished  Eyes: Anicteric, PERRLA  ENMT: nc/at  Neck: supple  Respiratory: CTA B/L, no tachypnea  Cardiovascular: RRR  Gastrointestinal: Soft, non distended, mild tenderness at the incision site; incision closed with stitches, c/d/i  Genitourinary: Voiding spontaneously  Extremities: SCD's in place and working bilaterally  Vascular: Palpable dp pulses bilaterally  Neurological: A&Ox4  Skin: no rashes, ulcerations or lesions;  Musculoskeletal: Moving all extremities  Psychiatric: Responsive     Transplant Surgery - Multidisciplinary Rounds   --------------------------------------------------------------   Tx Date: 11/4/2024        POD#4    Present: Patient seen and examined with multidisciplinary Transplant team including Surgeon Dr. Delgado, Nephrologist, Dr. Chau, JEFF Wesley,  Pharmacist, Nutritionist,  and bedside RN during AM rounds.   Disciplines not in attendance will be notified of the plan.     HPI: 35yo RHD female with advanced CKD since 10/ 2015, FSGS diagnosed in 2016 currently on Farxiga now s/p Open living donor kidney transplant recipient 11/04/2024.    Interval Events:  - afebrile, VSS  - d/c'd gasca yesterday, passed TOV  - urine Pro/Cr ratio 1.6, good UO, Cr 1.61    Immunosupression:  Induction: thymo   Maintenance: FK per level /MMF/SST  Ongoing monitoring for signs of rejection     Potential Discharge date: TBD  Education:  Medications  Plan of care:  See Below        MEDICATIONS  (STANDING):  atorvastatin 20 milliGRAM(s) Oral at bedtime  chlorhexidine 2% Cloths 1 Application(s) Topical daily  famotidine    Tablet 20 milliGRAM(s) Oral daily  heparin   Injectable 5000 Unit(s) SubCutaneous every 12 hours  mycophenolate mofetil 1 Gram(s) Oral every 12 hours  nystatin    Suspension 291879 Unit(s) Swish and Swallow four times a day  polyethylene glycol 3350 17 Gram(s) Oral daily  predniSONE   Tablet   Oral   senna 2 Tablet(s) Oral at bedtime  trimethoprim   80 mG/sulfamethoxazole 400 mG 1 Tablet(s) Oral daily  valGANciclovir 900 milliGRAM(s) Oral <User Schedule>    MEDICATIONS  (PRN):  ondansetron Injectable 4 milliGRAM(s) IV Push every 6 hours PRN Nausea and/or Vomiting  traMADol 25 milliGRAM(s) Oral every 8 hours PRN Moderate Pain (4 - 6)  traMADol 50 milliGRAM(s) Oral every 8 hours PRN Severe Pain (7 - 10)      PAST MEDICAL & SURGICAL HISTORY:  FSGS (focal segmental glomerulosclerosis)      CKD (chronic kidney disease)      Back pain      KEN (obstructive sleep apnea)      S/P arteriovenous (AV) fistula creation          Vital Signs Last 24 Hrs  T(C): 36.9 (08 Nov 2024 13:00), Max: 36.9 (08 Nov 2024 09:00)  T(F): 98.5 (08 Nov 2024 13:00), Max: 98.5 (08 Nov 2024 09:00)  HR: 94 (08 Nov 2024 13:00) (76 - 96)  BP: 141/85 (08 Nov 2024 13:00) (131/87 - 151/99)  BP(mean): --  RR: 18 (08 Nov 2024 13:00) (16 - 18)  SpO2: 99% (08 Nov 2024 13:00) (96% - 100%)    Parameters below as of 08 Nov 2024 13:00  Patient On (Oxygen Delivery Method): room air        I&O's Summary    07 Nov 2024 07:01  -  08 Nov 2024 07:00  --------------------------------------------------------  IN: 1040 mL / OUT: 2495 mL / NET: -1455 mL                              8.2    5.09  )-----------( 201      ( 08 Nov 2024 06:33 )             27.1     11-08    138  |  107  |  26[H]  ----------------------------<  148[H]  4.3   |  19[L]  |  1.59[H]    Ca    8.9      08 Nov 2024 12:49  Phos  2.0     11-08  Mg     2.2     11-08    TPro  6.6  /  Alb  3.6  /  TBili  0.2  /  DBili  x   /  AST  10  /  ALT  <5[L]  /  AlkPhos  47  11-08    Tacrolimus (), Serum: 3.6 ng/mL (11-08 @ 06:33)            Review of systems  Gen: No weight changes, fatigue, fevers/chills, weakness  Skin: No rashes  Head/Eyes/Ears/Mouth: No headache; Normal hearing; Normal vision w/o blurriness; No sinus pain/discomfort, sore throat  Respiratory: No dyspnea, cough, wheezing, hemoptysis  CV: No chest pain, PND, orthopnea  GI: minimal abdominal pain at surgical site, No diarrhea, constipation, nausea, vomiting, melena, hematochezia  : No increased frequency, dysuria, hematuria, nocturia  MSK: No joint pain/swelling; no back pain; no edema  Neuro: No dizziness/lightheadedness, weakness, seizures, numbness, tingling  Heme: No easy bruising or bleeding  Endo: No heat/cold intolerance  Psych: No significant nervousness, anxiety, stress, depression  All other systems were reviewed and are negative, except as noted.      PHYSICAL EXAM:  Constitutional: Well developed / well nourished  Eyes: Anicteric, PERRLA  ENMT: nc/at  Neck: supple  Respiratory: CTA B/L, no tachypnea  Cardiovascular: RRR  Gastrointestinal: Soft, non distended, mild tenderness at the incision site; incision closed with stitches, c/d/i  Genitourinary: Voiding spontaneously  Extremities: SCD's in place and working bilaterally  Vascular: Palpable dp pulses bilaterally  Neurological: A&Ox4  Skin: no rashes, ulcerations or lesions;  Musculoskeletal: Moving all extremities  Psychiatric: Responsive     Transplant Surgery - Multidisciplinary Rounds   --------------------------------------------------------------   Tx Date: 11/4/2024        POD#4    Present: Patient seen and examined with multidisciplinary Transplant team including Surgeon Dr. Delgado, Nephrologist, Dr. Chau, JEFF Wesley,  Pharmacist, Nutritionist,  and bedside RN during AM rounds.   Disciplines not in attendance will be notified of the plan.     HPI: 35yo RHD female with advanced CKD since 10/ 2015, FSGS diagnosed in 2016 currently on Farxiga now s/p Open living donor kidney transplant recipient 1a/1v/1u +stent 11/04/2024.    Interval Events:  - afebrile, VSS  - d/c'd gasca yesterday, passed TOV  - urine Pro/Cr ratio 1.6, good UO, Cr 1.61    Immunosupression:  Induction: thymo   Maintenance: FK per level /MMF/SST  Ongoing monitoring for signs of rejection     Potential Discharge date: TBD  Education:  Medications  Plan of care:  See Below        MEDICATIONS  (STANDING):  atorvastatin 20 milliGRAM(s) Oral at bedtime  chlorhexidine 2% Cloths 1 Application(s) Topical daily  famotidine    Tablet 20 milliGRAM(s) Oral daily  heparin   Injectable 5000 Unit(s) SubCutaneous every 12 hours  mycophenolate mofetil 1 Gram(s) Oral every 12 hours  nystatin    Suspension 743138 Unit(s) Swish and Swallow four times a day  polyethylene glycol 3350 17 Gram(s) Oral daily  predniSONE   Tablet   Oral   senna 2 Tablet(s) Oral at bedtime  trimethoprim   80 mG/sulfamethoxazole 400 mG 1 Tablet(s) Oral daily  valGANciclovir 900 milliGRAM(s) Oral <User Schedule>    MEDICATIONS  (PRN):  ondansetron Injectable 4 milliGRAM(s) IV Push every 6 hours PRN Nausea and/or Vomiting  traMADol 25 milliGRAM(s) Oral every 8 hours PRN Moderate Pain (4 - 6)  traMADol 50 milliGRAM(s) Oral every 8 hours PRN Severe Pain (7 - 10)      PAST MEDICAL & SURGICAL HISTORY:  FSGS (focal segmental glomerulosclerosis)      CKD (chronic kidney disease)      Back pain      KEN (obstructive sleep apnea)      S/P arteriovenous (AV) fistula creation          Vital Signs Last 24 Hrs  T(C): 36.9 (08 Nov 2024 13:00), Max: 36.9 (08 Nov 2024 09:00)  T(F): 98.5 (08 Nov 2024 13:00), Max: 98.5 (08 Nov 2024 09:00)  HR: 94 (08 Nov 2024 13:00) (76 - 96)  BP: 141/85 (08 Nov 2024 13:00) (131/87 - 151/99)  BP(mean): --  RR: 18 (08 Nov 2024 13:00) (16 - 18)  SpO2: 99% (08 Nov 2024 13:00) (96% - 100%)    Parameters below as of 08 Nov 2024 13:00  Patient On (Oxygen Delivery Method): room air        I&O's Summary    07 Nov 2024 07:01  -  08 Nov 2024 07:00  --------------------------------------------------------  IN: 1040 mL / OUT: 2495 mL / NET: -1455 mL                              8.2    5.09  )-----------( 201      ( 08 Nov 2024 06:33 )             27.1     11-08    138  |  107  |  26[H]  ----------------------------<  148[H]  4.3   |  19[L]  |  1.59[H]    Ca    8.9      08 Nov 2024 12:49  Phos  2.0     11-08  Mg     2.2     11-08    TPro  6.6  /  Alb  3.6  /  TBili  0.2  /  DBili  x   /  AST  10  /  ALT  <5[L]  /  AlkPhos  47  11-08    Tacrolimus (), Serum: 3.6 ng/mL (11-08 @ 06:33)            Review of systems  Gen: No weight changes, fatigue, fevers/chills, weakness  Skin: No rashes  Head/Eyes/Ears/Mouth: No headache; Normal hearing; Normal vision w/o blurriness; No sinus pain/discomfort, sore throat  Respiratory: No dyspnea, cough, wheezing, hemoptysis  CV: No chest pain, PND, orthopnea  GI: minimal abdominal pain at surgical site, No diarrhea, constipation, nausea, vomiting, melena, hematochezia  : No increased frequency, dysuria, hematuria, nocturia  MSK: No joint pain/swelling; no back pain; no edema  Neuro: No dizziness/lightheadedness, weakness, seizures, numbness, tingling  Heme: No easy bruising or bleeding  Endo: No heat/cold intolerance  Psych: No significant nervousness, anxiety, stress, depression  All other systems were reviewed and are negative, except as noted.      PHYSICAL EXAM:  Constitutional: Well developed / well nourished  Eyes: Anicteric, PERRLA  ENMT: nc/at  Neck: supple  Respiratory: CTA B/L, no tachypnea  Cardiovascular: RRR  Gastrointestinal: Soft, non distended, mild tenderness at the incision site; incision closed with stitches, c/d/i  Genitourinary: Voiding spontaneously  Extremities: SCD's in place and working bilaterally  Vascular: Palpable dp pulses bilaterally  Neurological: A&Ox4  Skin: no rashes, ulcerations or lesions;  Musculoskeletal: Moving all extremities  Psychiatric: Responsive

## 2024-11-08 NOTE — DISCHARGE NOTE PROVIDER - NSDCCPCAREPLAN_GEN_ALL_CORE_FT
PRINCIPAL DISCHARGE DIAGNOSIS  Diagnosis: S/P kidney transplant  Assessment and Plan of Treatment: Avoid heavy lifting aything over 5lbs for six weeks following your surgery date. Do NOT drive a car or operate machinery unless cleared by your transplant surgeon during your follow up visit. Avoid straining, use stool softners as needed. Stop stool softners if diarrhea develops.   - Call transplant clinic if you develop fever, increased abdominal pain, redness/swelling or bleeding around your incision site  - Call transplant clinic if you have difficulty urinating, notice decrease in urine output or blood in urine.   - Follow FOOD SAFETY instructions provided to you in your patient education guide booklet   - Bathing: Shower is allowed, you can let soap and water flow over your incision; do NOT rub the area. Bath is NOT allowed until your incision is healed and you have been cleared by your transplant surgeon.   Immunosupression  - Transplant recipients are at risk for developing infection because immune system is lowered due to transplant medications.   - Avoid contact with sick people; practice good hand hygiene;   - Take medications as directed by your translant team. Never stop taking medications unless instructed by your transplant physician.  - Do NOT double up medication dose if you missed your dose; call the transplant office for instructions.    Rejection:  It is very important to take madications that suppress your immune system to protect your kidney from rejection.   Never stop taking these medications unless specifically advised by your transplant physician.   UTI  Practice good personal hygiene. Drink plenty of fluids to flush bacteria out of urinary tract.   Emtpy your bladder completely as soon as you feel the urge or every few hours.   Complete antibiotic course as prescribed.   Hyperkalemia  Hyperkalemia may develop slowly over weeks to months. It can reoccur and become ongoing.   Symptoms, if any, are usually mild and non specific and may include: fatigue, muscle weakness, numbness, or tingling.   Follow a low potassium diet. Most common High potassium foods include

## 2024-11-08 NOTE — DISCHARGE NOTE NURSING/CASE MANAGEMENT/SOCIAL WORK - FINANCIAL ASSISTANCE
[facility] provides services at a reduced cost to those who are determined to be eligible through [facility]’s financial assistance program. Information regarding [facility]’s financial assistance program can be found by going to [email or web address] or by calling [phone].

## 2024-11-08 NOTE — DISCHARGE NOTE PROVIDER - NSRESEARCHGRANT_MLMHIDDEN_GEN_A_CORE
Colonoscopy Procedure Note      Procedure   Colonoscopy with cold biopsy polypectomy    Preoperative Diagnosis    History of colon polyps    Postoperative Diagnosis   Cecal polyp sigmoid diverticulosis    Indications   Carmelita Guerra is a 64 year old female presenting with need for repeat colonoscopy secondary to prior history of polyps    The risks, benefits, and details of the procedure were explained to the patient.  The patient verbalized understanding and wanted to proceed. Informed written consent was obtained.    Sedation   Monitored anesthesia with glucagon    Pre-Procedure Physical   Patient's medications, allergies, past medical, surgical, social and family histories were reviewed and updated as appropriate.      ASA (American society of anesthesiologists) Class   1    Procedure Details   Informed consent was obtained for the procedure, including sedation.  Risks of perforation, hemorrhage, adverse drug reaction and aspiration were discussed. The patient was placed in the left lateral decubitus position.  Based on the pre-procedure assessment, including review of the patient's medical history, medications, allergies, and review of systems, she had been deemed to be an appropriate candidate for conscious sedation; she was therefore sedated with the medications listed below.   The patient was monitored continuously with ECG tracing, pulse oximetry, blood pressure monitoring, and direct observations.    A rectal examination was performed.  The colonoscope was inserted into the rectum and advanced under direct vision to the cecum .  The quality of the colonic preparation was adequate mostly [place] bowel prep level III throughout.  A careful inspection was made as the colonoscope was withdrawn, including a retroflexed view of the rectum; findings and interventions are described below.  Appropriate photodocumentation was obtained.    Findings   The colonoscope was introduced and navigated through a very redundant  sigmoid colon with significant tortuosity requiring very careful advancement through multiple diverticuli with scope passed to the hepatic flexure counterpressure allowed us to intubate the cecum. One small under 1 cm polyp was identified removed with cold technique with minimal oozing and the scope was withdrawn showing no additional abnormalities with photographs taken. As a scope was pulled back patient had a moderate amount of persisting spasm principally in the left colon this required re-advancement multiple times however no distinct luminal abnormalities outside of the diverticuli were seen and on withdrawal significant internal hemorrhoids were seen on retroflexion    Specimens   Cecal polyp    Complications   None    Disposition   Home    Condition   Stable    Impression   1. Cecal polyp internal hemorrhoids    Recommendations   1. Pathology pending       yes

## 2024-11-11 PROBLEM — G47.33 OBSTRUCTIVE SLEEP APNEA (ADULT) (PEDIATRIC): Chronic | Status: ACTIVE | Noted: 2024-10-24

## 2024-11-11 PROBLEM — M54.9 DORSALGIA, UNSPECIFIED: Chronic | Status: ACTIVE | Noted: 2024-10-24

## 2024-11-11 PROBLEM — N18.9 CHRONIC KIDNEY DISEASE, UNSPECIFIED: Chronic | Status: ACTIVE | Noted: 2024-10-24

## 2024-11-12 ENCOUNTER — NON-APPOINTMENT (OUTPATIENT)
Age: 34
End: 2024-11-12

## 2024-11-13 ENCOUNTER — APPOINTMENT (OUTPATIENT)
Dept: NEPHROLOGY | Facility: CLINIC | Age: 34
End: 2024-11-13
Payer: COMMERCIAL

## 2024-11-13 ENCOUNTER — LABORATORY RESULT (OUTPATIENT)
Age: 34
End: 2024-11-13

## 2024-11-13 VITALS
HEART RATE: 96 BPM | BODY MASS INDEX: 35.16 KG/M2 | WEIGHT: 232 LBS | HEIGHT: 68 IN | TEMPERATURE: 97.8 F | SYSTOLIC BLOOD PRESSURE: 114 MMHG | DIASTOLIC BLOOD PRESSURE: 77 MMHG | OXYGEN SATURATION: 100 %

## 2024-11-13 LAB
ALBUMIN SERPL ELPH-MCNC: 3.7 G/DL
ALP BLD-CCNC: 59 U/L
ALT SERPL-CCNC: 5 U/L
ANION GAP SERPL CALC-SCNC: 13 MMOL/L
AST SERPL-CCNC: 13 U/L
BILIRUB SERPL-MCNC: 0.4 MG/DL
BUN SERPL-MCNC: 22 MG/DL
CALCIUM SERPL-MCNC: 9.1 MG/DL
CALCIUM SERPL-MCNC: 9.1 MG/DL
CHLORIDE SERPL-SCNC: 102 MMOL/L
CO2 SERPL-SCNC: 20 MMOL/L
CREAT SERPL-MCNC: 1.71 MG/DL
CREAT SPEC-SCNC: 236 MG/DL
CREAT/PROT UR: 0.4 RATIO
EGFR: 40 ML/MIN/1.73M2
GLUCOSE SERPL-MCNC: 98 MG/DL
HCT VFR BLD CALC: 31.6 %
HGB BLD-MCNC: 9.8 G/DL
MAGNESIUM SERPL-MCNC: 1.6 MG/DL
MCHC RBC-ENTMCNC: 25.3 PG
MCHC RBC-ENTMCNC: 31 G/DL
MCV RBC AUTO: 81.7 FL
PARATHYROID HORMONE INTACT: 64 PG/ML
PHOSPHATE SERPL-MCNC: 3.1 MG/DL
PLATELET # BLD AUTO: 361 K/UL
POTASSIUM SERPL-SCNC: 4.2 MMOL/L
PROT SERPL-MCNC: 6.5 G/DL
PROT UR-MCNC: 93 MG/DL
RBC # BLD: 3.87 M/UL
RBC # FLD: 16 %
SODIUM SERPL-SCNC: 136 MMOL/L
TACROLIMUS SERPL-MCNC: 14.4 NG/ML
URATE SERPL-MCNC: 3.5 MG/DL
WBC # FLD AUTO: 11.57 K/UL

## 2024-11-13 PROCEDURE — 99205 OFFICE O/P NEW HI 60 MIN: CPT

## 2024-11-13 PROCEDURE — 99215 OFFICE O/P EST HI 40 MIN: CPT

## 2024-11-13 RX ORDER — TACROLIMUS 4 MG/1
4 TABLET, EXTENDED RELEASE ORAL DAILY
Qty: 1 | Refills: 11 | Status: ACTIVE | COMMUNITY
Start: 2024-11-05

## 2024-11-13 RX ORDER — TACROLIMUS 1 MG/1
1 TABLET, EXTENDED RELEASE ORAL DAILY
Qty: 180 | Refills: 3 | Status: ACTIVE | COMMUNITY
Start: 2024-11-05

## 2024-11-14 LAB
APPEARANCE: ABNORMAL
BILIRUBIN URINE: NEGATIVE
BLOOD URINE: ABNORMAL
COLOR: YELLOW
GLUCOSE QUALITATIVE U: NEGATIVE MG/DL
KETONES URINE: NEGATIVE MG/DL
LEUKOCYTE ESTERASE URINE: NEGATIVE
NITRITE URINE: NEGATIVE
PH URINE: 6
PROTEIN URINE: 100 MG/DL
SPECIFIC GRAVITY URINE: >1.03
UROBILINOGEN URINE: 0.2 MG/DL

## 2024-11-15 ENCOUNTER — NON-APPOINTMENT (OUTPATIENT)
Age: 34
End: 2024-11-15

## 2024-11-19 LAB — BKV DNA SPEC QL NAA+PROBE: NOT DETECTED IU/ML

## 2024-11-20 ENCOUNTER — LABORATORY RESULT (OUTPATIENT)
Age: 34
End: 2024-11-20

## 2024-11-20 ENCOUNTER — APPOINTMENT (OUTPATIENT)
Dept: TRANSPLANT | Facility: CLINIC | Age: 34
End: 2024-11-20

## 2024-11-20 VITALS
DIASTOLIC BLOOD PRESSURE: 83 MMHG | OXYGEN SATURATION: 99 % | RESPIRATION RATE: 18 BRPM | HEART RATE: 82 BPM | WEIGHT: 228 LBS | SYSTOLIC BLOOD PRESSURE: 121 MMHG | BODY MASS INDEX: 34.56 KG/M2 | HEIGHT: 68 IN | TEMPERATURE: 97.3 F

## 2024-11-20 PROCEDURE — 99214 OFFICE O/P EST MOD 30 MIN: CPT

## 2024-11-20 PROCEDURE — 99204 OFFICE O/P NEW MOD 45 MIN: CPT

## 2024-11-21 LAB
ALBUMIN SERPL ELPH-MCNC: 3.9 G/DL
ALP BLD-CCNC: 62 U/L
ALT SERPL-CCNC: 6 U/L
ANION GAP SERPL CALC-SCNC: 12 MMOL/L
APPEARANCE: ABNORMAL
AST SERPL-CCNC: 9 U/L
BASOPHILS # BLD AUTO: 0.05 K/UL
BASOPHILS NFR BLD AUTO: 0.7 %
BILIRUB SERPL-MCNC: 0.2 MG/DL
BILIRUBIN URINE: NEGATIVE
BLOOD URINE: ABNORMAL
BUN SERPL-MCNC: 21 MG/DL
CALCIUM SERPL-MCNC: 9.4 MG/DL
CHLORIDE SERPL-SCNC: 105 MMOL/L
CMV DNA SPEC QL NAA+PROBE: NOT DETECTED IU/ML
CMVPCR LOG: NOT DETECTED LOG10IU/ML
CO2 SERPL-SCNC: 24 MMOL/L
COLOR: YELLOW
CREAT SERPL-MCNC: 1.55 MG/DL
CREAT SPEC-SCNC: 222 MG/DL
CREAT/PROT UR: 0.3 RATIO
EGFR: 45 ML/MIN/1.73M2
EOSINOPHIL # BLD AUTO: 0.17 K/UL
EOSINOPHIL NFR BLD AUTO: 2.4 %
GLUCOSE QUALITATIVE U: NEGATIVE MG/DL
GLUCOSE SERPL-MCNC: 96 MG/DL
HCT VFR BLD CALC: 30.2 %
HGB BLD-MCNC: 9.1 G/DL
IMM GRANULOCYTES NFR BLD AUTO: 1.5 %
KETONES URINE: NEGATIVE MG/DL
LDH SERPL-CCNC: 181 U/L
LEUKOCYTE ESTERASE URINE: ABNORMAL
LYMPHOCYTES # BLD AUTO: 0.24 K/UL
LYMPHOCYTES NFR BLD AUTO: 3.3 %
MAGNESIUM SERPL-MCNC: 1.5 MG/DL
MAN DIFF?: NORMAL
MCHC RBC-ENTMCNC: 24.9 PG
MCHC RBC-ENTMCNC: 30.1 G/DL
MCV RBC AUTO: 82.7 FL
MONOCYTES # BLD AUTO: 0.16 K/UL
MONOCYTES NFR BLD AUTO: 2.2 %
NEUTROPHILS # BLD AUTO: 6.5 K/UL
NEUTROPHILS NFR BLD AUTO: 89.9 %
NITRITE URINE: NEGATIVE
PH URINE: 6
PHOSPHATE SERPL-MCNC: 2.7 MG/DL
PLATELET # BLD AUTO: 377 K/UL
POTASSIUM SERPL-SCNC: 4.6 MMOL/L
PROT SERPL-MCNC: 6.5 G/DL
PROT UR-MCNC: 64 MG/DL
PROTEIN URINE: 100 MG/DL
RBC # BLD: 3.65 M/UL
RBC # FLD: 16.2 %
SODIUM SERPL-SCNC: 140 MMOL/L
SPECIFIC GRAVITY URINE: 1.03
TACROLIMUS SERPL-MCNC: 11.5 NG/ML
URATE SERPL-MCNC: 3.2 MG/DL
UROBILINOGEN URINE: 0.2 MG/DL
WBC # FLD AUTO: 7.23 K/UL

## 2024-11-27 ENCOUNTER — LABORATORY RESULT (OUTPATIENT)
Age: 34
End: 2024-11-27

## 2024-11-27 ENCOUNTER — APPOINTMENT (OUTPATIENT)
Dept: NEPHROLOGY | Facility: CLINIC | Age: 34
End: 2024-11-27
Payer: COMMERCIAL

## 2024-11-27 VITALS
RESPIRATION RATE: 18 BRPM | WEIGHT: 231 LBS | DIASTOLIC BLOOD PRESSURE: 84 MMHG | OXYGEN SATURATION: 99 % | HEART RATE: 76 BPM | SYSTOLIC BLOOD PRESSURE: 117 MMHG | HEIGHT: 68 IN | BODY MASS INDEX: 35.01 KG/M2 | TEMPERATURE: 97.8 F

## 2024-11-27 LAB
ALBUMIN SERPL ELPH-MCNC: 4 G/DL
ALP BLD-CCNC: 68 U/L
ALT SERPL-CCNC: 6 U/L
ANION GAP SERPL CALC-SCNC: 11 MMOL/L
APPEARANCE: ABNORMAL
AST SERPL-CCNC: 7 U/L
BILIRUB SERPL-MCNC: 0.3 MG/DL
BILIRUBIN URINE: NEGATIVE
BLOOD URINE: ABNORMAL
BUN SERPL-MCNC: 17 MG/DL
CALCIUM SERPL-MCNC: 9.3 MG/DL
CHLORIDE SERPL-SCNC: 105 MMOL/L
CO2 SERPL-SCNC: 23 MMOL/L
COLOR: YELLOW
CREAT SERPL-MCNC: 1.48 MG/DL
CREAT SPEC-SCNC: 287 MG/DL
CREAT/PROT UR: 0.3 RATIO
EGFR: 48 ML/MIN/1.73M2
GLUCOSE QUALITATIVE U: NEGATIVE MG/DL
GLUCOSE SERPL-MCNC: 90 MG/DL
KETONES URINE: NEGATIVE MG/DL
LDH SERPL-CCNC: 152 U/L
LEUKOCYTE ESTERASE URINE: ABNORMAL
MAGNESIUM SERPL-MCNC: 1.5 MG/DL
NITRITE URINE: NEGATIVE
PH URINE: 5.5
PHOSPHATE SERPL-MCNC: 2.8 MG/DL
POTASSIUM SERPL-SCNC: 4.1 MMOL/L
PROT SERPL-MCNC: 6.4 G/DL
PROT UR-MCNC: 72 MG/DL
PROTEIN URINE: 100 MG/DL
SODIUM SERPL-SCNC: 138 MMOL/L
SPECIFIC GRAVITY URINE: 1.02
TACROLIMUS SERPL-MCNC: 10.3 NG/ML
URATE SERPL-MCNC: 3.1 MG/DL
UROBILINOGEN URINE: 0.2 MG/DL

## 2024-11-27 PROCEDURE — 99215 OFFICE O/P EST HI 40 MIN: CPT

## 2024-11-27 RX ORDER — OMEGA-3/DHA/EPA/FISH OIL 300-1000MG
400 CAPSULE ORAL
Qty: 60 | Refills: 3 | Status: ACTIVE | COMMUNITY
Start: 2024-11-27 | End: 1900-01-01

## 2024-11-29 LAB
BASOPHILS # BLD AUTO: 0 K/UL
BASOPHILS NFR BLD AUTO: 0 %
BKV DNA SPEC QL NAA+PROBE: 153 IU/ML
CMV DNA SPEC QL NAA+PROBE: NOT DETECTED IU/ML
CMVPCR LOG: NOT DETECTED LOG10IU/ML
EOSINOPHIL # BLD AUTO: 0.24 K/UL
EOSINOPHIL NFR BLD AUTO: 5.3 %
HCT VFR BLD CALC: 28.8 %
HGB BLD-MCNC: 9.1 G/DL
LYMPHOCYTES # BLD AUTO: 0.2 K/UL
LYMPHOCYTES NFR BLD AUTO: 4.4 %
MAN DIFF?: NORMAL
MCHC RBC-ENTMCNC: 25.3 PG
MCHC RBC-ENTMCNC: 31.6 G/DL
MCV RBC AUTO: 80.2 FL
MONOCYTES # BLD AUTO: 0 K/UL
MONOCYTES NFR BLD AUTO: 0 %
NEUTROPHILS # BLD AUTO: 3.96 K/UL
NEUTROPHILS NFR BLD AUTO: 88.6 %
PLATELET # BLD AUTO: 345 K/UL
RBC # BLD: 3.59 M/UL
RBC # FLD: 15.9 %
WBC # FLD AUTO: 4.47 K/UL

## 2024-12-03 ENCOUNTER — APPOINTMENT (OUTPATIENT)
Dept: CARDIOLOGY | Facility: CLINIC | Age: 34
End: 2024-12-03

## 2024-12-04 ENCOUNTER — APPOINTMENT (OUTPATIENT)
Dept: NEPHROLOGY | Facility: CLINIC | Age: 34
End: 2024-12-04
Payer: COMMERCIAL

## 2024-12-04 ENCOUNTER — LABORATORY RESULT (OUTPATIENT)
Age: 34
End: 2024-12-04

## 2024-12-04 VITALS
BODY MASS INDEX: 35.92 KG/M2 | HEIGHT: 68 IN | HEART RATE: 80 BPM | WEIGHT: 237 LBS | DIASTOLIC BLOOD PRESSURE: 84 MMHG | RESPIRATION RATE: 18 BRPM | SYSTOLIC BLOOD PRESSURE: 107 MMHG | OXYGEN SATURATION: 100 % | TEMPERATURE: 98.1 F

## 2024-12-04 LAB
ALBUMIN SERPL ELPH-MCNC: 3.9 G/DL
ALP BLD-CCNC: 62 U/L
ALT SERPL-CCNC: 6 U/L
ANION GAP SERPL CALC-SCNC: 11 MMOL/L
AST SERPL-CCNC: 7 U/L
BASOPHILS # BLD AUTO: 0.02 K/UL
BASOPHILS NFR BLD AUTO: 0.5 %
BILIRUB SERPL-MCNC: 0.2 MG/DL
BUN SERPL-MCNC: 19 MG/DL
CALCIUM SERPL-MCNC: 9.2 MG/DL
CHLORIDE SERPL-SCNC: 105 MMOL/L
CO2 SERPL-SCNC: 23 MMOL/L
CREAT SERPL-MCNC: 1.61 MG/DL
EGFR: 43 ML/MIN/1.73M2
EOSINOPHIL # BLD AUTO: 0.15 K/UL
EOSINOPHIL NFR BLD AUTO: 3.7 %
GLUCOSE SERPL-MCNC: 91 MG/DL
HCT VFR BLD CALC: 29.8 %
HGB BLD-MCNC: 9 G/DL
IMM GRANULOCYTES NFR BLD AUTO: 2 %
LDH SERPL-CCNC: 146 U/L
LYMPHOCYTES # BLD AUTO: 0.24 K/UL
LYMPHOCYTES NFR BLD AUTO: 5.9 %
MAGNESIUM SERPL-MCNC: 1.6 MG/DL
MAN DIFF?: NORMAL
MCHC RBC-ENTMCNC: 25 PG
MCHC RBC-ENTMCNC: 30.2 G/DL
MCV RBC AUTO: 82.8 FL
MONOCYTES # BLD AUTO: 0.19 K/UL
MONOCYTES NFR BLD AUTO: 4.7 %
NEUTROPHILS # BLD AUTO: 3.37 K/UL
NEUTROPHILS NFR BLD AUTO: 83.2 %
PHOSPHATE SERPL-MCNC: 3.1 MG/DL
PLATELET # BLD AUTO: 347 K/UL
POTASSIUM SERPL-SCNC: 4.1 MMOL/L
PROT SERPL-MCNC: 6.3 G/DL
RBC # BLD: 3.6 M/UL
RBC # FLD: 16.4 %
SODIUM SERPL-SCNC: 140 MMOL/L
TACROLIMUS SERPL-MCNC: 8.8 NG/ML
URATE SERPL-MCNC: 3.2 MG/DL
WBC # FLD AUTO: 4.05 K/UL

## 2024-12-04 PROCEDURE — 99215 OFFICE O/P EST HI 40 MIN: CPT

## 2024-12-05 ENCOUNTER — NON-APPOINTMENT (OUTPATIENT)
Age: 34
End: 2024-12-05

## 2024-12-05 LAB
APPEARANCE: ABNORMAL
BILIRUBIN URINE: NEGATIVE
BLOOD URINE: ABNORMAL
COLOR: YELLOW
CREAT SPEC-SCNC: 257 MG/DL
CREAT/PROT UR: 0.4 RATIO
GLUCOSE QUALITATIVE U: NEGATIVE MG/DL
KETONES URINE: NEGATIVE MG/DL
LEUKOCYTE ESTERASE URINE: ABNORMAL
NITRITE URINE: NEGATIVE
PH URINE: 6
PROT UR-MCNC: 97 MG/DL
PROTEIN URINE: 100 MG/DL
SPECIFIC GRAVITY URINE: 1.03
UROBILINOGEN URINE: 1 MG/DL

## 2024-12-09 ENCOUNTER — LABORATORY RESULT (OUTPATIENT)
Age: 34
End: 2024-12-09

## 2024-12-09 ENCOUNTER — APPOINTMENT (OUTPATIENT)
Dept: TRANSPLANT | Facility: CLINIC | Age: 34
End: 2024-12-09
Payer: COMMERCIAL

## 2024-12-09 PROCEDURE — 52310 CYSTOSCOPY AND TREATMENT: CPT

## 2024-12-10 ENCOUNTER — NON-APPOINTMENT (OUTPATIENT)
Age: 34
End: 2024-12-10

## 2024-12-10 LAB
ALBUMIN SERPL ELPH-MCNC: 4.3 G/DL
ALP BLD-CCNC: 69 U/L
ALT SERPL-CCNC: 6 U/L
ANION GAP SERPL CALC-SCNC: 10 MMOL/L
APPEARANCE: CLEAR
AST SERPL-CCNC: 10 U/L
BASOPHILS # BLD AUTO: 0.06 K/UL
BASOPHILS NFR BLD AUTO: 0.9 %
BILIRUB SERPL-MCNC: 0.2 MG/DL
BILIRUBIN URINE: NEGATIVE
BLOOD URINE: NEGATIVE
BUN SERPL-MCNC: 14 MG/DL
CALCIUM SERPL-MCNC: 9.4 MG/DL
CALCIUM SERPL-MCNC: 9.4 MG/DL
CHLORIDE SERPL-SCNC: 103 MMOL/L
CO2 SERPL-SCNC: 24 MMOL/L
COLOR: YELLOW
CREAT SERPL-MCNC: 1.64 MG/DL
CREAT SPEC-SCNC: 170 MG/DL
CREAT/PROT UR: 0.4 RATIO
EGFR: 42 ML/MIN/1.73M2
EOSINOPHIL # BLD AUTO: 0.06 K/UL
EOSINOPHIL NFR BLD AUTO: 0.9 %
GLUCOSE QUALITATIVE U: NEGATIVE MG/DL
GLUCOSE SERPL-MCNC: 100 MG/DL
HCT VFR BLD CALC: 31.2 %
HGB BLD-MCNC: 9.5 G/DL
KETONES URINE: NEGATIVE MG/DL
LEUKOCYTE ESTERASE URINE: NEGATIVE
LYMPHOCYTES # BLD AUTO: 0.3 K/UL
LYMPHOCYTES NFR BLD AUTO: 4.3 %
MAGNESIUM SERPL-MCNC: 1.6 MG/DL
MAN DIFF?: NORMAL
MCHC RBC-ENTMCNC: 25.1 PG
MCHC RBC-ENTMCNC: 30.4 G/DL
MCV RBC AUTO: 82.3 FL
MONOCYTES # BLD AUTO: 0.18 K/UL
MONOCYTES NFR BLD AUTO: 2.6 %
NEUTROPHILS # BLD AUTO: 6.45 K/UL
NEUTROPHILS NFR BLD AUTO: 91.3 %
NITRITE URINE: NEGATIVE
PARATHYROID HORMONE INTACT: 78 PG/ML
PH URINE: 6
PHOSPHATE SERPL-MCNC: 2.4 MG/DL
PLATELET # BLD AUTO: 356 K/UL
POTASSIUM SERPL-SCNC: 4.9 MMOL/L
PROT SERPL-MCNC: 6.8 G/DL
PROT UR-MCNC: 61 MG/DL
PROTEIN URINE: 30 MG/DL
RBC # BLD: 3.79 M/UL
RBC # FLD: 16.2 %
SODIUM SERPL-SCNC: 137 MMOL/L
SPECIFIC GRAVITY URINE: 1.02
TACROLIMUS SERPL-MCNC: 10.2 NG/ML
UROBILINOGEN URINE: 0.2 MG/DL
WBC # FLD AUTO: 7.06 K/UL

## 2024-12-11 LAB — BKV DNA SPEC QL NAA+PROBE: 113 IU/ML

## 2024-12-12 ENCOUNTER — NON-APPOINTMENT (OUTPATIENT)
Age: 34
End: 2024-12-12

## 2024-12-12 LAB — BKV DNA SPEC QL NAA+PROBE: 231 IU/ML

## 2024-12-18 ENCOUNTER — LABORATORY RESULT (OUTPATIENT)
Age: 34
End: 2024-12-18

## 2024-12-18 ENCOUNTER — APPOINTMENT (OUTPATIENT)
Dept: NEPHROLOGY | Facility: CLINIC | Age: 34
End: 2024-12-18
Payer: COMMERCIAL

## 2024-12-18 VITALS
SYSTOLIC BLOOD PRESSURE: 132 MMHG | HEIGHT: 68 IN | DIASTOLIC BLOOD PRESSURE: 91 MMHG | OXYGEN SATURATION: 99 % | RESPIRATION RATE: 18 BRPM | WEIGHT: 240 LBS | HEART RATE: 76 BPM | BODY MASS INDEX: 36.37 KG/M2

## 2024-12-18 PROCEDURE — 99215 OFFICE O/P EST HI 40 MIN: CPT

## 2024-12-19 ENCOUNTER — NON-APPOINTMENT (OUTPATIENT)
Age: 34
End: 2024-12-19

## 2024-12-19 LAB
ALBUMIN SERPL ELPH-MCNC: 4 G/DL
ALP BLD-CCNC: 57 U/L
ALT SERPL-CCNC: 5 U/L
ANION GAP SERPL CALC-SCNC: 13 MMOL/L
APPEARANCE: CLEAR
AST SERPL-CCNC: 7 U/L
BASOPHILS # BLD AUTO: 0.02 K/UL
BASOPHILS NFR BLD AUTO: 0.5 %
BILIRUB SERPL-MCNC: <0.2 MG/DL
BILIRUBIN URINE: NEGATIVE
BLOOD URINE: ABNORMAL
BUN SERPL-MCNC: 24 MG/DL
CALCIUM SERPL-MCNC: 9.2 MG/DL
CALCIUM SERPL-MCNC: 9.2 MG/DL
CHLORIDE SERPL-SCNC: 102 MMOL/L
CO2 SERPL-SCNC: 23 MMOL/L
COLOR: YELLOW
CREAT SERPL-MCNC: 1.6 MG/DL
CREAT SPEC-SCNC: 159 MG/DL
CREAT/PROT UR: 0.2 RATIO
EGFR: 44 ML/MIN/1.73M2
EOSINOPHIL # BLD AUTO: 0.07 K/UL
EOSINOPHIL NFR BLD AUTO: 1.8 %
GLUCOSE QUALITATIVE U: NEGATIVE MG/DL
GLUCOSE SERPL-MCNC: 92 MG/DL
HCT VFR BLD CALC: 30.3 %
HCV RNA SERPL NAA+PROBE-LOG IU: NOT DETECTED LOGIU/ML
HEPB DNA PCR INT: NOT DETECTED
HEPB DNA PCR LOG: NOT DETECTED LOGIU/ML
HEPC RNA INTERP: NOT DETECTED
HGB BLD-MCNC: 9 G/DL
HIV1 RNA # SERPL NAA+PROBE: NORMAL
HIV1 RNA # SERPL NAA+PROBE: NORMAL COPIES/ML
HIV1+2 AB SPEC QL IA.RAPID: NONREACTIVE
IMM GRANULOCYTES NFR BLD AUTO: 1 %
KETONES URINE: NEGATIVE MG/DL
LEUKOCYTE ESTERASE URINE: NEGATIVE
LYMPHOCYTES # BLD AUTO: 0.3 K/UL
LYMPHOCYTES NFR BLD AUTO: 7.7 %
MAGNESIUM SERPL-MCNC: 1.4 MG/DL
MAN DIFF?: NORMAL
MCHC RBC-ENTMCNC: 25.2 PG
MCHC RBC-ENTMCNC: 29.7 G/DL
MCV RBC AUTO: 84.9 FL
MONOCYTES # BLD AUTO: 0.16 K/UL
MONOCYTES NFR BLD AUTO: 4.1 %
NEUTROPHILS # BLD AUTO: 3.32 K/UL
NEUTROPHILS NFR BLD AUTO: 84.9 %
NITRITE URINE: NEGATIVE
PARATHYROID HORMONE INTACT: 92 PG/ML
PH URINE: 5.5
PHOSPHATE SERPL-MCNC: 3 MG/DL
PLATELET # BLD AUTO: 348 K/UL
POTASSIUM SERPL-SCNC: 4.7 MMOL/L
PROT SERPL-MCNC: 6.6 G/DL
PROT UR-MCNC: 29 MG/DL
PROTEIN URINE: 30 MG/DL
RBC # BLD: 3.57 M/UL
RBC # FLD: 16.2 %
SODIUM SERPL-SCNC: 138 MMOL/L
SPECIFIC GRAVITY URINE: 1.02
TACROLIMUS SERPL-MCNC: 8.6 NG/ML
UROBILINOGEN URINE: 0.2 MG/DL
VIRAL LOAD INTERP: NORMAL
VIRAL LOAD LOG: NORMAL LG COP/ML
WBC # FLD AUTO: 3.91 K/UL

## 2024-12-20 LAB
HBV SURFACE AG SER QL: NONREACTIVE
HCV AB SER QL: NONREACTIVE
HCV S/CO RATIO: 0.07 S/CO

## 2024-12-23 ENCOUNTER — NON-APPOINTMENT (OUTPATIENT)
Age: 34
End: 2024-12-23

## 2024-12-23 LAB — BKV DNA SPEC QL NAA+PROBE: 600 IU/ML

## 2025-01-02 ENCOUNTER — APPOINTMENT (OUTPATIENT)
Dept: NEPHROLOGY | Facility: CLINIC | Age: 35
End: 2025-01-02
Payer: COMMERCIAL

## 2025-01-02 ENCOUNTER — LABORATORY RESULT (OUTPATIENT)
Age: 35
End: 2025-01-02

## 2025-01-02 VITALS
WEIGHT: 247 LBS | HEIGHT: 68 IN | DIASTOLIC BLOOD PRESSURE: 88 MMHG | SYSTOLIC BLOOD PRESSURE: 134 MMHG | OXYGEN SATURATION: 99 % | HEART RATE: 81 BPM | BODY MASS INDEX: 37.44 KG/M2 | TEMPERATURE: 98 F

## 2025-01-02 DIAGNOSIS — Z94.0 KIDNEY TRANSPLANT STATUS: ICD-10-CM

## 2025-01-02 LAB
ALBUMIN SERPL ELPH-MCNC: 4.3 G/DL
ALP BLD-CCNC: 56 U/L
ALT SERPL-CCNC: 7 U/L
ANION GAP SERPL CALC-SCNC: 9 MMOL/L
APPEARANCE: CLEAR
AST SERPL-CCNC: 8 U/L
BACTERIA: ABNORMAL /HPF
BASOPHILS # BLD AUTO: 0.04 K/UL
BASOPHILS NFR BLD AUTO: 0.8 %
BILIRUB SERPL-MCNC: 0.2 MG/DL
BILIRUBIN URINE: NEGATIVE
BLOOD URINE: ABNORMAL
BUN SERPL-MCNC: 18 MG/DL
CALCIUM SERPL-MCNC: 9.2 MG/DL
CAST: 1 /LPF
CHLORIDE SERPL-SCNC: 106 MMOL/L
CO2 SERPL-SCNC: 24 MMOL/L
COLOR: YELLOW
CREAT SERPL-MCNC: 1.48 MG/DL
CREAT SPEC-SCNC: 191 MG/DL
CREAT/PROT UR: 0.2 RATIO
EGFR: 48 ML/MIN/1.73M2
EOSINOPHIL # BLD AUTO: 0.12 K/UL
EOSINOPHIL NFR BLD AUTO: 2.3 %
EPITHELIAL CELLS: 8 /HPF
GLUCOSE QUALITATIVE U: NEGATIVE MG/DL
GLUCOSE SERPL-MCNC: 88 MG/DL
HCT VFR BLD CALC: 28.2 %
HGB BLD-MCNC: 8.7 G/DL
IMM GRANULOCYTES NFR BLD AUTO: 0.8 %
KETONES URINE: NEGATIVE MG/DL
LEUKOCYTE ESTERASE URINE: NEGATIVE
LYMPHOCYTES # BLD AUTO: 0.34 K/UL
LYMPHOCYTES NFR BLD AUTO: 6.5 %
MAGNESIUM SERPL-MCNC: 1.9 MG/DL
MAN DIFF?: NORMAL
MCHC RBC-ENTMCNC: 25.4 PG
MCHC RBC-ENTMCNC: 30.9 G/DL
MCV RBC AUTO: 82.2 FL
MICROSCOPIC-UA: NORMAL
MONOCYTES # BLD AUTO: 0.27 K/UL
MONOCYTES NFR BLD AUTO: 5.2 %
NEUTROPHILS # BLD AUTO: 4.4 K/UL
NEUTROPHILS NFR BLD AUTO: 84.4 %
NITRITE URINE: NEGATIVE
PH URINE: 5.5
PHOSPHATE SERPL-MCNC: 2.8 MG/DL
PLATELET # BLD AUTO: 349 K/UL
POTASSIUM SERPL-SCNC: 4.4 MMOL/L
PROT SERPL-MCNC: 6.5 G/DL
PROT UR-MCNC: 42 MG/DL
PROTEIN URINE: 30 MG/DL
RBC # BLD: 3.43 M/UL
RBC # FLD: 15.6 %
RED BLOOD CELLS URINE: 2 /HPF
SODIUM SERPL-SCNC: 140 MMOL/L
SPECIFIC GRAVITY URINE: 1.02
TACROLIMUS SERPL-MCNC: 7.2 NG/ML
UROBILINOGEN URINE: 0.2 MG/DL
WBC # FLD AUTO: 5.21 K/UL
WHITE BLOOD CELLS URINE: 3 /HPF

## 2025-01-02 PROCEDURE — 99215 OFFICE O/P EST HI 40 MIN: CPT

## 2025-01-02 RX ORDER — ERYTHROPOIETIN 10000 [IU]/ML
10000 INJECTION, SOLUTION INTRAVENOUS; SUBCUTANEOUS
Qty: 4 | Refills: 0 | Status: DISCONTINUED | COMMUNITY
Start: 2025-01-02 | End: 2025-01-02

## 2025-01-02 RX ORDER — EPOETIN ALFA-EPBX 10000 [IU]/ML
10000 INJECTION, SOLUTION INTRAVENOUS; SUBCUTANEOUS
Qty: 4 | Refills: 0 | Status: ACTIVE | COMMUNITY
Start: 2025-01-02 | End: 1900-01-01

## 2025-01-02 RX ORDER — AMLODIPINE BESYLATE 2.5 MG/1
2.5 TABLET ORAL DAILY
Qty: 30 | Refills: 11 | Status: ACTIVE | COMMUNITY
Start: 2025-01-02 | End: 1900-01-01

## 2025-01-06 LAB — BKV DNA SPEC QL NAA+PROBE: ABNORMAL IU/ML

## 2025-01-15 ENCOUNTER — LABORATORY RESULT (OUTPATIENT)
Age: 35
End: 2025-01-15

## 2025-01-15 ENCOUNTER — APPOINTMENT (OUTPATIENT)
Dept: NEPHROLOGY | Facility: CLINIC | Age: 35
End: 2025-01-15
Payer: COMMERCIAL

## 2025-01-15 VITALS
TEMPERATURE: 97.8 F | HEIGHT: 68 IN | HEART RATE: 74 BPM | OXYGEN SATURATION: 100 % | SYSTOLIC BLOOD PRESSURE: 122 MMHG | DIASTOLIC BLOOD PRESSURE: 83 MMHG | BODY MASS INDEX: 38.49 KG/M2 | WEIGHT: 254 LBS | RESPIRATION RATE: 18 BRPM

## 2025-01-15 PROCEDURE — 99215 OFFICE O/P EST HI 40 MIN: CPT

## 2025-01-16 DIAGNOSIS — Z94.0 KIDNEY TRANSPLANT STATUS: ICD-10-CM

## 2025-01-16 LAB
ALBUMIN SERPL ELPH-MCNC: 4.3 G/DL
ALP BLD-CCNC: 62 U/L
ALT SERPL-CCNC: 5 U/L
ANION GAP SERPL CALC-SCNC: 10 MMOL/L
APPEARANCE: ABNORMAL
AST SERPL-CCNC: 11 U/L
BACTERIA: ABNORMAL /HPF
BASOPHILS # BLD AUTO: 0.04 K/UL
BASOPHILS NFR BLD AUTO: 0.9 %
BILIRUB SERPL-MCNC: 0.2 MG/DL
BILIRUBIN URINE: NEGATIVE
BKV DNA SPEC QL NAA+PROBE: 900 IU/ML
BLOOD URINE: NEGATIVE
BUN SERPL-MCNC: 18 MG/DL
CALCIUM SERPL-MCNC: 9.5 MG/DL
CAST: 0 /LPF
CHLORIDE SERPL-SCNC: 103 MMOL/L
CO2 SERPL-SCNC: 25 MMOL/L
COLOR: YELLOW
CREAT SERPL-MCNC: 1.51 MG/DL
CREAT SPEC-SCNC: 128 MG/DL
CREAT/PROT UR: 0.2 RATIO
EGFR: 47 ML/MIN/1.73M2
EOSINOPHIL # BLD AUTO: 0.07 K/UL
EOSINOPHIL NFR BLD AUTO: 1.5 %
EPITHELIAL CELLS: 21 /HPF
FERRITIN SERPL-MCNC: 18 NG/ML
FOLATE SERPL-MCNC: 7.6 NG/ML
GLUCOSE QUALITATIVE U: NEGATIVE MG/DL
GLUCOSE SERPL-MCNC: 102 MG/DL
HAPTOGLOB SERPL-MCNC: 174 MG/DL
HCT VFR BLD CALC: 30.2 %
HGB BLD-MCNC: 9.2 G/DL
IMM GRANULOCYTES NFR BLD AUTO: 0.7 %
IRON SATN MFR SERPL: 9 %
IRON SERPL-MCNC: 28 UG/DL
KETONES URINE: NEGATIVE MG/DL
LDH SERPL-CCNC: 189 U/L
LEUKOCYTE ESTERASE URINE: NEGATIVE
LYMPHOCYTES # BLD AUTO: 0.35 K/UL
LYMPHOCYTES NFR BLD AUTO: 7.6 %
MAGNESIUM SERPL-MCNC: 1.6 MG/DL
MAN DIFF?: NORMAL
MCHC RBC-ENTMCNC: 25.1 PG
MCHC RBC-ENTMCNC: 30.5 G/DL
MCV RBC AUTO: 82.3 FL
MICROSCOPIC-UA: NORMAL
MONOCYTES # BLD AUTO: 0.24 K/UL
MONOCYTES NFR BLD AUTO: 5.2 %
NEUTROPHILS # BLD AUTO: 3.86 K/UL
NEUTROPHILS NFR BLD AUTO: 84.1 %
NITRITE URINE: NEGATIVE
PH URINE: 6.5
PHOSPHATE SERPL-MCNC: 3.1 MG/DL
PLATELET # BLD AUTO: 368 K/UL
POTASSIUM SERPL-SCNC: 4.4 MMOL/L
PROT SERPL-MCNC: 6.6 G/DL
PROT UR-MCNC: 28 MG/DL
PROTEIN URINE: 30 MG/DL
RBC # BLD: 3.67 M/UL
RBC # FLD: 15.1 %
RED BLOOD CELLS URINE: 0 /HPF
REVIEW: NORMAL
SODIUM SERPL-SCNC: 139 MMOL/L
SPECIFIC GRAVITY URINE: 1.02
TACROLIMUS SERPL-MCNC: 5.2 NG/ML
TIBC SERPL-MCNC: 330 UG/DL
UIBC SERPL-MCNC: 302 UG/DL
UROBILINOGEN URINE: 0.2 MG/DL
VIT B12 SERPL-MCNC: 359 PG/ML
WBC # FLD AUTO: 4.59 K/UL
WHITE BLOOD CELLS URINE: 2 /HPF

## 2025-01-16 RX ORDER — CHLORHEXIDINE GLUCONATE 4 %
325 (65 FE) LIQUID (ML) TOPICAL
Qty: 30 | Refills: 11 | Status: ACTIVE | COMMUNITY
Start: 2025-01-16 | End: 1900-01-01

## 2025-01-21 LAB — B19V DNA FLD QL NAA+PROBE: NOT DETECTED IU/ML

## 2025-01-28 DIAGNOSIS — Z94.0 KIDNEY TRANSPLANT STATUS: ICD-10-CM

## 2025-01-29 ENCOUNTER — LABORATORY RESULT (OUTPATIENT)
Age: 35
End: 2025-01-29

## 2025-01-29 ENCOUNTER — APPOINTMENT (OUTPATIENT)
Dept: NEPHROLOGY | Facility: CLINIC | Age: 35
End: 2025-01-29
Payer: COMMERCIAL

## 2025-01-29 VITALS
HEART RATE: 91 BPM | WEIGHT: 249 LBS | BODY MASS INDEX: 37.74 KG/M2 | SYSTOLIC BLOOD PRESSURE: 125 MMHG | HEIGHT: 68 IN | RESPIRATION RATE: 18 BRPM | TEMPERATURE: 98 F | OXYGEN SATURATION: 98 % | DIASTOLIC BLOOD PRESSURE: 80 MMHG

## 2025-01-29 PROCEDURE — 99214 OFFICE O/P EST MOD 30 MIN: CPT

## 2025-01-30 ENCOUNTER — NON-APPOINTMENT (OUTPATIENT)
Age: 35
End: 2025-01-30

## 2025-01-30 LAB
ALBUMIN SERPL ELPH-MCNC: 4.5 G/DL
ALP BLD-CCNC: 63 U/L
ALT SERPL-CCNC: 6 U/L
ANION GAP SERPL CALC-SCNC: 13 MMOL/L
APPEARANCE: CLEAR
AST SERPL-CCNC: 12 U/L
BACTERIA: ABNORMAL /HPF
BASOPHILS # BLD AUTO: 0 K/UL
BASOPHILS NFR BLD AUTO: 0 %
BILIRUB SERPL-MCNC: 0.2 MG/DL
BILIRUBIN URINE: NEGATIVE
BLOOD URINE: ABNORMAL
BUN SERPL-MCNC: 19 MG/DL
CALCIUM SERPL-MCNC: 9.4 MG/DL
CAST: 2 /LPF
CHLORIDE SERPL-SCNC: 105 MMOL/L
CO2 SERPL-SCNC: 24 MMOL/L
COLOR: YELLOW
CREAT SERPL-MCNC: 1.41 MG/DL
CREAT SPEC-SCNC: 195 MG/DL
CREAT/PROT UR: 0.3 RATIO
EGFR: 51 ML/MIN/1.73M2
EOSINOPHIL # BLD AUTO: 0.04 K/UL
EOSINOPHIL NFR BLD AUTO: 0.9 %
EPITHELIAL CELLS: 8 /HPF
GLUCOSE QUALITATIVE U: NEGATIVE MG/DL
GLUCOSE SERPL-MCNC: 106 MG/DL
HCT VFR BLD CALC: 31.6 %
HGB BLD-MCNC: 9.4 G/DL
KETONES URINE: NEGATIVE MG/DL
LEUKOCYTE ESTERASE URINE: ABNORMAL
LYMPHOCYTES # BLD AUTO: 0.43 K/UL
LYMPHOCYTES NFR BLD AUTO: 10.2 %
MAGNESIUM SERPL-MCNC: 1.7 MG/DL
MAN DIFF?: NORMAL
MCHC RBC-ENTMCNC: 24.9 PG
MCHC RBC-ENTMCNC: 29.7 G/DL
MCV RBC AUTO: 83.6 FL
MICROSCOPIC-UA: NORMAL
MONOCYTES # BLD AUTO: 0.24 K/UL
MONOCYTES NFR BLD AUTO: 5.6 %
NEUTROPHILS # BLD AUTO: 3.5 K/UL
NEUTROPHILS NFR BLD AUTO: 83.3 %
NITRITE URINE: NEGATIVE
PH URINE: 5.5
PHOSPHATE SERPL-MCNC: 3 MG/DL
PLATELET # BLD AUTO: 362 K/UL
POTASSIUM SERPL-SCNC: 4.1 MMOL/L
PROT SERPL-MCNC: 6.9 G/DL
PROT UR-MCNC: 55 MG/DL
PROTEIN URINE: 100 MG/DL
RBC # BLD: 3.78 M/UL
RBC # FLD: 15.3 %
RED BLOOD CELLS URINE: 1 /HPF
SODIUM SERPL-SCNC: 142 MMOL/L
SPECIFIC GRAVITY URINE: 1.02
TACROLIMUS SERPL-MCNC: 5.7 NG/ML
UROBILINOGEN URINE: 0.2 MG/DL
WBC # FLD AUTO: 4.2 K/UL
WHITE BLOOD CELLS URINE: 5 /HPF

## 2025-02-03 LAB — BKV DNA SPEC QL NAA+PROBE: ABNORMAL IU/ML

## 2025-03-04 DIAGNOSIS — Z94.0 KIDNEY TRANSPLANT STATUS: ICD-10-CM

## 2025-03-05 ENCOUNTER — APPOINTMENT (OUTPATIENT)
Dept: NEPHROLOGY | Facility: CLINIC | Age: 35
End: 2025-03-05
Payer: COMMERCIAL

## 2025-03-05 ENCOUNTER — LABORATORY RESULT (OUTPATIENT)
Age: 35
End: 2025-03-05

## 2025-03-05 VITALS
TEMPERATURE: 98 F | WEIGHT: 254 LBS | OXYGEN SATURATION: 99 % | RESPIRATION RATE: 18 BRPM | DIASTOLIC BLOOD PRESSURE: 80 MMHG | HEIGHT: 68 IN | HEART RATE: 79 BPM | SYSTOLIC BLOOD PRESSURE: 118 MMHG | BODY MASS INDEX: 38.49 KG/M2

## 2025-03-05 PROCEDURE — 99215 OFFICE O/P EST HI 40 MIN: CPT

## 2025-03-06 ENCOUNTER — NON-APPOINTMENT (OUTPATIENT)
Age: 35
End: 2025-03-06

## 2025-03-06 LAB
ALBUMIN SERPL ELPH-MCNC: 4.3 G/DL
ALP BLD-CCNC: 59 U/L
ALT SERPL-CCNC: 10 U/L
ANION GAP SERPL CALC-SCNC: 12 MMOL/L
APPEARANCE: ABNORMAL
AST SERPL-CCNC: 17 U/L
BACTERIA: ABNORMAL /HPF
BASOPHILS # BLD AUTO: 0.07 K/UL
BASOPHILS NFR BLD AUTO: 2.7 %
BILIRUB SERPL-MCNC: 0.2 MG/DL
BILIRUBIN URINE: NEGATIVE
BLOOD URINE: ABNORMAL
BUN SERPL-MCNC: 16 MG/DL
CALCIUM SERPL-MCNC: 9.4 MG/DL
CAST: 1 /LPF
CHLORIDE SERPL-SCNC: 106 MMOL/L
CO2 SERPL-SCNC: 25 MMOL/L
COLOR: YELLOW
CREAT SERPL-MCNC: 1.53 MG/DL
CREAT SPEC-SCNC: 138 MG/DL
CREAT/PROT UR: 0.6 RATIO
EGFRCR SERPLBLD CKD-EPI 2021: 46 ML/MIN/1.73M2
EOSINOPHIL # BLD AUTO: 0.09 K/UL
EOSINOPHIL NFR BLD AUTO: 3.6 %
EPITHELIAL CELLS: 14 /HPF
GLUCOSE QUALITATIVE U: NEGATIVE MG/DL
GLUCOSE SERPL-MCNC: 91 MG/DL
HCT VFR BLD CALC: 35.6 %
HGB BLD-MCNC: 10.9 G/DL
KETONES URINE: NEGATIVE MG/DL
LEUKOCYTE ESTERASE URINE: NEGATIVE
LYMPHOCYTES # BLD AUTO: 0.37 K/UL
LYMPHOCYTES NFR BLD AUTO: 14.6 %
MAGNESIUM SERPL-MCNC: 1.7 MG/DL
MAN DIFF?: NORMAL
MCHC RBC-ENTMCNC: 26 PG
MCHC RBC-ENTMCNC: 30.6 G/DL
MCV RBC AUTO: 84.8 FL
MICROSCOPIC-UA: NORMAL
MONOCYTES # BLD AUTO: 0.14 K/UL
MONOCYTES NFR BLD AUTO: 5.5 %
NEUTROPHILS # BLD AUTO: 1.85 K/UL
NEUTROPHILS NFR BLD AUTO: 73.6 %
NITRITE URINE: NEGATIVE
PH URINE: 6
PHOSPHATE SERPL-MCNC: 3.3 MG/DL
PLATELET # BLD AUTO: 232 K/UL
POTASSIUM SERPL-SCNC: 4.1 MMOL/L
PROT SERPL-MCNC: 6.9 G/DL
PROT UR-MCNC: 83 MG/DL
PROTEIN URINE: 100 MG/DL
RBC # BLD: 4.2 M/UL
RBC # FLD: 15.9 %
RED BLOOD CELLS URINE: 13 /HPF
SODIUM SERPL-SCNC: 143 MMOL/L
SPECIFIC GRAVITY URINE: 1.02
TACROLIMUS SERPL-MCNC: 4.2 NG/ML
UROBILINOGEN URINE: 0.2 MG/DL
WBC # FLD AUTO: 2.52 K/UL
WHITE BLOOD CELLS URINE: 2 /HPF

## 2025-03-10 LAB
BKV DNA SPEC QL NAA+PROBE: ABNORMAL IU/ML
CMV DNA SPEC QL NAA+PROBE: NOT DETECTED IU/ML
CMVPCR LOG: NOT DETECTED LOG10IU/ML

## 2025-03-13 RX ORDER — TACROLIMUS 1 MG/1
1 TABLET, EXTENDED RELEASE ORAL
Qty: 30 | Refills: 11 | Status: ACTIVE | COMMUNITY
Start: 2025-03-06 | End: 1900-01-01

## 2025-03-20 DIAGNOSIS — Z94.0 KIDNEY TRANSPLANT STATUS: ICD-10-CM

## 2025-03-24 ENCOUNTER — APPOINTMENT (OUTPATIENT)
Dept: TRANSPLANT | Facility: CLINIC | Age: 35
End: 2025-03-24

## 2025-03-24 ENCOUNTER — LABORATORY RESULT (OUTPATIENT)
Age: 35
End: 2025-03-24

## 2025-03-27 ENCOUNTER — NON-APPOINTMENT (OUTPATIENT)
Age: 35
End: 2025-03-27

## 2025-03-27 LAB
ALBUMIN SERPL ELPH-MCNC: 4.1 G/DL
ALP BLD-CCNC: 57 U/L
ALT SERPL-CCNC: 12 U/L
ANION GAP SERPL CALC-SCNC: 9 MMOL/L
APPEARANCE: ABNORMAL
AST SERPL-CCNC: 12 U/L
BACTERIA: ABNORMAL /HPF
BASOPHILS # BLD AUTO: 0.01 K/UL
BASOPHILS NFR BLD AUTO: 0.4 %
BILIRUB SERPL-MCNC: 0.2 MG/DL
BILIRUBIN URINE: NEGATIVE
BKV DNA SPEC QL NAA+PROBE: 700 IU/ML
BLOOD URINE: ABNORMAL
BUN SERPL-MCNC: 18 MG/DL
CALCIUM SERPL-MCNC: 9 MG/DL
CAST: 1 /LPF
CHLORIDE SERPL-SCNC: 106 MMOL/L
CO2 SERPL-SCNC: 27 MMOL/L
COLOR: YELLOW
CREAT SERPL-MCNC: 1.33 MG/DL
CREAT SPEC-SCNC: 238 MG/DL
CREAT/PROT UR: 0.9 RATIO
EGFRCR SERPLBLD CKD-EPI 2021: 55 ML/MIN/1.73M2
EOSINOPHIL # BLD AUTO: 0.09 K/UL
EOSINOPHIL NFR BLD AUTO: 3.8 %
EPITHELIAL CELLS: 22 /HPF
GLUCOSE QUALITATIVE U: NEGATIVE MG/DL
GLUCOSE SERPL-MCNC: 96 MG/DL
HCT VFR BLD CALC: 38.1 %
HGB BLD-MCNC: 11.7 G/DL
IMM GRANULOCYTES NFR BLD AUTO: 1.3 %
KETONES URINE: NEGATIVE MG/DL
LEUKOCYTE ESTERASE URINE: NEGATIVE
LYMPHOCYTES # BLD AUTO: 0.39 K/UL
LYMPHOCYTES NFR BLD AUTO: 16.5 %
MAGNESIUM SERPL-MCNC: 1.9 MG/DL
MAN DIFF?: NORMAL
MCHC RBC-ENTMCNC: 25.7 PG
MCHC RBC-ENTMCNC: 30.7 G/DL
MCV RBC AUTO: 83.7 FL
MICROSCOPIC-UA: NORMAL
MONOCYTES # BLD AUTO: 0.15 K/UL
MONOCYTES NFR BLD AUTO: 6.4 %
NEUTROPHILS # BLD AUTO: 1.69 K/UL
NEUTROPHILS NFR BLD AUTO: 71.6 %
NITRITE URINE: NEGATIVE
PH URINE: 6.5
PHOSPHATE SERPL-MCNC: 2.7 MG/DL
PLATELET # BLD AUTO: 210 K/UL
POTASSIUM SERPL-SCNC: 4.7 MMOL/L
PROT SERPL-MCNC: 6.4 G/DL
PROT UR-MCNC: 212 MG/DL
PROTEIN URINE: 300 MG/DL
RBC # BLD: 4.55 M/UL
RBC # FLD: 15.7 %
RED BLOOD CELLS URINE: 10 /HPF
SODIUM SERPL-SCNC: 142 MMOL/L
SPECIFIC GRAVITY URINE: 1.03
TACROLIMUS SERPL-MCNC: 8.7 NG/ML
UROBILINOGEN URINE: 0.2 MG/DL
WBC # FLD AUTO: 2.36 K/UL
WHITE BLOOD CELLS URINE: 6 /HPF

## 2025-05-13 DIAGNOSIS — Z94.0 KIDNEY TRANSPLANT STATUS: ICD-10-CM

## 2025-05-14 ENCOUNTER — APPOINTMENT (OUTPATIENT)
Dept: NEPHROLOGY | Facility: CLINIC | Age: 35
End: 2025-05-14
Payer: COMMERCIAL

## 2025-05-14 VITALS
HEIGHT: 68 IN | DIASTOLIC BLOOD PRESSURE: 86 MMHG | WEIGHT: 260 LBS | SYSTOLIC BLOOD PRESSURE: 139 MMHG | BODY MASS INDEX: 39.4 KG/M2 | RESPIRATION RATE: 18 BRPM | TEMPERATURE: 97.7 F | OXYGEN SATURATION: 99 % | HEART RATE: 80 BPM

## 2025-05-14 PROCEDURE — 99215 OFFICE O/P EST HI 40 MIN: CPT

## 2025-05-14 RX ORDER — TACROLIMUS 1 MG/1
1 TABLET, EXTENDED RELEASE ORAL
Qty: 30 | Refills: 11 | Status: ACTIVE | COMMUNITY
Start: 2025-05-14

## 2025-05-15 ENCOUNTER — NON-APPOINTMENT (OUTPATIENT)
Age: 35
End: 2025-05-15

## 2025-05-15 LAB
25(OH)D3 SERPL-MCNC: 34.7 NG/ML
ALBUMIN SERPL ELPH-MCNC: 4.3 G/DL
ALP BLD-CCNC: 64 U/L
ALT SERPL-CCNC: 10 U/L
ANION GAP SERPL CALC-SCNC: 15 MMOL/L
APPEARANCE: CLEAR
AST SERPL-CCNC: 10 U/L
BACTERIA: ABNORMAL /HPF
BASOPHILS # BLD AUTO: 0.03 K/UL
BASOPHILS NFR BLD AUTO: 0.4 %
BILIRUB SERPL-MCNC: 0.2 MG/DL
BILIRUBIN URINE: NEGATIVE
BLOOD URINE: ABNORMAL
BUN SERPL-MCNC: 20 MG/DL
CALCIUM SERPL-MCNC: 9.8 MG/DL
CALCIUM SERPL-MCNC: 9.8 MG/DL
CAST: 1 /LPF
CHLORIDE SERPL-SCNC: 100 MMOL/L
CHOLEST SERPL-MCNC: 186 MG/DL
CMV DNA SPEC QL NAA+PROBE: NOT DETECTED IU/ML
CMVPCR LOG: NOT DETECTED LOG10IU/ML
CO2 SERPL-SCNC: 24 MMOL/L
COLOR: YELLOW
CREAT SERPL-MCNC: 1.38 MG/DL
CREAT SPEC-SCNC: 158 MG/DL
CREAT/PROT UR: 0.5 RATIO
EGFRCR SERPLBLD CKD-EPI 2021: 52 ML/MIN/1.73M2
EOSINOPHIL # BLD AUTO: 0.09 K/UL
EOSINOPHIL NFR BLD AUTO: 1.3 %
EPITHELIAL CELLS: 7 /HPF
ESTIMATED AVERAGE GLUCOSE: 117 MG/DL
GLUCOSE QUALITATIVE U: NEGATIVE MG/DL
GLUCOSE SERPL-MCNC: 91 MG/DL
HBA1C MFR BLD HPLC: 5.7 %
HCT VFR BLD CALC: 38.7 %
HDLC SERPL-MCNC: 75 MG/DL
HGB BLD-MCNC: 12.4 G/DL
IMM GRANULOCYTES NFR BLD AUTO: 1.2 %
KETONES URINE: NEGATIVE MG/DL
LDH SERPL-CCNC: 191 U/L
LDLC SERPL-MCNC: 92 MG/DL
LEUKOCYTE ESTERASE URINE: NEGATIVE
LYMPHOCYTES # BLD AUTO: 0.63 K/UL
LYMPHOCYTES NFR BLD AUTO: 9.4 %
MAGNESIUM SERPL-MCNC: 1.7 MG/DL
MAN DIFF?: NORMAL
MCHC RBC-ENTMCNC: 27.6 PG
MCHC RBC-ENTMCNC: 32 G/DL
MCV RBC AUTO: 86.2 FL
MICROSCOPIC-UA: NORMAL
MONOCYTES # BLD AUTO: 0.34 K/UL
MONOCYTES NFR BLD AUTO: 5.1 %
NEUTROPHILS # BLD AUTO: 5.52 K/UL
NEUTROPHILS NFR BLD AUTO: 82.6 %
NITRITE URINE: NEGATIVE
NONHDLC SERPL-MCNC: 111 MG/DL
PARATHYROID HORMONE INTACT: 74 PG/ML
PH URINE: 6
PHOSPHATE SERPL-MCNC: 3.3 MG/DL
PLATELET # BLD AUTO: 294 K/UL
POTASSIUM SERPL-SCNC: 4.3 MMOL/L
PROT SERPL-MCNC: 6.9 G/DL
PROT UR-MCNC: 76 MG/DL
PROTEIN URINE: 100 MG/DL
RBC # BLD: 4.49 M/UL
RBC # FLD: 14.6 %
RED BLOOD CELLS URINE: 4 /HPF
SODIUM SERPL-SCNC: 139 MMOL/L
SPECIFIC GRAVITY URINE: 1.02
TACROLIMUS SERPL-MCNC: 8.6 NG/ML
TRIGL SERPL-MCNC: 114 MG/DL
URATE SERPL-MCNC: 4 MG/DL
UROBILINOGEN URINE: 0.2 MG/DL
WBC # FLD AUTO: 6.69 K/UL
WHITE BLOOD CELLS URINE: 1 /HPF

## 2025-05-19 LAB — BKV DNA SPEC QL NAA+PROBE: 253 IU/ML

## 2025-06-19 ENCOUNTER — APPOINTMENT (OUTPATIENT)
Dept: DERMATOLOGY | Facility: CLINIC | Age: 35
End: 2025-06-19
Payer: COMMERCIAL

## 2025-06-19 VITALS — WEIGHT: 260 LBS | BODY MASS INDEX: 39.53 KG/M2

## 2025-06-19 PROBLEM — D22.9 MULTIPLE NEVI: Status: ACTIVE | Noted: 2025-06-19

## 2025-06-19 PROBLEM — Z12.83 SCREENING EXAM FOR SKIN CANCER: Status: ACTIVE | Noted: 2025-06-19

## 2025-06-19 PROBLEM — L82.1 DERMATOSIS PAPULOSA NIGRA: Status: ACTIVE | Noted: 2025-06-19

## 2025-06-19 PROCEDURE — 99203 OFFICE O/P NEW LOW 30 MIN: CPT

## 2025-07-30 ENCOUNTER — APPOINTMENT (OUTPATIENT)
Dept: OBGYN | Facility: CLINIC | Age: 35
End: 2025-07-30
Payer: COMMERCIAL

## 2025-07-30 VITALS
DIASTOLIC BLOOD PRESSURE: 92 MMHG | HEIGHT: 68 IN | BODY MASS INDEX: 42.59 KG/M2 | WEIGHT: 281 LBS | HEART RATE: 91 BPM | SYSTOLIC BLOOD PRESSURE: 136 MMHG

## 2025-07-30 VITALS — SYSTOLIC BLOOD PRESSURE: 121 MMHG | DIASTOLIC BLOOD PRESSURE: 82 MMHG

## 2025-07-30 PROCEDURE — 99203 OFFICE O/P NEW LOW 30 MIN: CPT

## 2025-08-03 LAB
HCT VFR BLD CALC: 37.5 %
HGB BLD-MCNC: 11.9 G/DL
MCHC RBC-ENTMCNC: 27.9 PG
MCHC RBC-ENTMCNC: 31.7 G/DL
MCV RBC AUTO: 87.8 FL
PLATELET # BLD AUTO: 300 K/UL
RBC # BLD: 4.27 M/UL
RBC # FLD: 13.2 %
T4 FREE SERPL-MCNC: 1.2 NG/DL
TESTOST FREE SERPL-MCNC: 0.5 PG/ML
TESTOST SERPL-MCNC: 40.6 NG/DL
TSH SERPL-ACNC: 0.74 UIU/ML
WBC # FLD AUTO: 11.27 K/UL

## 2025-08-05 DIAGNOSIS — Z94.0 KIDNEY TRANSPLANT STATUS: ICD-10-CM

## 2025-08-06 ENCOUNTER — APPOINTMENT (OUTPATIENT)
Dept: NEPHROLOGY | Facility: CLINIC | Age: 35
End: 2025-08-06
Payer: COMMERCIAL

## 2025-08-06 ENCOUNTER — NON-APPOINTMENT (OUTPATIENT)
Age: 35
End: 2025-08-06

## 2025-08-06 VITALS
WEIGHT: 280 LBS | HEART RATE: 75 BPM | SYSTOLIC BLOOD PRESSURE: 137 MMHG | BODY MASS INDEX: 42.44 KG/M2 | DIASTOLIC BLOOD PRESSURE: 98 MMHG | HEIGHT: 68 IN | OXYGEN SATURATION: 99 % | TEMPERATURE: 97.8 F

## 2025-08-06 DIAGNOSIS — L02.92 FURUNCLE, UNSPECIFIED: ICD-10-CM

## 2025-08-06 LAB
25(OH)D3 SERPL-MCNC: 34.3 NG/ML
ALBUMIN SERPL ELPH-MCNC: 4.2 G/DL
ALP BLD-CCNC: 76 U/L
ALT SERPL-CCNC: 10 U/L
ANION GAP SERPL CALC-SCNC: 13 MMOL/L
APPEARANCE: CLEAR
AST SERPL-CCNC: 15 U/L
BACTERIA: NEGATIVE /HPF
BASOPHILS # BLD AUTO: 0.03 K/UL
BASOPHILS NFR BLD AUTO: 0.3 %
BILIRUB SERPL-MCNC: 0.3 MG/DL
BILIRUBIN URINE: NEGATIVE
BLOOD URINE: ABNORMAL
BUN SERPL-MCNC: 17 MG/DL
CALCIUM SERPL-MCNC: 9.1 MG/DL
CALCIUM SERPL-MCNC: 9.1 MG/DL
CAST: 0 /LPF
CHLORIDE SERPL-SCNC: 104 MMOL/L
CHOLEST SERPL-MCNC: 151 MG/DL
CO2 SERPL-SCNC: 22 MMOL/L
COLOR: YELLOW
CREAT SERPL-MCNC: 1.18 MG/DL
CREAT SPEC-SCNC: 121 MG/DL
CREAT/PROT UR: 0.3 RATIO
EGFRCR SERPLBLD CKD-EPI 2021: 63 ML/MIN/1.73M2
EOSINOPHIL # BLD AUTO: 0.1 K/UL
EOSINOPHIL NFR BLD AUTO: 1 %
EPITHELIAL CELLS: 0 /HPF
ESTIMATED AVERAGE GLUCOSE: 134 MG/DL
GLUCOSE QUALITATIVE U: NEGATIVE MG/DL
GLUCOSE SERPL-MCNC: 120 MG/DL
HBA1C MFR BLD HPLC: 6.3 %
HCT VFR BLD CALC: 35.8 %
HDLC SERPL-MCNC: 68 MG/DL
HGB BLD-MCNC: 11.8 G/DL
IMM GRANULOCYTES NFR BLD AUTO: 1.4 %
KETONES URINE: NEGATIVE MG/DL
LDH SERPL-CCNC: 215 U/L
LDLC SERPL-MCNC: 68 MG/DL
LEUKOCYTE ESTERASE URINE: NEGATIVE
LYMPHOCYTES # BLD AUTO: 0.57 K/UL
LYMPHOCYTES NFR BLD AUTO: 5.7 %
MAGNESIUM SERPL-MCNC: 1.5 MG/DL
MAN DIFF?: NORMAL
MCHC RBC-ENTMCNC: 28.2 PG
MCHC RBC-ENTMCNC: 33 G/DL
MCV RBC AUTO: 85.4 FL
MICROSCOPIC-UA: NORMAL
MONOCYTES # BLD AUTO: 0.65 K/UL
MONOCYTES NFR BLD AUTO: 6.5 %
NEUTROPHILS # BLD AUTO: 8.55 K/UL
NEUTROPHILS NFR BLD AUTO: 85.1 %
NITRITE URINE: NEGATIVE
NONHDLC SERPL-MCNC: 83 MG/DL
PARATHYROID HORMONE INTACT: 89 PG/ML
PH URINE: 7
PHOSPHATE SERPL-MCNC: 2.8 MG/DL
PLATELET # BLD AUTO: 269 K/UL
POTASSIUM SERPL-SCNC: 3.8 MMOL/L
PROT SERPL-MCNC: 6.6 G/DL
PROT UR-MCNC: 30 MG/DL
PROTEIN URINE: 30 MG/DL
RBC # BLD: 4.19 M/UL
RBC # FLD: 13.4 %
RED BLOOD CELLS URINE: 47 /HPF
SODIUM SERPL-SCNC: 139 MMOL/L
SPECIFIC GRAVITY URINE: 1.02
TACROLIMUS SERPL-MCNC: 10.6 NG/ML
TRIGL SERPL-MCNC: 78 MG/DL
URATE SERPL-MCNC: 4.6 MG/DL
UROBILINOGEN URINE: 0.2 MG/DL
WBC # FLD AUTO: 10.04 K/UL
WHITE BLOOD CELLS URINE: 1 /HPF

## 2025-08-06 PROCEDURE — 99215 OFFICE O/P EST HI 40 MIN: CPT

## 2025-08-06 RX ORDER — OLMESARTAN MEDOXOMIL 20 MG/1
20 TABLET, FILM COATED ORAL DAILY
Qty: 90 | Refills: 3 | Status: ACTIVE | COMMUNITY
Start: 2025-08-06 | End: 1900-01-01

## 2025-08-06 RX ORDER — CLINDAMYCIN HYDROCHLORIDE 300 MG/1
300 CAPSULE ORAL 4 TIMES DAILY
Qty: 28 | Refills: 0 | Status: ACTIVE | COMMUNITY
Start: 2025-08-06 | End: 1900-01-01

## 2025-08-06 RX ORDER — TACROLIMUS 1 MG/1
1 TABLET, EXTENDED RELEASE ORAL
Qty: 90 | Refills: 11 | Status: ACTIVE | COMMUNITY
Start: 2025-08-06

## 2025-08-07 LAB
CMV DNA SPEC QL NAA+PROBE: NOT DETECTED IU/ML
CMVPCR LOG: NOT DETECTED LOG10IU/ML

## 2025-08-08 ENCOUNTER — APPOINTMENT (OUTPATIENT)
Dept: OBGYN | Facility: CLINIC | Age: 35
End: 2025-08-08
Payer: COMMERCIAL

## 2025-08-08 LAB — DHEA-SULFATE, SERUM: 14 UG/DL

## 2025-08-08 PROCEDURE — 76830 TRANSVAGINAL US NON-OB: CPT

## 2025-08-11 LAB — BKV DNA SPEC QL NAA+PROBE: 87 IU/ML

## 2025-08-13 ENCOUNTER — APPOINTMENT (OUTPATIENT)
Dept: OBGYN | Facility: CLINIC | Age: 35
End: 2025-08-13
Payer: COMMERCIAL

## 2025-08-13 ENCOUNTER — NON-APPOINTMENT (OUTPATIENT)
Age: 35
End: 2025-08-13

## 2025-08-13 VITALS
DIASTOLIC BLOOD PRESSURE: 73 MMHG | HEIGHT: 68 IN | HEART RATE: 88 BPM | BODY MASS INDEX: 43.04 KG/M2 | SYSTOLIC BLOOD PRESSURE: 115 MMHG | WEIGHT: 284 LBS

## 2025-08-13 DIAGNOSIS — N92.1 EXCESSIVE AND FREQUENT MENSTRUATION WITH IRREGULAR CYCLE: ICD-10-CM

## 2025-08-13 PROCEDURE — 99395 PREV VISIT EST AGE 18-39: CPT

## 2025-08-13 PROCEDURE — 99215 OFFICE O/P EST HI 40 MIN: CPT | Mod: 25

## 2025-08-13 RX ORDER — TRANEXAMIC ACID 650 MG/1
650 TABLET ORAL
Qty: 30 | Refills: 1 | Status: ACTIVE | COMMUNITY
Start: 2025-08-13 | End: 1900-01-01

## 2025-08-15 ENCOUNTER — NON-APPOINTMENT (OUTPATIENT)
Age: 35
End: 2025-08-15

## 2025-08-16 PROBLEM — N92.1 MENORRHAGIA WITH IRREGULAR CYCLE: Status: ACTIVE | Noted: 2025-07-30

## 2025-08-20 ENCOUNTER — APPOINTMENT (OUTPATIENT)
Dept: OBGYN | Facility: CLINIC | Age: 35
End: 2025-08-20
Payer: COMMERCIAL

## 2025-08-20 VITALS
HEIGHT: 68 IN | WEIGHT: 285 LBS | DIASTOLIC BLOOD PRESSURE: 74 MMHG | BODY MASS INDEX: 43.19 KG/M2 | SYSTOLIC BLOOD PRESSURE: 110 MMHG | HEART RATE: 79 BPM

## 2025-08-20 DIAGNOSIS — N93.9 ABNORMAL UTERINE AND VAGINAL BLEEDING, UNSPECIFIED: ICD-10-CM

## 2025-08-20 LAB
HCG UR QL: NEGATIVE
QUALITY CONTROL: YES

## 2025-08-20 PROCEDURE — 99213 OFFICE O/P EST LOW 20 MIN: CPT | Mod: 25

## 2025-08-20 PROCEDURE — 99459 PELVIC EXAMINATION: CPT

## 2025-08-20 PROCEDURE — 58100 BIOPSY OF UTERUS LINING: CPT

## 2025-08-21 DIAGNOSIS — Z94.0 KIDNEY TRANSPLANT STATUS: ICD-10-CM

## 2025-08-22 ENCOUNTER — APPOINTMENT (OUTPATIENT)
Dept: TRANSPLANT | Facility: CLINIC | Age: 35
End: 2025-08-22

## 2025-08-26 LAB — CORE LAB BIOPSY: NORMAL

## 2025-09-02 RX ORDER — MEDROXYPROGESTERONE ACETATE 10 MG/1
10 TABLET ORAL
Qty: 10 | Refills: 1 | Status: ACTIVE | COMMUNITY
Start: 2025-09-02 | End: 1900-01-01

## 2025-09-08 DIAGNOSIS — Z94.0 KIDNEY TRANSPLANT STATUS: ICD-10-CM

## (undated) DEVICE — DRAPE GENERAL ENDOSCOPY

## (undated) DEVICE — WARMING BLANKET UPPER ADULT

## (undated) DEVICE — DRAPE TOWEL BLUE 17" X 24"

## (undated) DEVICE — PACK BASIC GOWN

## (undated) DEVICE — SYR LUER LOK 50CC

## (undated) DEVICE — DRAPE 1/2 SHEET 40X57"

## (undated) DEVICE — SUT SOFSILK 2-0 30" TIES

## (undated) DEVICE — SPECIMEN CONTAINER 100ML

## (undated) DEVICE — SUT SOFSILK 4-0 18" V-20

## (undated) DEVICE — VENODYNE/SCD SLEEVE CALF MEDIUM

## (undated) DEVICE — DRSG SURG OPSITE 10X4"

## (undated) DEVICE — BAG DECANTER DISP

## (undated) DEVICE — GLV 8.5 PROTEXIS (WHITE)

## (undated) DEVICE — SOL IRR POUR H2O 250ML

## (undated) DEVICE — GLV 8 PROTEXIS (WHITE)

## (undated) DEVICE — FOLEY TRAY 16FR LF URINE METER SURESTEP

## (undated) DEVICE — SOL IRR POUR NS 0.9% 500ML

## (undated) DEVICE — SUT POLYSORB 3-0 30" V-20 UNDYED

## (undated) DEVICE — STOPCOCK 4-WAY W SWIVEL MALE LUER LOCK NON VENTED RED CAP

## (undated) DEVICE — DRAIN PENROSE .25" X 18" LATEX

## (undated) DEVICE — ELCTR BOVIE TIP BLADE INSULATED 6.5" EDGE

## (undated) DEVICE — STAPLER SKIN VISI-STAT 35 WIDE

## (undated) DEVICE — VESSEL LOOP MAXI-RED  0.120" X 16"

## (undated) DEVICE — DRAPE ISOLATION BAG 20X20"

## (undated) DEVICE — TUBING TRUWAVE PRESSURE MALE/FEMALE 72"

## (undated) DEVICE — GOWN XL EXTRA LONG

## (undated) DEVICE — SUT SOFSILK 2-0 18" TIES

## (undated) DEVICE — DRAIN RESERVOIR FOR JACKSON PRATT 100CC CARDINAL

## (undated) DEVICE — DRAPE IOBAN 23" X 23"

## (undated) DEVICE — SUT SURGIPRO 1 60" GS-26

## (undated) DEVICE — SUT SOFSILK 4-0 30" TIES

## (undated) DEVICE — ELCTR BOVIE PENCIL SMOKE EVACUATION

## (undated) DEVICE — NDL COUNTER FOAM AND MAGNET 40-70

## (undated) DEVICE — PACK BASIN SPECIAL PROCEDURE

## (undated) DEVICE — DRAPE 3/4 SHEET W REINFORCEMENT 56X77"

## (undated) DEVICE — AORTIC PUNCH 4.8 MM LONG HANDLE

## (undated) DEVICE — SYR LUER LOK 20CC

## (undated) DEVICE — DRAPE SLUSH / WARMER 44 X 66"

## (undated) DEVICE — PREP CHLORAPREP HI-LITE ORANGE 26ML

## (undated) DEVICE — SUT SOFSILK 2-0 18" V-20 (POP-OFF)

## (undated) DEVICE — SUT PROLENE 6-0 30" C-1

## (undated) DEVICE — Device

## (undated) DEVICE — SUT SOFSILK 4-0 18" TIES

## (undated) DEVICE — PACK MAJOR ABDOMINAL SUPINE

## (undated) DEVICE — ELCTR BOVIE TIP BLADE INSULATED 2.75" EDGE

## (undated) DEVICE — SUT PDS II PLUS 4-0 27" SH

## (undated) DEVICE — WARMING BLANKET LOWER ADULT

## (undated) DEVICE — SUT PDS II PLUS 5-0 30" RB-1

## (undated) DEVICE — SUT BOOT STANDARD (ASSORTED) 5 PAIR

## (undated) DEVICE — CATH IV SAFE BC 20G X 1.16" (PINK)